# Patient Record
Sex: FEMALE | Race: BLACK OR AFRICAN AMERICAN | NOT HISPANIC OR LATINO | Employment: OTHER | URBAN - METROPOLITAN AREA
[De-identification: names, ages, dates, MRNs, and addresses within clinical notes are randomized per-mention and may not be internally consistent; named-entity substitution may affect disease eponyms.]

---

## 2018-10-23 ENCOUNTER — OFFICE VISIT (OUTPATIENT)
Dept: GASTROENTEROLOGY | Facility: CLINIC | Age: 68
End: 2018-10-23
Payer: COMMERCIAL

## 2018-10-23 ENCOUNTER — APPOINTMENT (OUTPATIENT)
Dept: LAB | Facility: CLINIC | Age: 68
End: 2018-10-23
Payer: COMMERCIAL

## 2018-10-23 VITALS
BODY MASS INDEX: 20.38 KG/M2 | HEIGHT: 63 IN | DIASTOLIC BLOOD PRESSURE: 70 MMHG | WEIGHT: 115 LBS | SYSTOLIC BLOOD PRESSURE: 130 MMHG | HEART RATE: 76 BPM

## 2018-10-23 DIAGNOSIS — Z93.2 ILEOSTOMY IN PLACE (HCC): ICD-10-CM

## 2018-10-23 DIAGNOSIS — Z93.2 ILEOSTOMY IN PLACE (HCC): Primary | ICD-10-CM

## 2018-10-23 DIAGNOSIS — K50.012 CROHN'S DISEASE OF SMALL INTESTINE WITH INTESTINAL OBSTRUCTION (HCC): ICD-10-CM

## 2018-10-23 LAB
ALBUMIN SERPL BCP-MCNC: 3.4 G/DL (ref 3.5–5)
ALP SERPL-CCNC: 78 U/L (ref 46–116)
ALT SERPL W P-5'-P-CCNC: 18 U/L (ref 12–78)
ANION GAP SERPL CALCULATED.3IONS-SCNC: 5 MMOL/L (ref 4–13)
AST SERPL W P-5'-P-CCNC: 12 U/L (ref 5–45)
BASOPHILS # BLD AUTO: 0.02 THOUSANDS/ΜL (ref 0–0.1)
BASOPHILS NFR BLD AUTO: 0 % (ref 0–1)
BILIRUB SERPL-MCNC: 0.4 MG/DL (ref 0.2–1)
BUN SERPL-MCNC: 16 MG/DL (ref 5–25)
CALCIUM SERPL-MCNC: 9.4 MG/DL (ref 8.3–10.1)
CHLORIDE SERPL-SCNC: 106 MMOL/L (ref 100–108)
CO2 SERPL-SCNC: 26 MMOL/L (ref 21–32)
CREAT SERPL-MCNC: 0.76 MG/DL (ref 0.6–1.3)
CRP SERPL QL: <3 MG/L
EOSINOPHIL # BLD AUTO: 0.04 THOUSAND/ΜL (ref 0–0.61)
EOSINOPHIL NFR BLD AUTO: 1 % (ref 0–6)
ERYTHROCYTE [DISTWIDTH] IN BLOOD BY AUTOMATED COUNT: 15.9 % (ref 11.6–15.1)
GFR SERPL CREATININE-BSD FRML MDRD: 95 ML/MIN/1.73SQ M
GLUCOSE SERPL-MCNC: 86 MG/DL (ref 65–140)
HCT VFR BLD AUTO: 37.5 % (ref 34.8–46.1)
HGB BLD-MCNC: 11.5 G/DL (ref 11.5–15.4)
IMM GRANULOCYTES # BLD AUTO: 0 THOUSAND/UL (ref 0–0.2)
IMM GRANULOCYTES NFR BLD AUTO: 0 % (ref 0–2)
LYMPHOCYTES # BLD AUTO: 2.26 THOUSANDS/ΜL (ref 0.6–4.47)
LYMPHOCYTES NFR BLD AUTO: 49 % (ref 14–44)
MCH RBC QN AUTO: 26.1 PG (ref 26.8–34.3)
MCHC RBC AUTO-ENTMCNC: 30.7 G/DL (ref 31.4–37.4)
MCV RBC AUTO: 85 FL (ref 82–98)
MONOCYTES # BLD AUTO: 0.56 THOUSAND/ΜL (ref 0.17–1.22)
MONOCYTES NFR BLD AUTO: 12 % (ref 4–12)
NEUTROPHILS # BLD AUTO: 1.76 THOUSANDS/ΜL (ref 1.85–7.62)
NEUTS SEG NFR BLD AUTO: 38 % (ref 43–75)
NRBC BLD AUTO-RTO: 0 /100 WBCS
PLATELET # BLD AUTO: 376 THOUSANDS/UL (ref 149–390)
PMV BLD AUTO: 11.6 FL (ref 8.9–12.7)
POTASSIUM SERPL-SCNC: 3.6 MMOL/L (ref 3.5–5.3)
PROT SERPL-MCNC: 8.1 G/DL (ref 6.4–8.2)
RBC # BLD AUTO: 4.4 MILLION/UL (ref 3.81–5.12)
SODIUM SERPL-SCNC: 137 MMOL/L (ref 136–145)
WBC # BLD AUTO: 4.64 THOUSAND/UL (ref 4.31–10.16)

## 2018-10-23 PROCEDURE — 36415 COLL VENOUS BLD VENIPUNCTURE: CPT

## 2018-10-23 PROCEDURE — 85025 COMPLETE CBC W/AUTO DIFF WBC: CPT

## 2018-10-23 PROCEDURE — 86140 C-REACTIVE PROTEIN: CPT

## 2018-10-23 PROCEDURE — 80053 COMPREHEN METABOLIC PANEL: CPT

## 2018-10-23 PROCEDURE — 99204 OFFICE O/P NEW MOD 45 MIN: CPT | Performed by: INTERNAL MEDICINE

## 2018-10-23 RX ORDER — ALBUTEROL SULFATE 90 UG/1
AEROSOL, METERED RESPIRATORY (INHALATION)
COMMUNITY
Start: 2018-06-21

## 2018-10-23 RX ORDER — DORZOLAMIDE HYDROCHLORIDE AND TIMOLOL MALEATE 20; 5 MG/ML; MG/ML
SOLUTION/ DROPS OPHTHALMIC
COMMUNITY
Start: 2018-01-24

## 2018-10-23 RX ORDER — PREDNISONE 10 MG/1
TABLET ORAL
COMMUNITY
Start: 2018-08-28 | End: 2020-08-12 | Stop reason: ALTCHOICE

## 2018-10-23 RX ORDER — FAMOTIDINE 20 MG
TABLET ORAL
COMMUNITY

## 2018-10-23 RX ORDER — FOLIC ACID 20 MG
CAPSULE ORAL
COMMUNITY

## 2018-10-23 RX ORDER — MONTELUKAST SODIUM 10 MG/1
TABLET ORAL
COMMUNITY
Start: 2018-08-28 | End: 2021-10-13 | Stop reason: SDDI

## 2018-10-23 RX ORDER — MOMETASONE FUROATE 50 UG/1
2 SPRAY, METERED NASAL
COMMUNITY
Start: 2018-08-28

## 2018-10-23 RX ORDER — BRIMONIDINE TARTRATE 2 MG/ML
SOLUTION/ DROPS OPHTHALMIC
COMMUNITY
Start: 2018-10-10 | End: 2020-08-12 | Stop reason: DRUGHIGH

## 2018-10-23 RX ORDER — LEVOTHYROXINE SODIUM 0.03 MG/1
TABLET ORAL
COMMUNITY
Start: 2018-08-28

## 2018-10-23 RX ORDER — POTASSIUM CHLORIDE 20 MEQ/1
20 TABLET, EXTENDED RELEASE ORAL
COMMUNITY
Start: 2015-07-15

## 2018-10-23 RX ORDER — ASCORBIC ACID 125 MG
TABLET,CHEWABLE ORAL
COMMUNITY

## 2018-10-23 RX ORDER — LOPERAMIDE HYDROCHLORIDE 2 MG/1
TABLET ORAL
COMMUNITY
Start: 2018-10-08 | End: 2018-11-07

## 2018-10-23 RX ORDER — PANTOPRAZOLE SODIUM 40 MG/1
40 TABLET, DELAYED RELEASE ORAL
COMMUNITY
Start: 2015-07-15 | End: 2020-08-12 | Stop reason: SDUPTHER

## 2018-10-23 NOTE — LETTER
October 23, 2018     Chuck Christianson MD  [facility] [address] [facility]  [address]    Patient: Ayanna Hui   YOB: 1950   Date of Visit: 10/23/2018       Dear Dr Macrina Siddiqui: Thank you for referring Ayanna Hui to me for evaluation  Below are my notes for this consultation  If you have questions, please do not hesitate to call me  I look forward to following your patient along with you  Sincerely,        Frederick Castro MD        CC: No Recipients  Trey December, Ruby  10/23/2018  3:21 PM  Sign at close encounter  [facility]    Dear Dr Macrina Siddiqui,    I had the pleasure of seeing your patient Ayanna Hui in the office today and I thank you for this kind referral        Chief Complaint: post-ileostomy      HPI:  Ayanna Hui is a 74 y o  female with history of COPD and diabetes who presents today post-ileostomy  She had j-pouch from prior surgery for ulcerative colitis  She recently had CT scan at [facility] which reportedly showed an abnormality  She reports that she had ileostomy performed in September 2018  She reports some mucus discharge from the rectum, likely a rectal pouch  She had colonoscopy and what sounds like possible perforation during the procedure  She was reportedly told that she has Crohn's disease because there was inflammation, it is unclear if biopsies were taken to provide objective evidence  She states that she has only seen the surgeon and has never seen a gastroenterologist  She has never taken budesonide or Lialda or any Rowasa preparations, or Remicaide or any intravenous preparations  She only has taken sulfasalazine which caused skin burns when she would go into the sun so this was discontinued  Currently, she has symptoms of mild intermittent abdominal pain, diarrhea for which she successfully takes loperamide, weight gain  She denies any weight loss   She has recently taken Prednisone but this was for an upper respiratory infection, not for bowel inflammation  The patient is a somewhat confusing historian and is not 100% clear why she is here, thinks it may have something to do with being treated for Crohn's disease  On further questioning, at the present time, it is apparent that her symptomatology is very minimal                  Review of Systems:   Constitutional: No fever or chills, feels well, no tiredness, positive mild recent weight gain   HENT: No complaints of earache, no hearing loss, no nosebleeds, no nasal discharge, no sore throat, no hoarseness  Eyes: No complaints of eye pain, no red eyes, no discharge from eyes, no itchy eyes  Cardiovascular: No complaints of slow heart rate, no fast heart rate, no chest pain, no palpitations, no leg claudication, no lower extremity edema  Respiratory: No complaints of shortness of breath, no wheezing, no cough, no SOB on exertion, no orthopnea  Gastrointestinal: As noted in HPI  Genitourinary: No complaints of dysuria, no incontinence, no hesitancy, no nocturia  Musculoskeletal: No complaints of arthralgia, no myalgias, no joint swelling or stiffness, no limb pain or swelling  Neurological: No complaints of headache, no confusion, no convulsions, no numbness or tingling, no dizziness or fainting, no limb weakness, no difficulty walking  Skin: No complaints of skin rash or skin lesions, no itching, no skin wound, no dry skin  Hematological/Lymphatic: No complaints of swollen glands, does not bleed easy  Allergic/Immunologic: No immunocompromised state  Endocrine:  No complaints of polyuria, no polydipsia  Psychiatric/Behavioral: is not suicidal, no sleep disturbances, no anxiety or depression, no change in personality, no emotional problems  Historical Information   No past medical history on file  No past surgical history on file    Social History   History   Alcohol Use No     History   Drug Use No     History   Smoking Status    Never Smoker   Smokeless Tobacco    Never Used     No family history on file  Current Medications: has a current medication list which includes the following prescription(s): albuterol, brimonidine tartrate, b-12, dorzolamide-timolol, folic acid, levothyroxine, loperamide, magnesium, mometasone, mometasone-formoterol, montelukast, ocutabs-lutein, pantoprazole, potassium chloride, prednisone, and zinc        Vital Signs: /70   Pulse 76   Ht 5' 2 5" (1 588 m)   Wt 52 2 kg (115 lb)   BMI 20 70 kg/m²      Physical Exam:   Constitutional  General Appearance: No acute distress, well appearing and well nourished  Head  Normocephalic  Eyes  Conjunctivae and lids: No swelling, erythema, or discharge  Pupils and irises: Equal, round and reactive to light  Ears, Nose, Mouth, and Throat  External inspection of ears and nose: Normal  Nasal mucosa, septum and turbinates: Normal without edema or erythema/   Oropharynx: Normal with no erythema, edema, exudate or lesions  Neck  Normal range of motion  Neck supple  Cardiovascular  Auscultation of the heart: Normal rate and rhythm, normal S1 and S2 without murmurs  Examination of the extremities for edema and/or varicosities: Normal  Pulmonary/Chest  Respiratory effort: No increased work of breathing or signs of respiratory distress  Auscultation of lungs: Clear to auscultation, equal breath sounds bilaterally, no wheezes, rales, no rhonchi  Abdomen  Abdomen: Non-tender, no masses  Ileostomy in place with no issues  Abdominal scarring from prior surgeries  Liver and spleen: No hepatomegaly or splenomegaly  Musculoskeletal  Gait and station: normal   Digits and Nails: normal without clubbing or cyanosis  Inspection/palpation of joints, bones, and muscles: Normal  Neurological  No nystagmus or asterixis  Skin  Skin and subcutaneous tissue: Normal without rashes or lesions  Lymphatic  Palpation of the lymph nodes in neck: No lymphadenopathy     Psychiatric  Orientation to person, place and time: Normal   Mood and affect: Normal          Labs:   No results found for: ALBUMIN, ALT, AST, BUN, CALCIUM, CL, CHOL, CO2, CREATININE, GFRAA, GFRNONAA, GLU, HDL, HCT, HGB, HGBA1C, LDL, MG, PHOS, PLT, K, PSA, NA, TRIG, WBC      X-Rays & Procedures:   No orders to display         ______________________________________________________________________      Assessment & Plan:        1  Ileostomy in place  Patient has an extensive GI history which was not available to me until today  She currently has ileostomy in place and was reportedly told she has Crohn's disease after recent colonoscopy, unclear if biopsies were taken to provide objective evidence of this  Will order CRP, CBC, and CMP at this time to evaluate  2  Crohn's Disease  Patient was reportedly told she has Crohn's disease after recent colonoscopy, uncertain how diagnosis was made  Will order CRP, CBC, and CMP as above  I will be happy to inform you of her results and any further recommendations  I would like to thank you for allowing me to participate in her care  I will be seeing her again in a couple of weeks to try to put all of this together for her  With warmest regards,    Brinda Sheikh MD, [facility]         Attestation:   By signing my name below, Renea Evelin, attest that this documentation has been prepared under the direction and in the presence of Brinda Sheikh MD  Electronically Signed: Ruby Sanchez  10/23/18     I, Brinda Sheikh, personally performed the services described in this documentation  All medical record entries made by the liliaiblily were at my direction and in my presence  I have reviewed the chart and discharge instructions and agree that the record reflects my personal performance and is accurate and complete    Brinda Sheikh MD  10/23/18

## 2018-10-23 NOTE — PROGRESS NOTES
[facility]    Dear Dr Eloise Santamaria,    I had the pleasure of seeing your patient Golden Vicente in the office today and I thank you for this kind referral        Chief Complaint: post-ileostomy      HPI:  Golden Vicente is a 74 y o  female with history of COPD and diabetes who presents today post-ileostomy  She had j-pouch from prior surgery for ulcerative colitis  She recently had CT scan at [facility] which reportedly showed an abnormality  She reports that she had ileostomy performed in September 2018  She reports some mucus discharge from the rectum, likely a rectal pouch  She had colonoscopy and what sounds like possible perforation during the procedure  She was reportedly told that she has Crohn's disease because there was inflammation, it is unclear if biopsies were taken to provide objective evidence  She states that she has only seen the surgeon and has never seen a gastroenterologist  She has never taken budesonide or Lialda or any Rowasa preparations, or Remicaide or any intravenous preparations  She only has taken sulfasalazine which caused skin burns when she would go into the sun so this was discontinued  Currently, she has symptoms of mild intermittent abdominal pain, diarrhea for which she successfully takes loperamide, weight gain  She denies any weight loss  She has recently taken Prednisone but this was for an upper respiratory infection, not for bowel inflammation  The patient is a somewhat confusing historian and is not 100% clear why she is here, thinks it may have something to do with being treated for Crohn's disease  On further questioning, at the present time, it is apparent that her symptomatology is very minimal                  Review of Systems:   Constitutional: No fever or chills, feels well, no tiredness, positive mild recent weight gain   HENT: No complaints of earache, no hearing loss, no nosebleeds, no nasal discharge, no sore throat, no hoarseness      Eyes: No complaints of eye pain, no red eyes, no discharge from eyes, no itchy eyes  Cardiovascular: No complaints of slow heart rate, no fast heart rate, no chest pain, no palpitations, no leg claudication, no lower extremity edema  Respiratory: No complaints of shortness of breath, no wheezing, no cough, no SOB on exertion, no orthopnea  Gastrointestinal: As noted in HPI  Genitourinary: No complaints of dysuria, no incontinence, no hesitancy, no nocturia  Musculoskeletal: No complaints of arthralgia, no myalgias, no joint swelling or stiffness, no limb pain or swelling  Neurological: No complaints of headache, no confusion, no convulsions, no numbness or tingling, no dizziness or fainting, no limb weakness, no difficulty walking  Skin: No complaints of skin rash or skin lesions, no itching, no skin wound, no dry skin  Hematological/Lymphatic: No complaints of swollen glands, does not bleed easy  Allergic/Immunologic: No immunocompromised state  Endocrine:  No complaints of polyuria, no polydipsia  Psychiatric/Behavioral: is not suicidal, no sleep disturbances, no anxiety or depression, no change in personality, no emotional problems  Historical Information   No past medical history on file  No past surgical history on file  Social History   History   Alcohol Use No     History   Drug Use No     History   Smoking Status    Never Smoker   Smokeless Tobacco    Never Used     No family history on file        Current Medications: has a current medication list which includes the following prescription(s): albuterol, brimonidine tartrate, b-12, dorzolamide-timolol, folic acid, levothyroxine, loperamide, magnesium, mometasone, mometasone-formoterol, montelukast, ocutabs-lutein, pantoprazole, potassium chloride, prednisone, and zinc        Vital Signs: /70   Pulse 76   Ht 5' 2 5" (1 588 m)   Wt 52 2 kg (115 lb)   BMI 20 70 kg/m²     Physical Exam:   Constitutional  General Appearance: No acute distress, well appearing and well nourished  Head  Normocephalic  Eyes  Conjunctivae and lids: No swelling, erythema, or discharge  Pupils and irises: Equal, round and reactive to light  Ears, Nose, Mouth, and Throat  External inspection of ears and nose: Normal  Nasal mucosa, septum and turbinates: Normal without edema or erythema/   Oropharynx: Normal with no erythema, edema, exudate or lesions  Neck  Normal range of motion  Neck supple  Cardiovascular  Auscultation of the heart: Normal rate and rhythm, normal S1 and S2 without murmurs  Examination of the extremities for edema and/or varicosities: Normal  Pulmonary/Chest  Respiratory effort: No increased work of breathing or signs of respiratory distress  Auscultation of lungs: Clear to auscultation, equal breath sounds bilaterally, no wheezes, rales, no rhonchi  Abdomen  Abdomen: Non-tender, no masses  Ileostomy in place with no issues  Abdominal scarring from prior surgeries  Liver and spleen: No hepatomegaly or splenomegaly  Musculoskeletal  Gait and station: normal   Digits and Nails: normal without clubbing or cyanosis  Inspection/palpation of joints, bones, and muscles: Normal  Neurological  No nystagmus or asterixis  Skin  Skin and subcutaneous tissue: Normal without rashes or lesions  Lymphatic  Palpation of the lymph nodes in neck: No lymphadenopathy  Psychiatric  Orientation to person, place and time: Normal   Mood and affect: Normal          Labs:   No results found for: ALBUMIN, ALT, AST, BUN, CALCIUM, CL, CHOL, CO2, CREATININE, GFRAA, GFRNONAA, GLU, HDL, HCT, HGB, HGBA1C, LDL, MG, PHOS, PLT, K, PSA, NA, TRIG, WBC      X-Rays & Procedures:   No orders to display         ______________________________________________________________________      Assessment & Plan:        1  Ileostomy in place  Patient has an extensive GI history which was not available to me until today   She currently has ileostomy in place and was reportedly told she has Crohn's disease after recent colonoscopy, unclear if biopsies were taken to provide objective evidence of this  Will order CRP, CBC, and CMP at this time to evaluate  2  Crohn's Disease  Patient was reportedly told she has Crohn's disease after recent colonoscopy, uncertain how diagnosis was made  Will order CRP, CBC, and CMP as above  I will be happy to inform you of her results and any further recommendations  I would like to thank you for allowing me to participate in her care  I will be seeing her again in a couple of weeks to try to put all of this together for her  With warmest regards,    Cece Muhammad MD, [facility]         Attestation:   By signing my name below, Stacie Chansadia, attest that this documentation has been prepared under the direction and in the presence of Cece Muhammad MD  Electronically Signed: Ruby Tyler  10/23/18     I, Cece Muhammad, personally performed the services described in this documentation  All medical record entries made by the scribe were at my direction and in my presence  I have reviewed the chart and discharge instructions and agree that the record reflects my personal performance and is accurate and complete    Cece Muhammad MD  10/23/18

## 2018-12-04 ENCOUNTER — OFFICE VISIT (OUTPATIENT)
Dept: GASTROENTEROLOGY | Facility: CLINIC | Age: 68
End: 2018-12-04
Payer: COMMERCIAL

## 2018-12-04 VITALS
HEART RATE: 67 BPM | SYSTOLIC BLOOD PRESSURE: 130 MMHG | BODY MASS INDEX: 21.44 KG/M2 | DIASTOLIC BLOOD PRESSURE: 70 MMHG | WEIGHT: 121 LBS | RESPIRATION RATE: 18 BRPM | HEIGHT: 63 IN

## 2018-12-04 DIAGNOSIS — K50.018 CROHN'S DISEASE OF SMALL INTESTINE WITH OTHER COMPLICATION (HCC): Primary | ICD-10-CM

## 2018-12-04 DIAGNOSIS — R10.30 LOWER ABDOMINAL PAIN: ICD-10-CM

## 2018-12-04 DIAGNOSIS — K62.5 RECTAL BLEEDING: ICD-10-CM

## 2018-12-04 PROCEDURE — 99214 OFFICE O/P EST MOD 30 MIN: CPT | Performed by: INTERNAL MEDICINE

## 2018-12-04 RX ORDER — MESALAMINE 1000 MG/1
1000 SUPPOSITORY RECTAL
Qty: 30 SUPPOSITORY | Refills: 0 | Status: SHIPPED | OUTPATIENT
Start: 2018-12-04 | End: 2020-08-17

## 2018-12-04 NOTE — PROGRESS NOTES
[facility]      Chief Complaint:   Crohn's disease    HPI:  Mallory Hernandez is a 79 y o   female  with history of COPD and diabetes who presents  for consideration of treatment for Crohn's disease post-ileostomy  She had j-pouch from prior surgery for ulcerative colitis  She recently had CT scan at [facility] which reportedly showed an abnormality  She reports that she had ileostomy performed in September 2018  She reports some mucus discharge from the rectum which is now also accompanied by a small amount of red blood, likely a rectal pouch  She had colonoscopy and what sounds like possible perforation during the procedure  She was reportedly told that she has Crohn's disease because there was inflammation, it is unclear if biopsies were taken to provide objective evidence  She states that she has only seen the surgeon and has never seen a gastroenterologist  She has never taken budesonide or Lialda or any Rowasa preparations, or Remicaide or any intravenous preparations  She only has taken sulfasalazine which caused skin burns when she would go into the sun so this was discontinued  Currently, she has symptoms of mild intermittent abdominal pain, diarrhea for which she successfully takes loperamide, and has weight gain  She denies any weight loss  She has recently taken Prednisone but this was for an upper respiratory infection, not for bowel inflammation  The patient is a somewhat confusing historian and is not 100% clear why she is here, thinks it may have something to do with being treated for Crohn's disease  On further questioning, at the present time, it is apparent that her symptomatology is very minimal     We went over her blood results today  Hemoglobin and hematocrit are normal, liver functions are normal with the exception of a slightly low albumin of 3 4  Platelet count is 692    C reactive protein is normal   Patient has no other symptomatology at this time except for intermittent brief cramping lower abdominal pain, and the aforementioned slightly bloody discharge from the rectal stump  Review of Systems:   Constitutional: No fever or chills, feels well, no tiredness, no recent weight gain or weight loss  HENT: No complaints of earache, no hearing loss, no nosebleeds, no nasal discharge, no sore throat, no hoarseness  Eyes: No complaints of eye pain, no red eyes, no discharge from eyes, no itchy eyes  Cardiovascular: No complaints of slow heart rate, no fast heart rate, no chest pain, no palpitations, no leg claudication, no lower extremity edema  Respiratory: No complaints of shortness of breath, no wheezing, no cough, no SOB on exertion, no orthopnea  Gastrointestinal: As noted in HPI  Genitourinary: No complaints of dysuria, no incontinence, no hesitancy, no nocturia  Musculoskeletal: No complaints of arthralgia, no myalgias, no joint swelling or stiffness, no limb pain or swelling  Neurological: No complaints of headache, no confusion, no convulsions, no numbness or tingling, no dizziness or fainting, no limb weakness, no difficulty walking  Skin: No complaints of skin rash or skin lesions, no itching, no skin wound, no dry skin  Hematological/Lymphatic: No complaints of swollen glands, does not bleed easy  Allergic/Immunologic: No immunocompromised state  Endocrine:  No complaints of polyuria, no polydipsia  Psychiatric/Behavioral: is not suicidal, no sleep disturbances, no anxiety or depression, no change in personality, no emotional problems         Historical Information   Past Medical History:   Diagnosis Date    Asthma     COPD (chronic obstructive pulmonary disease) [place] [address] )     Crohn's disease ([place] [address] )     Diabetes mellitus ([place] [address] )     Ocular hypertension      Past Surgical History:   Procedure Laterality Date    CATARACT EXTRACTION      COLON SURGERY      Partial colectomy, partial removal of the rectum    EXPLORATORY LAPAROTOMY      ILEOSTOMY       Social History   History   Alcohol Use No     History   Drug Use No     History   Smoking Status    Never Smoker   Smokeless Tobacco    Never Used     Family History   Problem Relation Age of Onset    Diabetes Mother     Hypertension Mother          Current Medications: has a current medication list which includes the following prescription(s): albuterol, brimonidine tartrate, calcium carb-cholecalciferol, b-12, dorzolamide-timolol, folic acid, levothyroxine, magnesium, mometasone, mometasone-formoterol, montelukast, ocutabs-lutein, pantoprazole, potassium chloride, prednisone, zinc, and mesalamine  Vital Signs: /70   Pulse 67   Resp 18   Ht 5' 2 5" (1 588 m)   Wt 54 9 kg (121 lb)   BMI 21 78 kg/m²       Physical Exam:   Constitutional  General Appearance: No acute distress, well appearing and well nourished  Head  Normocephalic  Eyes  Conjunctivae and lids: No swelling, erythema, or discharge  Pupils and irises: Equal, round and reactive to light  Ears, Nose, Mouth, and Throat  External inspection of ears and nose: Normal  Nasal mucosa, septum and turbinates: Normal without edema or erythema/   Oropharynx: Normal with no erythema, edema, exudate or lesions  Neck  Normal range of motion  Neck supple  Cardiovascular  Auscultation of the heart: Normal rate and rhythm, normal S1 and S2 without murmurs  Examination of the extremities for edema and/or varicosities: Normal  Pulmonary/Chest  Respiratory effort: No increased work of breathing or signs of respiratory distress  Auscultation of lungs: Clear to auscultation, equal breath sounds bilaterally, no wheezes, rales, no rhonchi  Abdomen  Bowel sounds normoactive  Ostomy in place  Nontender  Liver and spleen: No hepatomegaly or splenomegaly  Musculoskeletal  Gait and station: normal   Digits and Nails: normal without clubbing or cyanosis    Inspection/palpation of joints, bones, and muscles: Normal  Neurological  No nystagmus or asterixis  Skin  Skin and subcutaneous tissue: Normal without rashes or lesions  Lymphatic  Palpation of the lymph nodes in neck: No lymphadenopathy  Psychiatric  Orientation to person, place and time: Normal   Mood and affect: Normal          Labs:  Lab Results   Component Value Date    ALT 18 10/23/2018    AST 12 10/23/2018    BUN 16 10/23/2018    CALCIUM 9 4 10/23/2018     10/23/2018    CO2 26 10/23/2018    CREATININE 0 76 10/23/2018    HCT 37 5 10/23/2018    HGB 11 5 10/23/2018     10/23/2018    K 3 6 10/23/2018    WBC 4 64 10/23/2018         X-Rays & Procedures:   FL UGI/sm bowel    (Results Pending)           ______________________________________________________________________      Assessment & Plan:     Diagnoses and all orders for this visit:    Crohn's disease of small intestine with other complication (HCC)  -     mesalamine ([place]) 1,000 mg suppository; Insert 1 suppository (1,000 mg total) into the rectum daily at bedtime  -     FL UGI/sm bowel; Future    Lower abdominal pain    Rectal bleeding  -     mesalamine ([place]) 1,000 mg suppository; Insert 1 suppository (1,000 mg total) into the rectum daily at bedtime    At this point the activity index for her disease is very low  I am not sure with the endpoint for beginning treatment would be  She is virtually asymptomatic except for rare lower abdominal cramping which does not appear to have any relation to food  It is very self-limited  She appears to be having appropriate loose green stool in her ostomy bag  We did discuss exclusion colitis as the possible cause of her rectal symptoms  Overall, she appears to be doing quite well at this point off systemic treatment  We certainly did discuss the suppositories as a treatment for her rectal symptomatology  She is in agreement with the treatment plan so far  Patient will follow up with me in the office in about 3 months    She will call me for the results of her studies and with her progress

## 2019-01-04 ENCOUNTER — HOSPITAL ENCOUNTER (OUTPATIENT)
Dept: RADIOLOGY | Facility: HOSPITAL | Age: 69
Discharge: HOME | End: 2019-01-04
Attending: INTERNAL MEDICINE
Payer: COMMERCIAL

## 2019-01-04 DIAGNOSIS — K50.018 CROHN'S DISEASE OF SMALL INTESTINE WITH OTHER COMPLICATION (HCC): ICD-10-CM

## 2019-01-04 PROCEDURE — 74245 HB X-RAY UPPER GI&SMALL INTEST: CPT

## 2019-01-28 ENCOUNTER — TELEPHONE (OUTPATIENT)
Dept: GASTROENTEROLOGY | Facility: CLINIC | Age: 69
End: 2019-01-28

## 2019-03-05 ENCOUNTER — OFFICE VISIT (OUTPATIENT)
Dept: GASTROENTEROLOGY | Facility: CLINIC | Age: 69
End: 2019-03-05
Payer: COMMERCIAL

## 2019-03-05 VITALS
BODY MASS INDEX: 22.32 KG/M2 | HEART RATE: 86 BPM | HEIGHT: 63 IN | WEIGHT: 126 LBS | DIASTOLIC BLOOD PRESSURE: 74 MMHG | SYSTOLIC BLOOD PRESSURE: 132 MMHG | RESPIRATION RATE: 18 BRPM

## 2019-03-05 DIAGNOSIS — Z98.890 H/O ILEOSTOMY: ICD-10-CM

## 2019-03-05 DIAGNOSIS — K50.018 CROHN'S DISEASE OF SMALL INTESTINE WITH OTHER COMPLICATION (HCC): Primary | ICD-10-CM

## 2019-03-05 PROCEDURE — 99213 OFFICE O/P EST LOW 20 MIN: CPT | Performed by: INTERNAL MEDICINE

## 2019-03-05 NOTE — PROGRESS NOTES
[facility]            Chief Complaint:  Crohn's disease      HPI:  Edwige Lara is a 79 y o  female who presents with  A history of Crohn's disease with multiple surgeries as was well outlined in prior visit  Currently she is desirous to have her ileostomy reversed  I had a conversation with her surgeon who is going to do a pouch endoscopy with dilatation  Currently the patient is gaining weight  No abdominal pain  No bleeding  Otherwise she feels quite well except for some dry skin and cough  She has no symptomatology referable to the GI tract at this time  Inflammatory markers last time we measured them were essentially negative  Her appetite is good  She has no other complaints at the present time  No extraintestinal manifestations  Some irritation at the ileostomy site      Review of Systems:   Constitutional: No fever or chills, feels well, no tiredness, no recent weight gain or weight loss  HENT: No complaints of earache, no hearing loss, no nosebleeds, no nasal discharge, no sore throat, no hoarseness  Eyes: No complaints of eye pain, no red eyes, no discharge from eyes, no itchy eyes  Cardiovascular: No complaints of slow heart rate, no fast heart rate, no chest pain, no palpitations, no leg claudication, no lower extremity edema  Respiratory: No complaints of shortness of breath, no wheezing, no cough, no SOB on exertion, no orthopnea  Gastrointestinal: As noted in HPI  Genitourinary: No complaints of dysuria, no incontinence, no hesitancy, no nocturia  Musculoskeletal: No complaints of arthralgia, no myalgias, no joint swelling or stiffness, no limb pain or swelling  Neurological: No complaints of headache, no confusion, no convulsions, no numbness or tingling, no dizziness or fainting, no limb weakness, no difficulty walking      Skin: No complaints of skin rash or skin lesions, no itching, no skin wound,  Positive dry skin   Hematological/Lymphatic: No complaints of swollen glands, does not bleed easy  Allergic/Immunologic: No immunocompromised state  Endocrine:  No complaints of polyuria, no polydipsia  Psychiatric/Behavioral: is not suicidal, no sleep disturbances, no anxiety or depression, no change in personality, no emotional problems  Historical Information   Past Medical History:   Diagnosis Date    Asthma     COPD (chronic obstructive pulmonary disease) ([place] [address] )     Crohn's disease ([place] [address] )     Diabetes mellitus ([place] [address] )     Ocular hypertension      Past Surgical History:   Procedure Laterality Date    CATARACT EXTRACTION      COLON SURGERY      Partial colectomy, partial removal of the rectum    EXPLORATORY LAPAROTOMY      ILEOSTOMY       Social History   Social History     Substance and Sexual Activity   Alcohol Use No     Social History     Substance and Sexual Activity   Drug Use No     Social History     Tobacco Use   Smoking Status Never Smoker   Smokeless Tobacco Never Used     Family History   Problem Relation Age of Onset    Diabetes Mother     Hypertension Mother          Current Medications: has a current medication list which includes the following prescription(s): albuterol, brimonidine tartrate, calcium carb-cholecalciferol, b-12, dorzolamide-timolol, folic acid, levothyroxine, magnesium, mesalamine, mometasone, mometasone-formoterol, montelukast, ocutabs-lutein, pantoprazole, potassium chloride, prednisone, and zinc        Vital Signs: /74   Pulse 86   Resp 18   Ht 5' 2 5" (1 588 m)   Wt 57 2 kg (126 lb)   BMI 22 68 kg/m²     Physical Exam:   Constitutional  General Appearance: No acute distress, well appearing and well nourished  Head  Normocephalic  Eyes  Conjunctivae and lids: No swelling, erythema, or discharge  Pupils and irises: Equal, round and reactive to light     Ears, Nose, Mouth, and Throat  External inspection of ears and nose: Normal  Nasal mucosa, septum and turbinates: Normal without edema or erythema/   Oropharynx: Normal with no erythema, edema, exudate or lesions  Neck  Normal range of motion  Neck supple  Cardiovascular  Auscultation of the heart: Normal rate and rhythm, normal S1 and S2 without murmurs  Examination of the extremities for edema and/or varicosities: Normal  Pulmonary/Chest  Respiratory effort: No increased work of breathing or signs of respiratory distress  Auscultation of lungs: Clear to auscultation, equal breath sounds bilaterally, no wheezes, rales, no rhonchi  Abdomen  Abdomen: Non-tender, no masses  Ileostomy in place  Bowel sounds normal active  Liver and spleen: No hepatomegaly or splenomegaly  Musculoskeletal  Gait and station: normal   Digits and Nails: normal without clubbing or cyanosis  Inspection/palpation of joints, bones, and muscles: Normal  Neurological  No nystagmus or asterixis  Skin  Skin and subcutaneous tissue: Normal without rashes or lesions  Positive dry skin  Lymphatic  Palpation of the lymph nodes in neck: No lymphadenopathy  Psychiatric  Orientation to person, place and time: Normal   Mood and affect: Normal          Labs:   Lab Results   Component Value Date    ALT 18 10/23/2018    AST 12 10/23/2018    BUN 16 10/23/2018    CALCIUM 9 4 10/23/2018     10/23/2018    CO2 26 10/23/2018    CREATININE 0 76 10/23/2018    HCT 37 5 10/23/2018    HGB 11 5 10/23/2018     10/23/2018    K 3 6 10/23/2018    WBC 4 64 10/23/2018         X-Rays & Procedures:   No orders to display         ______________________________________________________________________      Assessment & Plan:      Diagnoses and all orders for this visit:    Crohn's disease of small intestine with other complication [facility])    H/O ileostomy       patient is going to be seeing her surgeon next week for procedure  I did discuss this with the surgeon  She is going to try to have him reverse the ileostomy if at all possible    I told her this is clearly a surgeons decision and not mine  She fully understands this  She will get me a copy of the results  She will continue her current medical management  She will see me on a p r n  Basis

## 2020-08-12 ENCOUNTER — APPOINTMENT (OUTPATIENT)
Dept: LAB | Facility: CLINIC | Age: 70
End: 2020-08-12
Payer: COMMERCIAL

## 2020-08-12 ENCOUNTER — OFFICE VISIT (OUTPATIENT)
Dept: GASTROENTEROLOGY | Facility: CLINIC | Age: 70
End: 2020-08-12
Payer: COMMERCIAL

## 2020-08-12 VITALS
SYSTOLIC BLOOD PRESSURE: 128 MMHG | HEIGHT: 62 IN | WEIGHT: 115 LBS | DIASTOLIC BLOOD PRESSURE: 70 MMHG | HEART RATE: 68 BPM | BODY MASS INDEX: 21.16 KG/M2 | TEMPERATURE: 97.2 F

## 2020-08-12 DIAGNOSIS — K21.9 GASTROESOPHAGEAL REFLUX DISEASE, ESOPHAGITIS PRESENCE NOT SPECIFIED: ICD-10-CM

## 2020-08-12 DIAGNOSIS — K50.819 CROHN'S DISEASE OF SMALL AND LARGE INTESTINES WITH COMPLICATION (HCC): Primary | ICD-10-CM

## 2020-08-12 DIAGNOSIS — K92.1 BLOOD IN STOOL: ICD-10-CM

## 2020-08-12 DIAGNOSIS — D50.9 IRON DEFICIENCY ANEMIA, UNSPECIFIED IRON DEFICIENCY ANEMIA TYPE: ICD-10-CM

## 2020-08-12 DIAGNOSIS — K50.819 CROHN'S DISEASE OF SMALL AND LARGE INTESTINES WITH COMPLICATION (HCC): ICD-10-CM

## 2020-08-12 LAB
ALBUMIN SERPL BCP-MCNC: 3.1 G/DL (ref 3.5–5)
ALP SERPL-CCNC: 80 U/L (ref 46–116)
ALT SERPL W P-5'-P-CCNC: 17 U/L (ref 12–78)
ANION GAP SERPL CALCULATED.3IONS-SCNC: 6 MMOL/L (ref 4–13)
AST SERPL W P-5'-P-CCNC: 15 U/L (ref 5–45)
BASOPHILS # BLD AUTO: 0.06 THOUSANDS/ΜL (ref 0–0.1)
BASOPHILS NFR BLD AUTO: 1 % (ref 0–1)
BILIRUB SERPL-MCNC: 0.43 MG/DL (ref 0.2–1)
BUN SERPL-MCNC: 13 MG/DL (ref 5–25)
CALCIUM SERPL-MCNC: 9.2 MG/DL (ref 8.3–10.1)
CHLORIDE SERPL-SCNC: 104 MMOL/L (ref 100–108)
CO2 SERPL-SCNC: 30 MMOL/L (ref 21–32)
CREAT SERPL-MCNC: 0.82 MG/DL (ref 0.6–1.3)
CRP SERPL QL: 9.3 MG/L
EOSINOPHIL # BLD AUTO: 0.08 THOUSAND/ΜL (ref 0–0.61)
EOSINOPHIL NFR BLD AUTO: 1 % (ref 0–6)
ERYTHROCYTE [DISTWIDTH] IN BLOOD BY AUTOMATED COUNT: 21.8 % (ref 11.6–15.1)
ERYTHROCYTE [SEDIMENTATION RATE] IN BLOOD: >130 MM/HOUR (ref 0–29)
GFR SERPL CREATININE-BSD FRML MDRD: 86 ML/MIN/1.73SQ M
GLUCOSE P FAST SERPL-MCNC: 100 MG/DL (ref 65–99)
HBV CORE AB SER QL: NORMAL
HBV SURFACE AB SER-ACNC: 22.62 MIU/ML
HBV SURFACE AG SER QL: NORMAL
HCT VFR BLD AUTO: 35.4 % (ref 34.8–46.1)
HGB BLD-MCNC: 9.3 G/DL (ref 11.5–15.4)
IMM GRANULOCYTES # BLD AUTO: 0.03 THOUSAND/UL (ref 0–0.2)
IMM GRANULOCYTES NFR BLD AUTO: 0 % (ref 0–2)
LYMPHOCYTES # BLD AUTO: 1.99 THOUSANDS/ΜL (ref 0.6–4.47)
LYMPHOCYTES NFR BLD AUTO: 28 % (ref 14–44)
MCH RBC QN AUTO: 19.1 PG (ref 26.8–34.3)
MCHC RBC AUTO-ENTMCNC: 26.3 G/DL (ref 31.4–37.4)
MCV RBC AUTO: 73 FL (ref 82–98)
MONOCYTES # BLD AUTO: 0.69 THOUSAND/ΜL (ref 0.17–1.22)
MONOCYTES NFR BLD AUTO: 10 % (ref 4–12)
NEUTROPHILS # BLD AUTO: 4.25 THOUSANDS/ΜL (ref 1.85–7.62)
NEUTS SEG NFR BLD AUTO: 60 % (ref 43–75)
NRBC BLD AUTO-RTO: 0 /100 WBCS
PLATELET # BLD AUTO: 524 THOUSANDS/UL (ref 149–390)
PMV BLD AUTO: 10.7 FL (ref 8.9–12.7)
POTASSIUM SERPL-SCNC: 3.8 MMOL/L (ref 3.5–5.3)
PROT SERPL-MCNC: 8.7 G/DL (ref 6.4–8.2)
RBC # BLD AUTO: 4.88 MILLION/UL (ref 3.81–5.12)
SODIUM SERPL-SCNC: 140 MMOL/L (ref 136–145)
WBC # BLD AUTO: 7.1 THOUSAND/UL (ref 4.31–10.16)

## 2020-08-12 PROCEDURE — 87340 HEPATITIS B SURFACE AG IA: CPT

## 2020-08-12 PROCEDURE — 36415 COLL VENOUS BLD VENIPUNCTURE: CPT

## 2020-08-12 PROCEDURE — 86706 HEP B SURFACE ANTIBODY: CPT

## 2020-08-12 PROCEDURE — 85025 COMPLETE CBC W/AUTO DIFF WBC: CPT

## 2020-08-12 PROCEDURE — 86480 TB TEST CELL IMMUN MEASURE: CPT

## 2020-08-12 PROCEDURE — 99214 OFFICE O/P EST MOD 30 MIN: CPT | Performed by: PHYSICIAN ASSISTANT

## 2020-08-12 PROCEDURE — 86140 C-REACTIVE PROTEIN: CPT

## 2020-08-12 PROCEDURE — 82306 VITAMIN D 25 HYDROXY: CPT

## 2020-08-12 PROCEDURE — 85652 RBC SED RATE AUTOMATED: CPT

## 2020-08-12 PROCEDURE — 80053 COMPREHEN METABOLIC PANEL: CPT

## 2020-08-12 PROCEDURE — 86704 HEP B CORE ANTIBODY TOTAL: CPT

## 2020-08-12 RX ORDER — SIMETHICONE 80 MG
80 TABLET,CHEWABLE ORAL
COMMUNITY

## 2020-08-12 RX ORDER — LORATADINE 10 MG/1
TABLET ORAL
COMMUNITY
End: 2020-08-17

## 2020-08-12 RX ORDER — GLIMEPIRIDE 2 MG/1
TABLET ORAL
COMMUNITY

## 2020-08-12 RX ORDER — PREDNISONE 1 MG/1
TABLET ORAL
Qty: 252 TABLET | Refills: 0 | Status: SHIPPED | OUTPATIENT
Start: 2020-08-12 | End: 2020-08-24 | Stop reason: SDUPTHER

## 2020-08-12 RX ORDER — PANTOPRAZOLE SODIUM 40 MG/1
40 TABLET, DELAYED RELEASE ORAL DAILY
Qty: 30 TABLET | Refills: 0 | Status: SHIPPED | OUTPATIENT
Start: 2020-08-12 | End: 2020-09-28 | Stop reason: ALTCHOICE

## 2020-08-12 RX ORDER — LISINOPRIL 5 MG/1
5 TABLET ORAL DAILY
COMMUNITY
End: 2021-10-13 | Stop reason: SDDI

## 2020-08-12 RX ORDER — B-COMPLEX WITH VITAMIN C
TABLET ORAL
COMMUNITY
End: 2020-08-17

## 2020-08-12 NOTE — PROGRESS NOTES
[facility] - Outpatient Follow-up Note  Arcenio Hdz 81 y o  female MRN: [id number]  Encounter: [id number]          ASSESSMENT AND PLAN:      1  Crohn's disease  2  Iron deficiency anemia  3  Blood in stool    Patient has a long, complicated history  She originally was diagnosed with UC and underwent a total proctocolectomy with IPAA in 2007  In 2019, she was admitted with a SBO and found to have a stricture at the pouch anal anastomosis and adhesions and underwent dilation of the stricture complicated by microperforation, ex lap, RONA, and creation of loop ileostomy (intraoperatively she was noted to have large linear ulcerations without the pouch and afferent limb of the small bowel)  She then had an ileostomy reversal in 10/2019  [place] her [place] [facility] [place] stricture with aphthous ulcerations in the afferent limp, a hemorrhagic appearance and inflammation of the ileum consistent with SB crohn's disease and she was referred here to start biological treatment  She was also given a Prednisone taper at that time in the spring which temporarily helped her symptoms  She is just following up now    She reports struggling with abdominal pain, diarrhea, rectal bleeding, and reports a weight loss of 15 lbs  She also reports intermittent black stools previously as well which subsided  She also has a significant iron deficiency anemia with her latest HGB of 9 and iron level of 15  She needs biological therapy for her Crohn's disease  Will check CBC, CMP, ESR, CRP, and prebiological testing (Hep B serology, TB quantiferon, and CXR)  Will check CT enterography  Will also plan for EGD given her recent black stools  Will check stool for c diff  We discussed biological agents such as Remicade, Humira, and Entyvio in detail and discussed potential SEs    Will begin a Prednisone taper (40mg po daily with decrease q 5 weeks - she was instructed to monitor her glucose levels closely on the medication) and see her back if 2 weeks after the above testing to start biological treatment/obtain approval through her insurance company  ______________________________________________________________________    SUBJECTIVE:  Patient is a 81year old female who has a long, complicated history  She originally was diagnosed with UC and underwent a total proctocolectomy with IPAA in 2007  In 2019, she was admitted with a SBO and found to have a stricture at the pouch anal anastomosis and adhesions and underwent dilation of the stricture complicated by microperforation, ex lap, RONA, and creation of loop ileostomy (intraoperatively she was noted to have large linear ulcerations without the pouch and afferent limb of the small bowel)  She then had an ileostomy reversal in 10/2019  Recent colonoscopy in March by her colorectal surgeon Dr Alberto Connelly in [facility] showed an anal stricture with aphthous ulcerations in the afferent limp, a hemorrhagic appearance and inflammation of the ileum consistent with SB crohn's disease and she was referred here to start biological treatment  She was also given a Prednisone taper at that time in the spring which temporarily helped her symptoms significantly  She is just following up now to our office  She reports struggling with abdominal pain, diarrhea, rectal bleeding, and reports a weight loss of 15 lbs  She also reports prior intermittent black stools as well  She reports she is having about 5 episodes of diarrhea a day  She also has a significant iron deficiency anemia with her latest HGB of 9 and iron level of 15  She reports she has been started on iron supplementation by her primary care physician but reports that the black stools occurred before the iron supplementation  She reports she has never been on a biological agent like Remicade, Humira, or Entyvio  She denies any history of c diff  She is a diabetic on oral medication  She denies any history of CHF  REVIEW OF SYSTEMS IS OTHERWISE NEGATIVE        Historical Information   Past Medical History:   Diagnosis Date    Asthma     [facility]ch[facility] obstructive [facility] disease) ([facility] )     Crohn's disease ([facility] )     Diabetes mellitus ([facility] )     Ocular hypertension      Past Surgical History:   Procedure Laterality Date    CATARACT EXTRACTION      COLON SURGERY      Partial colectomy, partial removal of the rectum    EXPLORATORY LAPAROTOMY      ILEOSTOMY       Social History   Social History     Substance and Sexual Activity   Alcohol Use No     Social History     Substance and Sexual Activity   Drug Use No     Social History     Tobacco Use   Smoking Status Never Smoker   Smokeless Tobacco Never Used     Family History   Problem Relation Age of Onset    Diabetes Mother     Hypertension Mother        Meds/Allergies       Current Outpatient Medications:     Acetaminophen 325 MG CAPS    albuterol (PROVENTIL HFA,VENTOLIN HFA) 90 mcg/act inhaler    Calcium Carb-Cholecalciferol (CALCIUM 600 + D PO)    Calcium Carbonate-Vitamin D 600-200 MG-UNIT TABS    Calcium-Magnesium-Zinc 333-133-5 MG TABS    Cyanocobalamin (B-12) 5000 MCG CAPS    dorzolamide-timolol (COSOPT) 22 3-6 8 MG/ML ophthalmic solution    Folic Acid 20 MG CAPS    levothyroxine 25 mcg tablet    lisinopril (ZESTRIL) 5 mg tablet    loratadine (CLARITIN) 10 mg tablet    MAGNESIUM PO    mesalamine (CANASA) 1,000 mg suppository    metFORMIN (GLUCOPHAGE) 500 mg tablet    mometasone (NASONEX) 50 mcg/act nasal spray    mometasone-formoterol (DULERA) 200-5 MCG/ACT inhaler    montelukast (SINGULAIR) 10 mg tablet    Multiple Vitamins-Minerals (OCUTABS-LUTEIN) TABS    pantoprazole (PROTONIX) 40 mg tablet    potassium chloride (KLOR-CON M20) 20 mEq tablet    simethicone (MYLICON) 80 mg chewable tablet    timolol (TIMOPTIC) 0 25 % ophthalmic solution    Zinc 50 MG CAPS   predniSONE 5 mg tablet    Allergies   Allergen Reactions    Brimonidine      Red eyes    Tramadol Nausea Only           Objective     Blood pressure 128/70, pulse 68, temperature (!) 97 2 °F (36 2 °C), height 5' 2" (1 575 m), weight 52 2 kg (115 lb)  Body mass index is 21 03 kg/m²  PHYSICAL EXAM:      General Appearance:   Alert, cooperative, no distress   HEENT:   Normocephalic, atraumatic, anicteric    Neck:  Supple, symmetrical, trachea midline   Lungs:   Clear to auscultation bilaterally; no rales, rhonchi or wheezing; respirations unlabored    Heart[de-identified]   Regular rate and rhythm; no murmur, rub, or gallop  Abdomen:   Soft, non-tender, non-distended; normal bowel sounds; no masses, no organomegaly    Genitalia:   Deferred    Rectal:   Deferred    Extremities:  No cyanosis, clubbing or edema    Pulses:  2+ and symmetric    Skin:  No jaundice, rashes, or lesions    Lymph nodes:  No palpable cervical lymphadenopathy        Lab Results:   No visits with results within 1 Day(s) from this visit     Latest known visit with results is:   Appointment on 10/23/2018   Component Date Value    WBC 10/23/2018 4 64     RBC 10/23/2018 4 40     Hemoglobin 10/23/2018 11 5     Hematocrit 10/23/2018 37 5     MCV 10/23/2018 85     MCH 10/23/2018 26 1*    MCHC 10/23/2018 30 7*    RDW 10/23/2018 15 9*    MPV 10/23/2018 11 6     Platelets 67/48/9955 376     nRBC 10/23/2018 0     Neutrophils Relative 10/23/2018 38*    Immat GRANS % 10/23/2018 0     Lymphocytes Relative 10/23/2018 49*    Monocytes Relative 10/23/2018 12     Eosinophils Relative 10/23/2018 1     Basophils Relative 10/23/2018 0     Neutrophils Absolute 10/23/2018 1 76*    Immature Grans Absolute 10/23/2018 0 00     Lymphocytes Absolute 10/23/2018 2 26     Monocytes Absolute 10/23/2018 0 56     Eosinophils Absolute 10/23/2018 0 04     Basophils Absolute 10/23/2018 0 02     CRP 10/23/2018 <3 0     Sodium 10/23/2018 137     Potassium 10/23/2018 3 6     Chloride 10/23/2018 106     CO2 10/23/2018 26     ANION GAP 10/23/2018 5     BUN 10/23/2018 16     Creatinine 10/23/2018 0 76     Glucose 10/23/2018 86     Calcium 10/23/2018 9 4     AST 10/23/2018 12     ALT 10/23/2018 18     Alkaline Phosphatase 10/23/2018 78     Total Protein 10/23/2018 8 1     Albumin 10/23/2018 3 4*    Total Bilirubin 10/23/2018 0 40     eGFR 10/23/2018 95          Radiology Results:   No results found

## 2020-08-12 NOTE — H&P (VIEW-ONLY)
[facility] - Outpatient Follow-up Note  Dannie Gonzalez 81 y o  female MRN: [id number]  Encounter: [id number]          ASSESSMENT AND PLAN:      1  Crohn's disease  2  Iron deficiency anemia  3  Blood in stool    Patient has a long, complicated history  She originally was diagnosed with UC and underwent a total proctocolectomy with IPAA in 2007  In 2019, she was admitted with a SBO and found to have a stricture at the pouch anal anastomosis and adhesions and underwent dilation of the stricture complicated by microperforation, ex lap, RONA, and creation of loop ileostomy (intraoperatively she was noted to have large linear ulcerations without the pouch and afferent limb of the small bowel)  She then had an ileostomy reversal in 10/2019  Recent colonoscopy in March by her colorectal surgeon Dr Devante Wallis in [facility] [place] showed an anal stricture with aphthous ulcerations in the afferent limp, a hemorrhagic appearance and inflammation of the ileum consistent with SB crohn's disease and she was referred here to start biological treatment  She was also given a Prednisone taper at that time in the spring which temporarily helped her symptoms  She is just following up now    She reports struggling with abdominal pain, diarrhea, rectal bleeding, and reports a weight loss of 15 lbs  She also reports intermittent black stools previously as well which subsided  She also has a significant iron deficiency anemia with her latest HGB of 9 and iron level of 15  She needs biological therapy for her Crohn's disease  Will check CBC, CMP, ESR, CRP, and prebiological testing (Hep B serology, TB quantiferon, and CXR)  Will check CT enterography  Will also plan for EGD given her recent black stools  Will check stool for c diff  We discussed biological agents such as Remicade, Humira, and Entyvio in detail and discussed potential SEs    Will begin a Prednisone taper (40mg po daily with decrease q 5 weeks - she was instructed to monitor her glucose levels closely on the medication) and see her back if 2 weeks after the above testing to start biological treatment/obtain approval through her insurance company  ______________________________________________________________________    SUBJECTIVE:  Patient is a 81year old female who has a long, complicated history  She originally was diagnosed with UC and underwent a total proctocolectomy with IPAA in 2007  In 2019, she was admitted with a SBO and found to have a stricture at the pouch anal anastomosis and adhesions and underwent dilation of the stricture complicated by microperforation, ex lap, RONA, and creation of loop ileostomy (intraoperatively she was noted to have large linear ulcerations without the pouch and afferent limb of the small bowel)  She then had an ileostomy reversal in 10/2019  Recent colonoscopy in March by her colorectal surgeon Dr Valentino Ester in [facility] showed an anal stricture with aphthous ulcerations in the afferent limp, a hemorrhagic appearance and inflammation of the ileum consistent with SB crohn's disease and she was referred here to start biological treatment  She was also given a Prednisone taper at that time in the spring which temporarily helped her symptoms significantly  She is just following up now to our office  She reports struggling with abdominal pain, diarrhea, rectal bleeding, and reports a weight loss of 15 lbs  She also reports prior intermittent black stools as well  She reports she is having about 5 episodes of diarrhea a day  She also has a significant iron deficiency anemia with her latest HGB of 9 and iron level of 15  She reports she has been started on iron supplementation by her primary care physician but reports that the black stools occurred before the iron supplementation  She reports she has never been on a biological agent like Remicade, Humira, or Entyvio  She denies any history of c diff  She is a diabetic on oral medication  She denies any history of CHF  REVIEW OF SYSTEMS IS OTHERWISE NEGATIVE        Historical Information   Past Medical History:   Diagnosis Date    Asthma     [facility] )     Cro[facility]'s disease ([facility] )     Diabetes mellitus ([facility] )     Ocular hypertension      Past Surgical History:   Procedure Laterality Date    CATARACT EXTRACTION      COLON SURGERY      Partial colectomy, partial removal of the rectum    EXPLORATORY LAPAROTOMY      ILEOSTOMY       Social History   Social History     Substance and Sexual Activity   Alcohol Use No     Social History     Substance and Sexual Activity   Drug Use No     Social History     Tobacco Use   Smoking Status Never Smoker   Smokeless Tobacco Never Used     Family History   Problem Relation Age of Onset    Diabetes Mother     Hypertension Mother        Meds/Allergies       Current Outpatient Medications:     Acetaminophen 325 MG CAPS    albuterol (PROVENTIL HFA,VENTOLIN HFA) 90 mcg/act inhaler    Calcium Carb-Cholecalciferol (CALCIUM 600 + D PO)    Calcium Carbonate-Vitamin D 600-200 MG-UNIT TABS    Calcium-Magnesium-Zinc 333-133-5 MG TABS    Cyanocobalamin (B-12) 5000 MCG CAPS    dorzolamide-timolol (COSOPT) 22 3-6 8 MG/ML ophthalmic solution    Folic Acid 20 MG CAPS    levothyroxine 25 mcg tablet    lisinopril (ZESTRIL) 5 mg tablet    loratadine (CLARITIN) 10 mg tablet    MAGNESIUM PO    mesalamine (CANASA) 1,000 mg suppository    metFORMIN (GLUCOPHAGE) 500 mg tablet    mometasone (NASONEX) 50 mcg/act nasal spray    mometasone-formoterol (DULERA) 200-5 MCG/ACT inhaler    montelukast (SINGULAIR) 10 mg tablet    Multiple Vitamins-Minerals (OCUTABS-LUTEIN) TABS    pantoprazole (PROTONIX) 40 mg tablet    potassium chloride (KLOR-CON M20) 20 mEq tablet    simethicone (MYLICON) 80 mg chewable tablet    timolol (TIMOPTIC) 0 25 % ophthalmic solution    Zinc 50 MG CAPS   predniSONE 5 mg tablet    Allergies   Allergen Reactions    Brimonidine      Red eyes    Tramadol Nausea Only           Objective     Blood pressure 128/70, pulse 68, temperature (!) 97 2 °F (36 2 °C), height 5' 2" (1 575 m), weight 52 2 kg (115 lb)  Body mass index is 21 03 kg/m²  PHYSICAL EXAM:      General Appearance:   Alert, cooperative, no distress   HEENT:   Normocephalic, atraumatic, anicteric    Neck:  Supple, symmetrical, trachea midline   Lungs:   Clear to auscultation bilaterally; no rales, rhonchi or wheezing; respirations unlabored    Heart[de-identified]   Regular rate and rhythm; no murmur, rub, or gallop  Abdomen:   Soft, non-tender, non-distended; normal bowel sounds; no masses, no organomegaly    Genitalia:   Deferred    Rectal:   Deferred    Extremities:  No cyanosis, clubbing or edema    Pulses:  2+ and symmetric    Skin:  No jaundice, rashes, or lesions    Lymph nodes:  No palpable cervical lymphadenopathy        Lab Results:   No visits with results within 1 Day(s) from this visit     Latest known visit with results is:   Appointment on 10/23/2018   Component Date Value    WBC 10/23/2018 4 64     RBC 10/23/2018 4 40     Hemoglobin 10/23/2018 11 5     Hematocrit 10/23/2018 37 5     MCV 10/23/2018 85     MCH 10/23/2018 26 1*    MCHC 10/23/2018 30 7*    RDW 10/23/2018 15 9*    MPV 10/23/2018 11 6     Platelets 19/80/1865 376     nRBC 10/23/2018 0     Neutrophils Relative 10/23/2018 38*    Immat GRANS % 10/23/2018 0     Lymphocytes Relative 10/23/2018 49*    Monocytes Relative 10/23/2018 12     Eosinophils Relative 10/23/2018 1     Basophils Relative 10/23/2018 0     Neutrophils Absolute 10/23/2018 1 76*    Immature Grans Absolute 10/23/2018 0 00     Lymphocytes Absolute 10/23/2018 2 26     Monocytes Absolute 10/23/2018 0 56     Eosinophils Absolute 10/23/2018 0 04     Basophils Absolute 10/23/2018 0 02     CRP 10/23/2018 <3 0     Sodium 10/23/2018 137     Potassium 10/23/2018 3 6     Chloride 10/23/2018 106     CO2 10/23/2018 26     ANION GAP 10/23/2018 5     BUN 10/23/2018 16     Creatinine 10/23/2018 0 76     Glucose 10/23/2018 86     Calcium 10/23/2018 9 4     AST 10/23/2018 12     ALT 10/23/2018 18     Alkaline Phosphatase 10/23/2018 78     Total Protein 10/23/2018 8 1     Albumin 10/23/2018 3 4*    Total Bilirubin 10/23/2018 0 40     eGFR 10/23/2018 95          Radiology Results:   No results found

## 2020-08-13 LAB — 25(OH)D3 SERPL-MCNC: 25.6 NG/ML (ref 30–100)

## 2020-08-14 LAB
GAMMA INTERFERON BACKGROUND BLD IA-ACNC: 0.04 IU/ML
M TB IFN-G BLD-IMP: NEGATIVE
M TB IFN-G CD4+ BCKGRND COR BLD-ACNC: 0.01 IU/ML
M TB IFN-G CD4+ BCKGRND COR BLD-ACNC: 0.04 IU/ML
MITOGEN IGNF BCKGRD COR BLD-ACNC: 4.61 IU/ML

## 2020-08-17 ENCOUNTER — HOSPITAL ENCOUNTER (OUTPATIENT)
Dept: GASTROENTEROLOGY | Facility: HOSPITAL | Age: 70
Setting detail: OUTPATIENT SURGERY
Discharge: HOME | End: 2020-08-17
Attending: INTERNAL MEDICINE | Admitting: INTERNAL MEDICINE
Payer: COMMERCIAL

## 2020-08-17 ENCOUNTER — ANESTHESIA EVENT (OUTPATIENT)
Dept: GASTROENTEROLOGY | Facility: HOSPITAL | Age: 70
End: 2020-08-17

## 2020-08-17 ENCOUNTER — ANESTHESIA (OUTPATIENT)
Dept: GASTROENTEROLOGY | Facility: HOSPITAL | Age: 70
End: 2020-08-17

## 2020-08-17 ENCOUNTER — TELEPHONE (OUTPATIENT)
Dept: GASTROENTEROLOGY | Facility: CLINIC | Age: 70
End: 2020-08-17

## 2020-08-17 VITALS
RESPIRATION RATE: 13 BRPM | WEIGHT: 114.8 LBS | DIASTOLIC BLOOD PRESSURE: 70 MMHG | BODY MASS INDEX: 20.34 KG/M2 | SYSTOLIC BLOOD PRESSURE: 125 MMHG | HEIGHT: 63 IN | OXYGEN SATURATION: 100 % | HEART RATE: 66 BPM | TEMPERATURE: 98.2 F

## 2020-08-17 DIAGNOSIS — K50.018 CROHN'S DISEASE OF SMALL INTESTINE WITH OTHER COMPLICATION (HCC): ICD-10-CM

## 2020-08-17 DIAGNOSIS — K50.819 CROHN'S DISEASE OF SMALL AND LARGE INTESTINES WITH COMPLICATION (HCC): ICD-10-CM

## 2020-08-17 LAB — GLUCOSE SERPL-MCNC: 92 MG/DL (ref 65–140)

## 2020-08-17 PROCEDURE — 82948 REAGENT STRIP/BLOOD GLUCOSE: CPT

## 2020-08-17 PROCEDURE — 43235 EGD DIAGNOSTIC BRUSH WASH: CPT | Performed by: INTERNAL MEDICINE

## 2020-08-17 RX ORDER — PROPOFOL 10 MG/ML
INJECTION, EMULSION INTRAVENOUS AS NEEDED
Status: DISCONTINUED | OUTPATIENT
Start: 2020-08-17 | End: 2020-08-17

## 2020-08-17 RX ORDER — LIDOCAINE HYDROCHLORIDE 10 MG/ML
INJECTION, SOLUTION EPIDURAL; INFILTRATION; INTRACAUDAL; PERINEURAL AS NEEDED
Status: DISCONTINUED | OUTPATIENT
Start: 2020-08-17 | End: 2020-08-17

## 2020-08-17 RX ORDER — LIDOCAINE HYDROCHLORIDE 10 MG/ML
0.5 INJECTION, SOLUTION EPIDURAL; INFILTRATION; INTRACAUDAL; PERINEURAL ONCE AS NEEDED
Status: DISCONTINUED | OUTPATIENT
Start: 2020-08-17 | End: 2020-08-21 | Stop reason: HOSPADM

## 2020-08-17 RX ORDER — SODIUM CHLORIDE, SODIUM LACTATE, POTASSIUM CHLORIDE, CALCIUM CHLORIDE 600; 310; 30; 20 MG/100ML; MG/100ML; MG/100ML; MG/100ML
125 INJECTION, SOLUTION INTRAVENOUS CONTINUOUS
Status: DISCONTINUED | OUTPATIENT
Start: 2020-08-17 | End: 2020-08-17

## 2020-08-17 RX ADMIN — SODIUM CHLORIDE, SODIUM LACTATE, POTASSIUM CHLORIDE, AND CALCIUM CHLORIDE 125 ML/HR: .6; .31; .03; .02 INJECTION, SOLUTION INTRAVENOUS at 09:59

## 2020-08-17 RX ADMIN — PROPOFOL 100 MG: 10 INJECTION, EMULSION INTRAVENOUS at 10:19

## 2020-08-17 RX ADMIN — LIDOCAINE HYDROCHLORIDE 50 MG: 10 INJECTION, SOLUTION EPIDURAL; INFILTRATION; INTRACAUDAL; PERINEURAL at 10:19

## 2020-08-17 RX ADMIN — PROPOFOL 30 MG: 10 INJECTION, EMULSION INTRAVENOUS at 10:20

## 2020-08-17 NOTE — INTERVAL H&P NOTE
H&P reviewed  After examining the patient I find no changes in the patients condition since the H&P had been written      Vitals:    08/17/20 0943   BP: 138/77   Pulse: 76   Resp: 22   Temp: 97 9 °F (36 6 °C)   SpO2: 100%

## 2020-08-17 NOTE — ANESTHESIA POSTPROCEDURE EVALUATION
Post-Op Assessment Note    CV Status:  Stable  Pain Score: 0    Pain management: adequate     Mental Status:  Sleepy   Hydration Status:  Stable   PONV Controlled:  None   Airway Patency:  Patent and adequate      Post Op Vitals Reviewed: Yes      Staff: CRNA         No complications documented      BP   104/56   Temp   98 1   HR 79   Resp 18   SpO2 99

## 2020-08-17 NOTE — TELEPHONE ENCOUNTER
----- Message from Rose Lund PA-C sent at 8/17/2020  3:42 PM EDT -----  Can you call about this patient's CT enterography  It looks like she is scheduled 9/16 for it- she has severe Crohn's and I do not want her waiting that long for the CT (I would like it done within the next week)

## 2020-08-17 NOTE — DISCHARGE INSTRUCTIONS
Upper Endoscopy   WHAT YOU NEED TO KNOW:   An upper endoscopy is also called an upper gastrointestinal (GI) endoscopy, or an esophagogastroduodenoscopy (EGD)  You may feel bloated, gassy, or have some abdominal discomfort after your procedure  Your throat may be sore for 24 to 36 hours  You may burp or pass gas from air that is still inside your body  DISCHARGE INSTRUCTIONS:   Call 911 for any of the following:   · You have sudden chest pain or trouble breathing  Seek care immediately if:   · You feel dizzy or faint  · You have trouble swallowing  · Your bowel movements are very dark or black  · Your abdomen is hard and firm and you have severe pain  · You vomit blood  Contact your healthcare provider if:   · You feel full or bloated and cannot burp or pass gas  · You have not had a bowel movement for 3 days after your procedure  · You have neck pain  · You have a fever or chills  · You have nausea or are vomiting  · You have a rash or hives  · You have questions or concerns about your endoscopy  Relieve a sore throat:  Suck on throat lozenges or crushed ice  Gargle with a small amount of warm salt water  Mix 1 teaspoon of salt and 1 cup of warm water to make salt water  Relieve gas and discomfort from bloating:  Lie on your right side with a heating pad on your abdomen  Take short walks to help pass gas  Eat small meals until bloating is relieved  Rest after your procedure: You have been given medicine to relax you  Do not  drive or make important decisions until the day after your procedure  Return to your normal activity as directed  You can usually return to work the day after your procedure  Follow up with your healthcare provider as directed:  Write down your questions so you remember to ask them during your visits     © 2017 [address] is for End User's use only and may not be sold, redistributed or otherwise used for commercial purposes  All illustrations and images included in CareNotes® are the copyrighted property of A D A M , Inc  or Deshawn Carey  The above information is an  only  It is not intended as medical advice for individual conditions or treatments  Talk to your doctor, nurse or pharmacist before following any medical regimen to see if it is safe and effective for you

## 2020-08-17 NOTE — ANESTHESIA PREPROCEDURE EVALUATION
Procedure:  EGD    Relevant Problems   PULMONARY   (+) Asthma   (+) COPD (chronic obstructive pulmonary disease) (HCC)      Other   (+) Diabetes mellitus (HCC)   (+) Ocular hypertension        Physical Exam    Airway      TM Distance: >3 FB  Neck ROM: full     Dental   No notable dental hx     Cardiovascular  Cardiovascular exam normal    Pulmonary  Pulmonary exam normal     Other Findings        Anesthesia Plan  ASA Score- 2     Anesthesia Type- IV sedation with anesthesia with ASA Monitors  Additional Monitors:   Airway Plan:           Plan Factors-    Chart reviewed  Patient summary reviewed  Induction- intravenous  Postoperative Plan-     Informed Consent- Anesthetic plan and risks discussed with patient  I personally reviewed this patient with the CRNA  Discussed and agreed on the Anesthesia Plan with the ALLEN Olmos

## 2020-08-24 ENCOUNTER — OFFICE VISIT (OUTPATIENT)
Dept: GASTROENTEROLOGY | Facility: CLINIC | Age: 70
End: 2020-08-24
Payer: COMMERCIAL

## 2020-08-24 ENCOUNTER — TELEPHONE (OUTPATIENT)
Dept: GASTROENTEROLOGY | Facility: CLINIC | Age: 70
End: 2020-08-24

## 2020-08-24 VITALS
SYSTOLIC BLOOD PRESSURE: 120 MMHG | DIASTOLIC BLOOD PRESSURE: 60 MMHG | HEART RATE: 62 BPM | HEIGHT: 60 IN | TEMPERATURE: 97.2 F | WEIGHT: 114 LBS | BODY MASS INDEX: 22.38 KG/M2

## 2020-08-24 DIAGNOSIS — D50.0 IRON DEFICIENCY ANEMIA DUE TO CHRONIC BLOOD LOSS: Primary | ICD-10-CM

## 2020-08-24 DIAGNOSIS — K50.819 CROHN'S DISEASE OF SMALL AND LARGE INTESTINES WITH COMPLICATION (HCC): ICD-10-CM

## 2020-08-24 PROCEDURE — 99213 OFFICE O/P EST LOW 20 MIN: CPT | Performed by: PHYSICIAN ASSISTANT

## 2020-08-24 RX ORDER — PREDNISONE 1 MG/1
TABLET ORAL
Qty: 252 TABLET | Refills: 0 | Status: SHIPPED | OUTPATIENT
Start: 2020-08-24 | End: 2020-11-04

## 2020-08-24 RX ORDER — PREDNISONE 1 MG/1
TABLET ORAL
Qty: 252 TABLET | Refills: 0 | Status: SHIPPED | OUTPATIENT
Start: 2020-08-24 | End: 2020-08-24 | Stop reason: SDUPTHER

## 2020-08-24 NOTE — TELEPHONE ENCOUNTER
Called ptmarissa to give our office a call back  Need to know which pharmacy she wants to use for her prednisone rx

## 2020-08-24 NOTE — PROGRESS NOTES
[facility] - Outpatient Follow-up Note  Bessy Skinner 81 y o  female MRN: [id number]  Encounter: [id number]          ASSESSMENT AND PLAN:      1  Crohn's disease  2  Iron deficiency anemia    Patient has a long, complicated history  She originally was diagnosed with UC and underwent a total proctocolectomy with IPAA in 2007  In 2019, she was admitted with a SBO and found to have a stricture at the pouch anal anastomosis and adhesions and underwent dilation of the stricture complicated by microperforation, ex lap, RONA, and creation of loop ileostomy (intraoperatively she was noted to have large linear ulcerations without the pouch and afferent limb of the small bowel)  She then had an ileostomy reversal in 10/2019       Recent colonoscopy in March by her colorectal surgeon Dr Desiree Cowan in [facility] showed an anal stricture with aphthous ulcerations in the afferent limp, a hemorrhagic appearance and inflammation of the ileum consistent with SB crohn's disease and she was referred here to start biological treatment  She was also given a Prednisone taper at that time in the spring which temporarily helped her symptoms      She just followed up here first 2 weeks ago  She reports struggling with abdominal pain, diarrhea, rectal bleeding, and reports a weight loss of 15 lbs  EGD was normal (she had reported prior black stools so this test was performed)  HGB 9 3  ESR dramatically elevated at >130 and CRP 9 3  Prebiological testing for Hep B and TB were negative  She needs biological therapy for her Crohn's disease  We discussed Entyvio infusions 300mg IV on week 0, 2, 6, and then every 8 weeks for treatment of her crohn's disease  (Given her advanced age and comorbidities we will use Entyvio (as this medication has a better safety profile than other biological agents such as Remicade and Humira))  Will submit to insurance for approval   Follow up CT Enterography    She is scheduled for this test the 31st   Follow up stool for c diff and fecal calprotectin (she did not have these done yet)  She also did not begin the Prednisone taper yet (40mg po daily with decrease q 5 weeks - she was instructed to monitor her glucose levels closely on the medication)  Resent to pharmacy  Follow up in 4 weeks  ______________________________________________________________________    SUBJECTIVE:  Patient is a 81year old female who has a long, complicated history  She originally was diagnosed with UC and underwent a total proctocolectomy with IPAA in 2007  In 2019, she was admitted with a SBO and found to have a stricture at the pouch anal anastomosis and adhesions and underwent dilation of the stricture complicated by microperforation, ex lap, RONA, and creation of loop ileostomy (intraoperatively she was noted to have large linear ulcerations without the pouch and afferent limb of the small bowel)  She then had an ileostomy reversal in 10/2019  Recent colonoscopy in March by her colorectal surgeon Dr Kingston Zapata in [facility] showed an anal stricture with aphthous ulcerations in the afferent limp, a hemorrhagic appearance and inflammation of the ileum consistent with SB crohn's disease and she was referred here to start biological treatment  She was also given a Prednisone taper at that time in the spring which temporarily helped her symptoms significantly  She just followed up now to our office 2 weeks ago (she was recommended to follow up here in the spring)  She reports struggling with abdominal pain, diarrhea, rectal bleeding, and reports a weight loss of 15 lbs  She also reported prior intermittent black stools as well and EGD was performed which was normal   She reports she is having about 5 episodes of diarrhea a day  She also has a significant iron deficiency anemia with her latest HGB of 9 and iron level of 15    She reports she has been started on iron supplementation by her primary care physician  ESR is dramatically elevated at >130 and CRP is 9 3  She reports she has never been on a biological agent like Remicade, Humira, or Entyvio  She denies any history of c diff  She is a diabetic on oral medication  She denies any history of CHF but no prior Echo report with an EF is available for review  She did not have the stool tests yet and did not start the Prednisone yet  She is scheduled for the CT enterography the 31st (she was told to hold Metformin for 48 hours after)  She had negative prebiological testing for Hep B and TB  REVIEW OF SYSTEMS IS OTHERWISE NEGATIVE        Historical Information   Past Medical History:   Diagnosis Date    Asthma     [facility]     [facility]'s [facility] ([facility] )     Diabetes mellitus ([facility] )     Hypertension     Ocular hypertension      Past Surgical History:   Procedure Laterality Date    CATARACT EXTRACTION      COLON SURGERY      Partial colectomy, partial removal of the rectum    EXPLORATORY LAPAROTOMY      ILEOSTOMY       Social History   Social History     Substance and Sexual Activity   Alcohol Use No     Social History     Substance and Sexual Activity   Drug Use No     Social History     Tobacco Use   Smoking Status Never Smoker   Smokeless Tobacco Never Used     Family History   Problem Relation Age of Onset    Diabetes Mother     Hypertension Mother        Meds/Allergies       Current Outpatient Medications:     albuterol (PROVENTIL HFA,VENTOLIN HFA) 90 mcg/act inhaler    Calcium Carb-Cholecalciferol (CALCIUM 600 + D PO)    Cyanocobalamin (B-12) 5000 MCG CAPS    dorzolamide-timolol (COSOPT) 22 3-6 8 MG/ML ophthalmic solution    Folic Acid 20 MG CAPS    levothyroxine 25 mcg tablet    lisinopril (ZESTRIL) 5 mg tablet    MAGNESIUM PO    metFORMIN (GLUCOPHAGE) 500 mg tablet    mometasone (NASONEX) 50 mcg/act nasal spray    mometasone-formoterol (DULERA) 200-5 MCG/ACT inhaler   montelukast (SINGULAIR) 10 mg tablet    Multiple Vitamins-Minerals (OCUTABS-LUTEIN) TABS    pantoprazole (PROTONIX) 40 mg tablet    potassium chloride (KLOR-CON M20) 20 mEq tablet    predniSONE 5 mg tablet    simethicone (MYLICON) 80 mg chewable tablet    timolol (TIMOPTIC) 0 25 % ophthalmic solution    Zinc 50 MG CAPS    Allergies   Allergen Reactions    Brimonidine      Red eyes    Tramadol Nausea Only           Objective     Blood pressure 120/60, pulse 62, temperature (!) 97 2 °F (36 2 °C), height 5' (1 524 m), weight 51 7 kg (114 lb)  Body mass index is 22 26 kg/m²  PHYSICAL EXAM:      General Appearance:   Alert, cooperative, no distress   HEENT:   Normocephalic, atraumatic, anicteric    Neck:  Supple, symmetrical, trachea midline   Lungs:   Clear to auscultation bilaterally; no rales, rhonchi or wheezing; respirations unlabored    Heart[de-identified]   Regular rate and rhythm; no murmur, rub, or gallop  Abdomen:   Soft, non-tender, non-distended; normal bowel sounds; no masses, no organomegaly    Genitalia:   Deferred    Rectal:   Deferred    Extremities:  No cyanosis, clubbing or edema    Pulses:  2+ and symmetric    Skin:  No jaundice, rashes, or lesions    Lymph nodes:  No palpable cervical lymphadenopathy        Lab Results:   No visits with results within 1 Day(s) from this visit  Latest known visit with results is:   Hospital Outpatient Visit on 08/17/2020   Component Date Value    POC Glucose 08/17/2020 92          Radiology Results:   No results found

## 2020-08-24 NOTE — TELEPHONE ENCOUNTER
----- Message from Alejandra Romero PA-C sent at 8/24/2020 11:48 AM EDT -----  Please submit to insurance of approval of Entyvio 300mg IV infusions at week 0, 2, 6, and then every 8 weeks for Crohn's disease  Submit with office note for detailed information

## 2020-08-25 DIAGNOSIS — K50.819 CROHN'S DISEASE OF SMALL AND LARGE INTESTINES WITH COMPLICATION (HCC): ICD-10-CM

## 2020-08-25 RX ORDER — PREDNISONE 1 MG/1
TABLET ORAL
Qty: 252 TABLET | Refills: 0 | Status: CANCELLED | OUTPATIENT
Start: 2020-08-25

## 2020-08-25 NOTE — TELEPHONE ENCOUNTER
Please resend prednisone to [facility]  [facility] does not fill maintenance drugs

## 2020-08-31 ENCOUNTER — APPOINTMENT (OUTPATIENT)
Dept: RADIOLOGY | Facility: CLINIC | Age: 70
End: 2020-08-31
Payer: COMMERCIAL

## 2020-08-31 ENCOUNTER — HOSPITAL ENCOUNTER (OUTPATIENT)
Dept: CT IMAGING | Facility: CLINIC | Age: 70
Discharge: HOME | End: 2020-08-31
Payer: COMMERCIAL

## 2020-08-31 DIAGNOSIS — K50.819 CROHN'S DISEASE OF SMALL AND LARGE INTESTINES WITH COMPLICATION (HCC): ICD-10-CM

## 2020-08-31 PROCEDURE — G1004 CDSM NDSC: HCPCS

## 2020-08-31 PROCEDURE — 71046 X-RAY EXAM CHEST 2 VIEWS: CPT

## 2020-08-31 PROCEDURE — 74177 CT ABD & PELVIS W/CONTRAST: CPT

## 2020-08-31 RX ADMIN — IOHEXOL 100 ML: 350 INJECTION, SOLUTION INTRAVENOUS at 10:58

## 2020-09-02 ENCOUNTER — TELEPHONE (OUTPATIENT)
Dept: GASTROENTEROLOGY | Facility: CLINIC | Age: 70
End: 2020-09-02

## 2020-09-04 ENCOUNTER — APPOINTMENT (OUTPATIENT)
Dept: LAB | Facility: CLINIC | Age: 70
End: 2020-09-04
Payer: COMMERCIAL

## 2020-09-04 DIAGNOSIS — K50.819 CROHN'S DISEASE OF SMALL AND LARGE INTESTINES WITH COMPLICATION (HCC): ICD-10-CM

## 2020-09-04 PROCEDURE — 87493 C DIFF AMPLIFIED PROBE: CPT

## 2020-09-04 PROCEDURE — 83993 ASSAY FOR CALPROTECTIN FECAL: CPT

## 2020-09-05 LAB — C DIFF TOX GENS STL QL NAA+PROBE: NEGATIVE

## 2020-09-08 NOTE — TELEPHONE ENCOUNTER
Spoke to patient and relayed results  She still did not start the Prednisone as previously recommended  I explained the utmost importance again of starting the Prednisone! She reports she will get it from the pharmacy and start it    Awaiting Entyvio approval

## 2020-09-09 ENCOUNTER — TELEPHONE (OUTPATIENT)
Dept: GASTROENTEROLOGY | Facility: CLINIC | Age: 70
End: 2020-09-09

## 2020-09-10 ENCOUNTER — TELEPHONE (OUTPATIENT)
Dept: GASTROENTEROLOGY | Facility: CLINIC | Age: 70
End: 2020-09-10

## 2020-09-10 NOTE — TELEPHONE ENCOUNTER
[facility] called, they faxed over paperwork because they are requiring more information for pt's prior auth for the [place], they say this is urgent  Please call [phone] or fax paperwork to [phone]   Ty

## 2020-09-11 ENCOUNTER — TELEPHONE (OUTPATIENT)
Dept: GASTROENTEROLOGY | Facility: CLINIC | Age: 70
End: 2020-09-11

## 2020-09-11 LAB — CALPROTECTIN STL-MCNT: 505 UG/G (ref 0–120)

## 2020-09-11 NOTE — TELEPHONE ENCOUNTER
Greta  I need you to put the order for infusion in to [facility] for Entyivo  So I can make appt   For patient

## 2020-09-14 ENCOUNTER — TELEPHONE (OUTPATIENT)
Dept: GASTROENTEROLOGY | Facility: CLINIC | Age: 70
End: 2020-09-14

## 2020-09-14 DIAGNOSIS — K50.018 CROHN'S DISEASE OF SMALL INTESTINE WITH OTHER COMPLICATION (HCC): Primary | ICD-10-CM

## 2020-09-14 RX ORDER — ACETAMINOPHEN 325 MG/1
650 TABLET ORAL ONCE
Status: CANCELLED | OUTPATIENT
Start: 2020-09-18

## 2020-09-14 RX ORDER — ONDANSETRON 4 MG/1
4 TABLET, FILM COATED ORAL EVERY 8 HOURS PRN
Qty: 20 TABLET | Refills: 0 | Status: SHIPPED | OUTPATIENT
Start: 2020-09-14 | End: 2020-09-19 | Stop reason: SDUPTHER

## 2020-09-14 RX ORDER — DIPHENHYDRAMINE HCL 25 MG
25 TABLET ORAL ONCE
Status: CANCELLED | OUTPATIENT
Start: 2020-09-18

## 2020-09-14 RX ORDER — SODIUM CHLORIDE 9 MG/ML
20 INJECTION, SOLUTION INTRAVENOUS ONCE
Status: CANCELLED | OUTPATIENT
Start: 2020-09-18

## 2020-09-14 NOTE — TELEPHONE ENCOUNTER
Spoke to patient she is schedule for her infusions but she is having some nausea from the prednisone and want something to help

## 2020-09-17 ENCOUNTER — TELEPHONE (OUTPATIENT)
Dept: GASTROENTEROLOGY | Facility: CLINIC | Age: 70
End: 2020-09-17

## 2020-09-19 DIAGNOSIS — K50.018 CROHN'S DISEASE OF SMALL INTESTINE WITH OTHER COMPLICATION (HCC): ICD-10-CM

## 2020-09-19 RX ORDER — ONDANSETRON 4 MG/1
4 TABLET, FILM COATED ORAL EVERY 8 HOURS PRN
Qty: 60 TABLET | Refills: 0 | Status: SHIPPED | OUTPATIENT
Start: 2020-09-19

## 2020-09-23 ENCOUNTER — HOSPITAL ENCOUNTER (OUTPATIENT)
Dept: INFUSION CENTER | Facility: CLINIC | Age: 70
Discharge: HOME | End: 2020-09-23
Payer: COMMERCIAL

## 2020-09-23 VITALS
HEART RATE: 93 BPM | RESPIRATION RATE: 18 BRPM | OXYGEN SATURATION: 100 % | DIASTOLIC BLOOD PRESSURE: 77 MMHG | SYSTOLIC BLOOD PRESSURE: 133 MMHG | WEIGHT: 115.96 LBS | TEMPERATURE: 98 F | BODY MASS INDEX: 22.65 KG/M2

## 2020-09-23 DIAGNOSIS — K50.018 CROHN'S DISEASE OF SMALL INTESTINE WITH OTHER COMPLICATION (HCC): Primary | ICD-10-CM

## 2020-09-23 PROCEDURE — 96413 CHEMO IV INFUSION 1 HR: CPT

## 2020-09-23 RX ORDER — DIPHENHYDRAMINE HCL 25 MG
25 TABLET ORAL ONCE
Status: COMPLETED | OUTPATIENT
Start: 2020-09-23 | End: 2020-09-23

## 2020-09-23 RX ORDER — ACETAMINOPHEN 325 MG/1
650 TABLET ORAL ONCE
Status: COMPLETED | OUTPATIENT
Start: 2020-09-23 | End: 2020-09-23

## 2020-09-23 RX ORDER — DIPHENHYDRAMINE HCL 25 MG
25 TABLET ORAL ONCE
Status: CANCELLED | OUTPATIENT
Start: 2020-10-07

## 2020-09-23 RX ORDER — SODIUM CHLORIDE 9 MG/ML
20 INJECTION, SOLUTION INTRAVENOUS ONCE
Status: CANCELLED | OUTPATIENT
Start: 2020-10-07

## 2020-09-23 RX ORDER — SODIUM CHLORIDE 9 MG/ML
20 INJECTION, SOLUTION INTRAVENOUS ONCE
Status: COMPLETED | OUTPATIENT
Start: 2020-09-23 | End: 2020-09-23

## 2020-09-23 RX ORDER — ACETAMINOPHEN 325 MG/1
650 TABLET ORAL ONCE
Status: CANCELLED | OUTPATIENT
Start: 2020-10-07

## 2020-09-23 RX ADMIN — DIPHENHYDRAMINE HCL 25 MG: 25 TABLET, COATED ORAL at 13:59

## 2020-09-23 RX ADMIN — VEDOLIZUMAB 300 MG: 300 INJECTION, POWDER, LYOPHILIZED, FOR SOLUTION INTRAVENOUS at 14:26

## 2020-09-23 RX ADMIN — ACETAMINOPHEN 650 MG: 325 TABLET, FILM COATED ORAL at 13:59

## 2020-09-23 RX ADMIN — SODIUM CHLORIDE 20 ML/HR: 0.9 INJECTION, SOLUTION INTRAVENOUS at 13:59

## 2020-09-28 ENCOUNTER — OFFICE VISIT (OUTPATIENT)
Dept: GASTROENTEROLOGY | Facility: CLINIC | Age: 70
End: 2020-09-28
Payer: COMMERCIAL

## 2020-09-28 VITALS
HEART RATE: 68 BPM | DIASTOLIC BLOOD PRESSURE: 70 MMHG | WEIGHT: 113 LBS | SYSTOLIC BLOOD PRESSURE: 130 MMHG | TEMPERATURE: 97.3 F | BODY MASS INDEX: 21.34 KG/M2 | HEIGHT: 61 IN

## 2020-09-28 DIAGNOSIS — D50.9 IRON DEFICIENCY ANEMIA, UNSPECIFIED IRON DEFICIENCY ANEMIA TYPE: ICD-10-CM

## 2020-09-28 DIAGNOSIS — K50.819 CROHN'S DISEASE OF SMALL AND LARGE INTESTINES WITH COMPLICATION (HCC): Primary | ICD-10-CM

## 2020-09-28 PROCEDURE — 99213 OFFICE O/P EST LOW 20 MIN: CPT | Performed by: PHYSICIAN ASSISTANT

## 2020-09-28 RX ORDER — MULTIVIT-MIN/IRON/FOLIC ACID/K 18-600-40
CAPSULE ORAL
COMMUNITY

## 2020-09-28 RX ORDER — VEDOLIZUMAB 300 MG/5ML
INJECTION, POWDER, LYOPHILIZED, FOR SOLUTION INTRAVENOUS
COMMUNITY
Start: 2020-09-10 | End: 2021-04-26 | Stop reason: SDUPTHER

## 2020-09-28 NOTE — PROGRESS NOTES
[facility] - Outpatient Follow-up Note  Felisha Lewis 81 y o  female MRN: [id number]  Encounter: [id number]          ASSESSMENT AND PLAN:      1  Crohn's disease  2  Iron deficiency anemia    Patient has a long, complicated history   She originally was diagnosed with UC and underwent a total proctocolectomy with IPAA in 2007  In 2019, she was admitted with a SBO and found to have a stricture at the pouch anal anastomosis and adhesions and underwent dilation of the stricture complicated by microperforation, ex lap, RONA, and creation of loop ileostomy (intraoperatively she was noted to have large linear ulcerations without the pouch and afferent limb of the small bowel)  She then had an ileostomy reversal in 10/2019  Recent colonoscopy in March by her colorectal surgeon Dr Rae Huynh [facility] showed an anal stricture with aphthous ulcerations in the afferent limp, a hemorrhagic appearance and inflammation of the ileum consistent with SB crohn's disease and she was referred here to start biological treatment   She was also given a Prednisone taper at that time in the spring which temporarily helped her symptoms  She did not follow up here however until last month  EGD was normal (she had reported prior black stools so this test was performed)  HGB 9 3  ESR dramatically elevated at >130 and CRP 9 3  CT enterography showed ileitis and pouchitis with high grade narrowing at the IPA anastomosis  Prebiological testing for Hep B and TB were negative  She had her influenza vaccine  She was started on a slow Prednisone taper and Entyvio 300mg infusions  She received her first Entyvio infusion on 9/23 and is scheduled for her week 2 and week 6 induction phase infusions  She is currently down to 30mg daily on the Prednisone taper  Continue current regimen  Follow up in 6 weeks with labs prior    _____________________________________________________________________    SUBJECTIVE: Patient is a 73-year-old female who presents to the office for follow-up of her severe Crohn's disease  She is currently on a prednisone taper and just began Entyvio infusions with her 1st infusion on September 23rd  She denies any difficulties with the Entyvio infusion  She does report some nausea with the prednisone but states she is  it into 2 doses a day and that has alleviated the nausea  She reports overall she is seeing improvement in her symptoms  She reports she is down to about 4 bowel movements a day which are more formed  She reports less rectal bleeding  She also states that her abdominal discomfort is less  She reports improvement in her appetite with the prednisone and states she is gaining weight back  She had her influenza vaccine  REVIEW OF SYSTEMS IS OTHERWISE NEGATIVE        Historical Information   Past Medical History:   Diagnosis Date    Asthma     [facility]) ([facility] )     Crohn's disease ([facility] )     Diabetes mellitus ([place] [address] )     Hypertension     Ocular hypertension      Past Surgical History:   Procedure Laterality Date    CATARACT EXTRACTION      COLON SURGERY      Partial colectomy, partial removal of the rectum    EXPLORATORY LAPAROTOMY      ILEOSTOMY       Social History   Social History     Substance and Sexual Activity   Alcohol Use No     Social History     Substance and Sexual Activity   Drug Use No     Social History     Tobacco Use   Smoking Status Never Smoker   Smokeless Tobacco Never Used     Family History   Problem Relation Age of Onset    Diabetes Mother     Hypertension Mother        Meds/Allergies       Current Outpatient Medications:     albuterol (PROVENTIL HFA,VENTOLIN HFA) 90 mcg/act inhaler    Ascorbic Acid (Vitamin C) 500 MG CAPS    Calcium Carb-Cholecalciferol (CALCIUM 600 + D PO)    Cyanocobalamin (B-12) 5000 MCG CAPS    dorzolamide-timolol (COSOPT) 22 3-6 8 MG/ML ophthalmic solution    Entyvio 300 MG SOLR    Folic Acid 20 MG CAPS    levothyroxine 25 mcg tablet    lisinopril (ZESTRIL) 5 mg tablet    MAGNESIUM PO    metFORMIN (GLUCOPHAGE) 500 mg tablet    mometasone (NASONEX) 50 mcg/act nasal spray    mometasone-formoterol (DULERA) 200-5 MCG/ACT inhaler    montelukast (SINGULAIR) 10 mg tablet    Multiple Vitamins-Minerals (OCUTABS-LUTEIN) TABS    Nebulizers (Nebulizer Compressor) MISC    ondansetron (ZOFRAN) 4 mg tablet    potassium chloride (KLOR-CON M20) 20 mEq tablet    predniSONE 5 mg tablet    simethicone (MYLICON) 80 mg chewable tablet    timolol (TIMOPTIC) 0 25 % ophthalmic solution    Zinc 50 MG CAPS    Allergies   Allergen Reactions    Brimonidine      Red eyes    Tramadol Nausea Only           Objective     Blood pressure 130/70, pulse 68, temperature (!) 97 3 °F (36 3 °C), height 5' 1" (1 549 m), weight 51 3 kg (113 lb)  Body mass index is 21 35 kg/m²  PHYSICAL EXAM:      General Appearance:   Alert, cooperative, no distress   HEENT:   Normocephalic, atraumatic, anicteric    Neck:  Supple, symmetrical, trachea midline   Lungs:   Clear to auscultation bilaterally; no rales, rhonchi or wheezing; respirations unlabored    Heart[de-identified]   Regular rate and rhythm; no murmur, rub, or gallop  Abdomen:   Soft, non-tender, non-distended; normal bowel sounds; no masses, no organomegaly    Genitalia:   Deferred    Rectal:   Deferred    Extremities:  No cyanosis, clubbing or edema    Pulses:  2+ and symmetric    Skin:  No jaundice, rashes, or lesions    Lymph nodes:  No palpable cervical lymphadenopathy        Lab Results:   No visits with results within 1 Day(s) from this visit  Latest known visit with results is:   Appointment on 09/04/2020   Component Date Value    C difficile toxin by PCR 09/04/2020 Negative     Calprotectin 09/04/2020 505*         Radiology Results:   Xr Chest Pa & Lateral    Result Date: 9/3/2020  Narrative: CHEST INDICATION:   Abdominal pain    History of Crohn's disease  COMPARISON:  None EXAM PERFORMED/VIEWS:  XR CHEST PA & LATERAL FINDINGS: Cardiomediastinal silhouette appears unremarkable  The lungs are clear  No pneumothorax or pleural effusion  Minimal, age-appropriate thoracic spondylosis  No radiographic sequelae of inflammatory spondyloarthropathy  Impression: No acute cardiopulmonary findings  Workstation performed: [id number]     Ct Small Bowel Enterography    Result Date: 9/3/2020  Narrative: CT ABDOMEN AND PELVIS WITH IV CONTRAST- ENTEROGRAPHY - WITH CONTRAST INDICATION: Crohn's disease  Total proctocolectomy with ileal pouch-anal anastomosis in 2007  In 2019, she was admitted with a SBO and found to have a stricture at the pouch anal anastomosis and adhesions and underwent dilation of the stricture complicated by microperforation, ex lap, RONA, and creation of loop ileostomy  Ileostomy reversal in 10/2019  Recent colonoscopy in March showed an anal stricture with aphthous ulcerations in the afferent limp, a hemorrhagic appearance and inflammation of  the ileum consistent with SB Crohn's disease and she was referred here to start biological treatment  COMPARISON:  CT abdomen and pelvis 8/8/2011  MR abdomen 10/24/2012  TECHNIQUE:  Contrast-enhanced CT examination of the abdomen and pelvis was performed utilizing thin section technique and after the administration of low density enteric contrast according to protocol designed specifically to obtain sensitive evaluation of the small bowel  Axial, sagittal, and coronal 2D reformatted images were created from the source data and submitted for interpretation  Radiation dose length product (DLP) for this visit:  792 mGy-cm   This examination, like all CT scans performed in the [facility], was performed utilizing techniques to minimize radiation dose exposure, including the use of iterative reconstruction and automated exposure control   IV Contrast:  100 mL of iohexol (OMNIPAQUE) Enteric Contrast:  1500 cc of VoLumen enteric contrast was administered  FINDINGS: BOWEL: Postsurgical changes from total proctocolectomy with ileal pouch-anal anastomosis  There is increased dominantly ileal small bowel dilatation particularly of the ileal pouch with air-fluid level  There is a transition with high-grade narrowing at  the ileal pouch-anal anastomosis (series 204 image 94) suggestive of at least partial obstruction  There is also luminal narrowing, wall thickening and enhancement at the ileal-pouch anastomosis measuring approximately 2 cm (series 201 image 173) in keeping with stricture  There is wall thickening and submucosal hyperenhancement of the neoterminal ileum and pouch in keeping with active inflammation  No rim-enhancing fluid collection to suggest abscess  No fistula or sinus tract is seen  Small hiatal hernia  REMAINDER OF THE ABDOMEN AND PELVIS: LOWER CHEST:  Mild right basilar atelectatic changes  LIVER/BILIARY TREE:  No suspicious liver mass  Subcentimeter hypodensity in the right hepatic lobe, too small to characterize  No intrahepatic or extrahepatic biliary ductal dilatation  GALLBLADDER:  Cholelithiasis without pericholecystic inflammatory changes  SPLEEN:  Unremarkable  PANCREAS:  Unremarkable  ADRENAL GLANDS:  Unremarkable  KIDNEYS/URETERS:  Unremarkable  No hydronephrosis  ABDOMINOPELVIC CAVITY:  No pneumoperitoneum  There are prominent to mildly enlarged reactive mesenteric lymph nodes for example measuring 1 5 x 1 1 cm (series 201 image 148)  VESSELS:  Unremarkable for patient's age  PELVIS REPRODUCTIVE ORGANS: The uterus is present  URINARY BLADDER:  Unremarkable  ABDOMINAL WALL/INGUINAL REGIONS:  Postsurgical changes in the anterior abdominal wall  OSSEOUS STRUCTURES:  No acute fracture or destructive osseous lesion  Degenerative changes of the spine  Impression: Postsurgical changes from prior proctocolectomy with ileal pouch-anal anastomosis    Ileitis and pouchitis with high-grade narrowing at the ileal pouch-anal anastomosis suggestive of at least partial obstruction  Additional area of stricturing more proximally at the ileal-pouch anastomosis  No findings of penetrating disease  The study was marked in [facility] for immediate notification   Workstation performed: [id number]

## 2020-10-07 ENCOUNTER — HOSPITAL ENCOUNTER (OUTPATIENT)
Dept: INFUSION CENTER | Facility: CLINIC | Age: 70
End: 2020-10-07

## 2020-10-09 DIAGNOSIS — K50.018 CROHN'S DISEASE OF SMALL INTESTINE WITH OTHER COMPLICATION (HCC): Primary | ICD-10-CM

## 2020-10-09 RX ORDER — SODIUM CHLORIDE 9 MG/ML
20 INJECTION, SOLUTION INTRAVENOUS ONCE
Status: CANCELLED | OUTPATIENT
Start: 2020-10-13

## 2020-10-09 RX ORDER — DIPHENHYDRAMINE HCL 25 MG
25 TABLET ORAL ONCE
Status: CANCELLED | OUTPATIENT
Start: 2020-10-13

## 2020-10-09 RX ORDER — ACETAMINOPHEN 325 MG/1
650 TABLET ORAL ONCE
Status: CANCELLED | OUTPATIENT
Start: 2020-10-13

## 2020-10-30 ENCOUNTER — TELEPHONE (OUTPATIENT)
Dept: GASTROENTEROLOGY | Facility: CLINIC | Age: 70
End: 2020-10-30

## 2020-11-03 ENCOUNTER — TELEPHONE (OUTPATIENT)
Dept: GASTROENTEROLOGY | Facility: CLINIC | Age: 70
End: 2020-11-03

## 2020-11-03 RX ORDER — SODIUM CHLORIDE 9 MG/ML
20 INJECTION, SOLUTION INTRAVENOUS ONCE
Status: CANCELLED | OUTPATIENT
Start: 2020-11-04

## 2020-11-04 ENCOUNTER — HOSPITAL ENCOUNTER (OUTPATIENT)
Dept: INFUSION CENTER | Facility: CLINIC | Age: 70
Discharge: HOME | End: 2020-11-04
Payer: COMMERCIAL

## 2020-11-04 ENCOUNTER — HOSPITAL ENCOUNTER (OUTPATIENT)
Dept: INFUSION CENTER | Facility: CLINIC | Age: 70
Discharge: HOME | End: 2020-11-04

## 2020-11-04 VITALS
HEART RATE: 74 BPM | SYSTOLIC BLOOD PRESSURE: 126 MMHG | DIASTOLIC BLOOD PRESSURE: 59 MMHG | RESPIRATION RATE: 18 BRPM | TEMPERATURE: 97.9 F

## 2020-11-04 DIAGNOSIS — K50.018 CROHN'S DISEASE OF SMALL INTESTINE WITH OTHER COMPLICATION (HCC): Primary | ICD-10-CM

## 2020-11-04 PROCEDURE — 96413 CHEMO IV INFUSION 1 HR: CPT

## 2020-11-04 RX ORDER — DIPHENHYDRAMINE HCL 25 MG
25 TABLET ORAL ONCE
Status: COMPLETED | OUTPATIENT
Start: 2020-11-04 | End: 2020-11-04

## 2020-11-04 RX ORDER — ACETAMINOPHEN 325 MG/1
650 TABLET ORAL ONCE
Status: CANCELLED
Start: 2020-11-17

## 2020-11-04 RX ORDER — ACETAMINOPHEN 325 MG/1
650 TABLET ORAL ONCE
Status: COMPLETED | OUTPATIENT
Start: 2020-11-04 | End: 2020-11-04

## 2020-11-04 RX ORDER — ACETAMINOPHEN 500 MG
500 TABLET ORAL ONCE
Status: DISCONTINUED | OUTPATIENT
Start: 2020-11-04 | End: 2020-11-04

## 2020-11-04 RX ORDER — DIPHENHYDRAMINE HYDROCHLORIDE 50 MG/ML
50 INJECTION INTRAMUSCULAR; INTRAVENOUS ONCE
Status: DISCONTINUED | OUTPATIENT
Start: 2020-11-04 | End: 2020-11-04

## 2020-11-04 RX ORDER — DIPHENHYDRAMINE HCL 25 MG
25 TABLET ORAL ONCE
Status: CANCELLED
Start: 2020-11-17

## 2020-11-04 RX ORDER — SODIUM CHLORIDE 9 MG/ML
20 INJECTION, SOLUTION INTRAVENOUS ONCE
Status: COMPLETED | OUTPATIENT
Start: 2020-11-04 | End: 2020-11-04

## 2020-11-04 RX ORDER — SODIUM CHLORIDE 9 MG/ML
20 INJECTION, SOLUTION INTRAVENOUS ONCE
Status: CANCELLED | OUTPATIENT
Start: 2020-11-17

## 2020-11-04 RX ORDER — SODIUM CHLORIDE 9 MG/ML
20 INJECTION, SOLUTION INTRAVENOUS ONCE
Status: CANCELLED | OUTPATIENT
Start: 2020-11-18

## 2020-11-04 RX ADMIN — SODIUM CHLORIDE 20 ML/HR: 0.9 INJECTION, SOLUTION INTRAVENOUS at 14:59

## 2020-11-04 RX ADMIN — VEDOLIZUMAB 300 MG: 300 INJECTION, POWDER, LYOPHILIZED, FOR SOLUTION INTRAVENOUS at 15:34

## 2020-11-04 RX ADMIN — DIPHENHYDRAMINE HCL 25 MG: 25 TABLET, COATED ORAL at 15:00

## 2020-11-04 RX ADMIN — ACETAMINOPHEN 650 MG: 325 TABLET, FILM COATED ORAL at 15:00

## 2020-11-06 ENCOUNTER — LAB (OUTPATIENT)
Dept: LAB | Facility: CLINIC | Age: 70
End: 2020-11-06
Payer: COMMERCIAL

## 2020-11-06 DIAGNOSIS — K50.819 CROHN'S DISEASE OF SMALL AND LARGE INTESTINES WITH COMPLICATION (HCC): ICD-10-CM

## 2020-11-06 LAB
ALBUMIN SERPL BCP-MCNC: 2.8 G/DL (ref 3.5–5)
ALP SERPL-CCNC: 77 U/L (ref 46–116)
ALT SERPL W P-5'-P-CCNC: 21 U/L (ref 12–78)
ANION GAP SERPL CALCULATED.3IONS-SCNC: 3 MMOL/L (ref 4–13)
AST SERPL W P-5'-P-CCNC: 9 U/L (ref 5–45)
BASOPHILS # BLD AUTO: 0.05 THOUSANDS/ΜL (ref 0–0.1)
BASOPHILS NFR BLD AUTO: 1 % (ref 0–1)
BILIRUB SERPL-MCNC: 0.21 MG/DL (ref 0.2–1)
BUN SERPL-MCNC: 11 MG/DL (ref 5–25)
CALCIUM ALBUM COR SERPL-MCNC: 10.1 MG/DL (ref 8.3–10.1)
CALCIUM SERPL-MCNC: 9.1 MG/DL (ref 8.3–10.1)
CHLORIDE SERPL-SCNC: 108 MMOL/L (ref 100–108)
CO2 SERPL-SCNC: 28 MMOL/L (ref 21–32)
CREAT SERPL-MCNC: 0.96 MG/DL (ref 0.6–1.3)
CRP SERPL QL: 13.1 MG/L
EOSINOPHIL # BLD AUTO: 0.1 THOUSAND/ΜL (ref 0–0.61)
EOSINOPHIL NFR BLD AUTO: 2 % (ref 0–6)
ERYTHROCYTE [DISTWIDTH] IN BLOOD BY AUTOMATED COUNT: 20.7 % (ref 11.6–15.1)
FERRITIN SERPL-MCNC: 19 NG/ML (ref 8–388)
GFR SERPL CREATININE-BSD FRML MDRD: 71 ML/MIN/1.73SQ M
GLUCOSE SERPL-MCNC: 90 MG/DL (ref 65–140)
HCT VFR BLD AUTO: 35.4 % (ref 34.8–46.1)
HGB BLD-MCNC: 9.7 G/DL (ref 11.5–15.4)
IMM GRANULOCYTES # BLD AUTO: 0.02 THOUSAND/UL (ref 0–0.2)
IMM GRANULOCYTES NFR BLD AUTO: 0 % (ref 0–2)
IRON SATN MFR SERPL: 4 %
IRON SERPL-MCNC: 20 UG/DL (ref 50–170)
LYMPHOCYTES # BLD AUTO: 2.28 THOUSANDS/ΜL (ref 0.6–4.47)
LYMPHOCYTES NFR BLD AUTO: 41 % (ref 14–44)
MCH RBC QN AUTO: 22.4 PG (ref 26.8–34.3)
MCHC RBC AUTO-ENTMCNC: 27.4 G/DL (ref 31.4–37.4)
MCV RBC AUTO: 82 FL (ref 82–98)
MONOCYTES # BLD AUTO: 0.79 THOUSAND/ΜL (ref 0.17–1.22)
MONOCYTES NFR BLD AUTO: 14 % (ref 4–12)
NEUTROPHILS # BLD AUTO: 2.31 THOUSANDS/ΜL (ref 1.85–7.62)
NEUTS SEG NFR BLD AUTO: 42 % (ref 43–75)
NRBC BLD AUTO-RTO: 0 /100 WBCS
PLATELET # BLD AUTO: 460 THOUSANDS/UL (ref 149–390)
PMV BLD AUTO: 10.4 FL (ref 8.9–12.7)
POTASSIUM SERPL-SCNC: 4.1 MMOL/L (ref 3.5–5.3)
PROT SERPL-MCNC: 7.7 G/DL (ref 6.4–8.2)
RBC # BLD AUTO: 4.33 MILLION/UL (ref 3.81–5.12)
SODIUM SERPL-SCNC: 139 MMOL/L (ref 136–145)
TIBC SERPL-MCNC: 479 UG/DL (ref 250–450)
WBC # BLD AUTO: 5.55 THOUSAND/UL (ref 4.31–10.16)

## 2020-11-06 PROCEDURE — 80053 COMPREHEN METABOLIC PANEL: CPT

## 2020-11-06 PROCEDURE — 85025 COMPLETE CBC W/AUTO DIFF WBC: CPT

## 2020-11-06 PROCEDURE — 86140 C-REACTIVE PROTEIN: CPT

## 2020-11-06 PROCEDURE — 83550 IRON BINDING TEST: CPT

## 2020-11-06 PROCEDURE — 82728 ASSAY OF FERRITIN: CPT

## 2020-11-06 PROCEDURE — 83540 ASSAY OF IRON: CPT

## 2020-11-06 PROCEDURE — 36415 COLL VENOUS BLD VENIPUNCTURE: CPT

## 2020-11-16 ENCOUNTER — OFFICE VISIT (OUTPATIENT)
Dept: GASTROENTEROLOGY | Facility: CLINIC | Age: 70
End: 2020-11-16
Payer: COMMERCIAL

## 2020-11-16 VITALS
WEIGHT: 113 LBS | DIASTOLIC BLOOD PRESSURE: 70 MMHG | HEART RATE: 80 BPM | SYSTOLIC BLOOD PRESSURE: 122 MMHG | BODY MASS INDEX: 21.34 KG/M2 | HEIGHT: 61 IN | TEMPERATURE: 96.7 F

## 2020-11-16 DIAGNOSIS — K50.018 CROHN'S DISEASE OF SMALL INTESTINE WITH OTHER COMPLICATION (HCC): Primary | ICD-10-CM

## 2020-11-16 DIAGNOSIS — D50.9 IRON DEFICIENCY ANEMIA, UNSPECIFIED IRON DEFICIENCY ANEMIA TYPE: ICD-10-CM

## 2020-11-16 PROCEDURE — 99214 OFFICE O/P EST MOD 30 MIN: CPT | Performed by: PHYSICIAN ASSISTANT

## 2020-11-16 RX ORDER — PREDNISONE 1 MG/1
TABLET ORAL
Qty: 147 TABLET | Refills: 0 | Status: SHIPPED | OUTPATIENT
Start: 2020-11-16 | End: 2021-01-25 | Stop reason: SDUPTHER

## 2020-11-17 ENCOUNTER — HOSPITAL ENCOUNTER (OUTPATIENT)
Dept: INFUSION CENTER | Facility: CLINIC | Age: 70
Discharge: HOME | End: 2020-11-17
Payer: COMMERCIAL

## 2020-11-17 VITALS
HEART RATE: 80 BPM | DIASTOLIC BLOOD PRESSURE: 68 MMHG | SYSTOLIC BLOOD PRESSURE: 133 MMHG | RESPIRATION RATE: 18 BRPM | TEMPERATURE: 96.9 F

## 2020-11-17 DIAGNOSIS — K50.018 CROHN'S DISEASE OF SMALL INTESTINE WITH OTHER COMPLICATION (HCC): Primary | ICD-10-CM

## 2020-11-17 PROCEDURE — 96413 CHEMO IV INFUSION 1 HR: CPT

## 2020-11-17 RX ORDER — ACETAMINOPHEN 325 MG/1
650 TABLET ORAL ONCE
Status: COMPLETED | OUTPATIENT
Start: 2020-11-17 | End: 2020-11-17

## 2020-11-17 RX ORDER — SODIUM CHLORIDE 9 MG/ML
20 INJECTION, SOLUTION INTRAVENOUS ONCE
Status: CANCELLED | OUTPATIENT
Start: 2020-12-16

## 2020-11-17 RX ORDER — DIPHENHYDRAMINE HCL 25 MG
25 TABLET ORAL ONCE
Status: CANCELLED
Start: 2020-12-16

## 2020-11-17 RX ORDER — SODIUM CHLORIDE 9 MG/ML
20 INJECTION, SOLUTION INTRAVENOUS ONCE
Status: CANCELLED | OUTPATIENT
Start: 2020-11-18

## 2020-11-17 RX ORDER — ACETAMINOPHEN 325 MG/1
650 TABLET ORAL ONCE
Status: CANCELLED
Start: 2020-12-16

## 2020-11-17 RX ORDER — SODIUM CHLORIDE 9 MG/ML
20 INJECTION, SOLUTION INTRAVENOUS ONCE
Status: COMPLETED | OUTPATIENT
Start: 2020-11-17 | End: 2020-11-17

## 2020-11-17 RX ORDER — DIPHENHYDRAMINE HCL 25 MG
25 TABLET ORAL ONCE
Status: COMPLETED | OUTPATIENT
Start: 2020-11-17 | End: 2020-11-17

## 2020-11-17 RX ADMIN — VEDOLIZUMAB 300 MG: 300 INJECTION, POWDER, LYOPHILIZED, FOR SOLUTION INTRAVENOUS at 12:39

## 2020-11-17 RX ADMIN — ACETAMINOPHEN 650 MG: 325 TABLET, FILM COATED ORAL at 11:54

## 2020-11-17 RX ADMIN — SODIUM CHLORIDE 20 ML/HR: 0.9 INJECTION, SOLUTION INTRAVENOUS at 11:54

## 2020-11-17 RX ADMIN — DIPHENHYDRAMINE HCL 25 MG: 25 TABLET, COATED ORAL at 11:54

## 2020-12-16 ENCOUNTER — HOSPITAL ENCOUNTER (OUTPATIENT)
Dept: INFUSION CENTER | Facility: CLINIC | Age: 70
Discharge: HOME | End: 2020-12-16
Payer: COMMERCIAL

## 2020-12-16 VITALS
SYSTOLIC BLOOD PRESSURE: 122 MMHG | HEART RATE: 73 BPM | DIASTOLIC BLOOD PRESSURE: 61 MMHG | TEMPERATURE: 97.3 F | RESPIRATION RATE: 18 BRPM

## 2020-12-16 DIAGNOSIS — K50.018 CROHN'S DISEASE OF SMALL INTESTINE WITH OTHER COMPLICATION (HCC): Primary | ICD-10-CM

## 2020-12-16 PROCEDURE — 96413 CHEMO IV INFUSION 1 HR: CPT

## 2020-12-16 RX ORDER — DIPHENHYDRAMINE HCL 25 MG
25 CAPSULE ORAL ONCE
Status: CANCELLED
Start: 2021-02-10

## 2020-12-16 RX ORDER — DIPHENHYDRAMINE HCL 25 MG
25 TABLET ORAL ONCE
Status: COMPLETED | OUTPATIENT
Start: 2020-12-16 | End: 2020-12-16

## 2020-12-16 RX ORDER — SODIUM CHLORIDE 9 MG/ML
20 INJECTION, SOLUTION INTRAVENOUS ONCE
Status: CANCELLED | OUTPATIENT
Start: 2021-02-10

## 2020-12-16 RX ORDER — SODIUM CHLORIDE 9 MG/ML
20 INJECTION, SOLUTION INTRAVENOUS ONCE
Status: COMPLETED | OUTPATIENT
Start: 2020-12-16 | End: 2020-12-16

## 2020-12-16 RX ORDER — ACETAMINOPHEN 325 MG/1
650 TABLET ORAL ONCE
Status: CANCELLED
Start: 2021-02-10

## 2020-12-16 RX ORDER — ACETAMINOPHEN 325 MG/1
650 TABLET ORAL ONCE
Status: COMPLETED | OUTPATIENT
Start: 2020-12-16 | End: 2020-12-16

## 2020-12-16 RX ADMIN — ACETAMINOPHEN 650 MG: 325 TABLET, FILM COATED ORAL at 11:43

## 2020-12-16 RX ADMIN — DIPHENHYDRAMINE HYDROCHLORIDE 25 MG: 25 TABLET ORAL at 11:43

## 2020-12-16 RX ADMIN — VEDOLIZUMAB 300 MG: 300 INJECTION, POWDER, LYOPHILIZED, FOR SOLUTION INTRAVENOUS at 12:18

## 2020-12-16 RX ADMIN — SODIUM CHLORIDE 20 ML/HR: 0.9 INJECTION, SOLUTION INTRAVENOUS at 11:43

## 2021-01-20 ENCOUNTER — LAB (OUTPATIENT)
Dept: LAB | Facility: CLINIC | Age: 71
End: 2021-01-20
Payer: COMMERCIAL

## 2021-01-20 DIAGNOSIS — K50.018 CROHN'S DISEASE OF SMALL INTESTINE WITH OTHER COMPLICATION (HCC): ICD-10-CM

## 2021-01-20 LAB
BASOPHILS # BLD AUTO: 0.05 THOUSANDS/ΜL (ref 0–0.1)
BASOPHILS NFR BLD AUTO: 1 % (ref 0–1)
EOSINOPHIL # BLD AUTO: 0.16 THOUSAND/ΜL (ref 0–0.61)
EOSINOPHIL NFR BLD AUTO: 3 % (ref 0–6)
ERYTHROCYTE [DISTWIDTH] IN BLOOD BY AUTOMATED COUNT: 20.3 % (ref 11.6–15.1)
HCT VFR BLD AUTO: 42.3 % (ref 34.8–46.1)
HGB BLD-MCNC: 12.1 G/DL (ref 11.5–15.4)
IMM GRANULOCYTES # BLD AUTO: 0.02 THOUSAND/UL (ref 0–0.2)
IMM GRANULOCYTES NFR BLD AUTO: 0 % (ref 0–2)
LYMPHOCYTES # BLD AUTO: 2.88 THOUSANDS/ΜL (ref 0.6–4.47)
LYMPHOCYTES NFR BLD AUTO: 44 % (ref 14–44)
MCH RBC QN AUTO: 23.6 PG (ref 26.8–34.3)
MCHC RBC AUTO-ENTMCNC: 28.6 G/DL (ref 31.4–37.4)
MCV RBC AUTO: 83 FL (ref 82–98)
MONOCYTES # BLD AUTO: 0.66 THOUSAND/ΜL (ref 0.17–1.22)
MONOCYTES NFR BLD AUTO: 10 % (ref 4–12)
NEUTROPHILS # BLD AUTO: 2.74 THOUSANDS/ΜL (ref 1.85–7.62)
NEUTS SEG NFR BLD AUTO: 42 % (ref 43–75)
NRBC BLD AUTO-RTO: 0 /100 WBCS
PLATELET # BLD AUTO: 460 THOUSANDS/UL (ref 149–390)
PMV BLD AUTO: 12.5 FL (ref 8.9–12.7)
RBC # BLD AUTO: 5.13 MILLION/UL (ref 3.81–5.12)
WBC # BLD AUTO: 6.51 THOUSAND/UL (ref 4.31–10.16)

## 2021-01-20 PROCEDURE — 85025 COMPLETE CBC W/AUTO DIFF WBC: CPT

## 2021-01-20 PROCEDURE — 36415 COLL VENOUS BLD VENIPUNCTURE: CPT

## 2021-01-22 ENCOUNTER — TRANSCRIBE ORDERS (OUTPATIENT)
Dept: LAB | Facility: HOSPITAL | Age: 71
End: 2021-01-22

## 2021-01-22 DIAGNOSIS — K50.018 CROHN'S DISEASE OF SMALL INTESTINE WITH OTHER COMPLICATION (HCC): Primary | ICD-10-CM

## 2021-01-25 ENCOUNTER — APPOINTMENT (OUTPATIENT)
Dept: LAB | Facility: CLINIC | Age: 71
End: 2021-01-25
Payer: COMMERCIAL

## 2021-01-25 ENCOUNTER — OFFICE VISIT (OUTPATIENT)
Dept: GASTROENTEROLOGY | Facility: CLINIC | Age: 71
End: 2021-01-25
Payer: COMMERCIAL

## 2021-01-25 VITALS
HEART RATE: 82 BPM | HEIGHT: 63 IN | DIASTOLIC BLOOD PRESSURE: 64 MMHG | BODY MASS INDEX: 20.91 KG/M2 | WEIGHT: 118 LBS | SYSTOLIC BLOOD PRESSURE: 122 MMHG

## 2021-01-25 DIAGNOSIS — D50.0 IRON DEFICIENCY ANEMIA DUE TO CHRONIC BLOOD LOSS: Primary | ICD-10-CM

## 2021-01-25 DIAGNOSIS — K50.018 CROHN'S DISEASE OF SMALL INTESTINE WITH OTHER COMPLICATION (HCC): ICD-10-CM

## 2021-01-25 LAB
ALBUMIN SERPL BCP-MCNC: 3.1 G/DL (ref 3.5–5)
ALP SERPL-CCNC: 99 U/L (ref 46–116)
ALT SERPL W P-5'-P-CCNC: 21 U/L (ref 12–78)
ANION GAP SERPL CALCULATED.3IONS-SCNC: 4 MMOL/L (ref 4–13)
AST SERPL W P-5'-P-CCNC: 15 U/L (ref 5–45)
BILIRUB SERPL-MCNC: 0.48 MG/DL (ref 0.2–1)
BUN SERPL-MCNC: 9 MG/DL (ref 5–25)
CALCIUM ALBUM COR SERPL-MCNC: 11 MG/DL (ref 8.3–10.1)
CALCIUM SERPL-MCNC: 10.3 MG/DL (ref 8.3–10.1)
CHLORIDE SERPL-SCNC: 106 MMOL/L (ref 100–108)
CO2 SERPL-SCNC: 31 MMOL/L (ref 21–32)
CREAT SERPL-MCNC: 0.84 MG/DL (ref 0.6–1.3)
CRP SERPL QL: 25.2 MG/L
GFR SERPL CREATININE-BSD FRML MDRD: 83 ML/MIN/1.73SQ M
GLUCOSE P FAST SERPL-MCNC: 96 MG/DL (ref 65–99)
POTASSIUM SERPL-SCNC: 3.9 MMOL/L (ref 3.5–5.3)
PROT SERPL-MCNC: 8.4 G/DL (ref 6.4–8.2)
SODIUM SERPL-SCNC: 141 MMOL/L (ref 136–145)

## 2021-01-25 PROCEDURE — 36415 COLL VENOUS BLD VENIPUNCTURE: CPT

## 2021-01-25 PROCEDURE — 86140 C-REACTIVE PROTEIN: CPT

## 2021-01-25 PROCEDURE — 99213 OFFICE O/P EST LOW 20 MIN: CPT | Performed by: PHYSICIAN ASSISTANT

## 2021-01-25 PROCEDURE — 80053 COMPREHEN METABOLIC PANEL: CPT

## 2021-01-25 RX ORDER — PREDNISONE 1 MG/1
TABLET ORAL
Qty: 147 TABLET | Refills: 0 | Status: SHIPPED | OUTPATIENT
Start: 2021-01-25 | End: 2021-01-27 | Stop reason: SDUPTHER

## 2021-01-25 RX ORDER — HYDROCHLOROTHIAZIDE 25 MG/1
TABLET ORAL
COMMUNITY
Start: 2020-12-02 | End: 2021-10-13 | Stop reason: SDDI

## 2021-01-25 NOTE — PROGRESS NOTES
[facility] - Outpatient Follow-up Note  Loy Kay 78 y o  female MRN: [id number]  Encounter: [id number]          ASSESSMENT AND PLAN:      1  Crohn's disease of small intestine with other complication ([address] )  2  Iron deficiency anemia     Patient has a long, complicated history   She originally was diagnosed with UC and underwent a total proctocolectomy with IPAA in 2007  In 2019, she was admitted with a SBO and found to have a stricture at the pouch anal anastomosis and adhesions and underwent dilation of the stricture complicated by microperforation, ex lap, RONA, and creation of loop ileostomy (intraoperatively she was noted to have large linear ulcerations without the pouch and afferent limb of the small bowel)  She then had an ileostomy reversal in 10/2019  Colonoscopy in March of 2020 by her colorectal surgeon Dr Robson Wild in [facility] showed an anal stricture with aphthous ulcerations in the afferent limp, a hemorrhagic appearance and inflammation of the ileum consistent with SB crohn's disease and she was referred here to start biological treatment  Sae Pride was also given a Prednisone taper at that time in the spring which temporarily helped her symptoms   She did not follow up here until August of 2020  CT enterography 8/31 showed ileitis and pouchitis with high grade narrowing at the IPA anastomosis  Prebiological testing for Hep B and TB were negative   She had her influenza vaccine      She was started on Entyvio 300mg infusions in September (due to her comorbidities and age)  Sae Pride was also started on a slow Prednisone taper  She received her first Entyvio infusion on 9/23 but then cancelled and no showed for the additional induction infusions and had no treatment for over a month  She was restarted on Entyvio 11/4 and has now completed the induction phase  Continue Entyvio 300mg q 8 weeks  Will bridge her with a Prednisone taper    She is scheduled for her next infusion 2/10  Recommend she receive the Covid-19 vaccine    Recent CBC showed improvement in her HGB up to 12 1  Follow up CMP and CRP (there was an error with the lab draw and she is going for a repeat draw today)  Follow up in March  Will likely plan for repeat CT enterography as well as a colonoscopy at that time  ______________________________________________________________________    SUBJECTIVE:  Patient is a pleasant 81-year-old female who presents to the office for follow-up of her Crohn's disease  She was previously started on Entyvio infusions in September but then canceled and no showed for her additional infusions  She went over a month without infusions  She was restarted on the induction infusion 11/4 which she completed and her next infusion is 2/10  She has been off of Prednisone  She states she back to having 4-5 loose stools a day  She denies any rectal bleeding  She does report some mild abdominal discomfort and bloating at times  Her weight is stable from last visit  She reports she had the influenza vaccine  She had negative c diff testing in the fall  She is taking an iron supplement  Recent hemoglobin was improved to 12 1 from 9 7  She is inquiring about the Covid vaccine  REVIEW OF SYSTEMS IS OTHERWISE NEGATIVE        Historical Information   Past Medical History:   Diagnosis Date    Asthma     [facility]     [facility]     Diabetes mellitus ([facility] )     Hypertension     Ocular hypertension      Past Surgical History:   Procedure Laterality Date    CATARACT EXTRACTION      COLON SURGERY      Partial colectomy, partial removal of the rectum    EXPLORATORY LAPAROTOMY      ILEOSTOMY       Social History   Social History     Substance and Sexual Activity   Alcohol Use No     Social History     Substance and Sexual Activity   Drug Use No     Social History     Tobacco Use   Smoking Status Never Smoker   Smokeless Tobacco Never Used     Family History   Problem Relation Age of Onset    Diabetes Mother     Hypertension Mother        Meds/Allergies       Current Outpatient Medications:     albuterol (PROVENTIL HFA,VENTOLIN HFA) 90 mcg/act inhaler    Ascorbic Acid (Vitamin C) 500 MG CAPS    Calcium Carb-Cholecalciferol (CALCIUM 600 + D PO)    Cyanocobalamin (B-12) 5000 MCG CAPS    dorzolamide-timolol (COSOPT) 22 3-6 8 MG/ML ophthalmic solution    Entyvio 265 MG SOLR    Folic Acid 20 MG CAPS    hydrochlorothiazide (HYDRODIURIL) 25 mg tablet    levothyroxine 25 mcg tablet    lisinopril (ZESTRIL) 5 mg tablet    MAGNESIUM PO    metFORMIN (GLUCOPHAGE) 500 mg tablet    mometasone (NASONEX) 50 mcg/act nasal spray    mometasone-formoterol (DULERA) 200-5 MCG/ACT inhaler    montelukast (SINGULAIR) 10 mg tablet    Multiple Vitamins-Minerals (OCUTABS-LUTEIN) TABS    Nebulizers (Nebulizer Compressor) MISC    ondansetron (ZOFRAN) 4 mg tablet    potassium chloride (KLOR-CON M20) 20 mEq tablet    predniSONE 5 mg tablet    simethicone (MYLICON) 80 mg chewable tablet    timolol (TIMOPTIC) 0 25 % ophthalmic solution    Zinc 50 MG CAPS    Allergies   Allergen Reactions    Brimonidine      Red eyes    Tramadol Nausea Only           Objective     Blood pressure 122/64, pulse 82, height 5' 3" (1 6 m), weight 53 5 kg (118 lb)  Body mass index is 20 9 kg/m²  PHYSICAL EXAM:      General Appearance:   Alert, cooperative, no distress   HEENT:   Normocephalic, atraumatic, anicteric      Neck:  Supple, symmetrical, trachea midline   Lungs:   Clear to auscultation bilaterally; no rales, rhonchi or wheezing; respirations unlabored    Heart[de-identified]   Regular rate and rhythm; no murmur, rub, or gallop     Abdomen:   Soft, non-tender, non-distended; normal bowel sounds; no masses, no organomegaly    Genitalia:   Deferred    Rectal:   Deferred    Extremities:  No cyanosis, clubbing or edema    Pulses:  2+ and symmetric    Skin:  No jaundice, rashes, or lesions    Lymph nodes:  No palpable cervical lymphadenopathy        Lab Results:   No visits with results within 1 Day(s) from this visit  Latest known visit with results is:   Lab on 01/20/2021   Component Date Value    WBC 01/20/2021 6 51     RBC 01/20/2021 5 13*    Hemoglobin 01/20/2021 12 1     Hematocrit 01/20/2021 42 3     MCV 01/20/2021 83     MCH 01/20/2021 23 6*    MCHC 01/20/2021 28 6*    RDW 01/20/2021 20 3*    MPV 01/20/2021 12 5     Platelets 97/18/5047 460*    nRBC 01/20/2021 0     Neutrophils Relative 01/20/2021 42*    Immat GRANS % 01/20/2021 0     Lymphocytes Relative 01/20/2021 44     Monocytes Relative 01/20/2021 10     Eosinophils Relative 01/20/2021 3     Basophils Relative 01/20/2021 1     Neutrophils Absolute 01/20/2021 2 74     Immature Grans Absolute 01/20/2021 0 02     Lymphocytes Absolute 01/20/2021 2 88     Monocytes Absolute 01/20/2021 0 66     Eosinophils Absolute 01/20/2021 0 16     Basophils Absolute 01/20/2021 0 05          Radiology Results:   No results found

## 2021-01-26 ENCOUNTER — TELEPHONE (OUTPATIENT)
Dept: GASTROENTEROLOGY | Facility: CLINIC | Age: 71
End: 2021-01-26

## 2021-01-26 NOTE — TELEPHONE ENCOUNTER
----- Message from Marquise Kinney PA-C sent at 1/26/2021  7:54 AM EST -----  Please inform patient that her calcium level was elevated on this blood work - I want her to follow up with her PCP regarding this  Also, the CRP is elevated (which elevates with active crohn's disease) - I want her to begin the Prednisone taper I called into the pharmacy

## 2021-01-27 DIAGNOSIS — K50.018 CROHN'S DISEASE OF SMALL INTESTINE WITH OTHER COMPLICATION (HCC): ICD-10-CM

## 2021-01-27 RX ORDER — PREDNISONE 1 MG/1
TABLET ORAL
Qty: 147 TABLET | Refills: 0 | Status: SHIPPED | OUTPATIENT
Start: 2021-01-27 | End: 2021-03-01 | Stop reason: SDUPTHER

## 2021-02-09 ENCOUNTER — TELEPHONE (OUTPATIENT)
Dept: GASTROENTEROLOGY | Facility: CLINIC | Age: 71
End: 2021-02-09

## 2021-02-09 NOTE — TELEPHONE ENCOUNTER
----- Message from Adry Guaman sent at 7/3/8130 11:58 AM EST -----  Regarding: meds  Pharmacy stated the patients entyvio for tomorrow's treatment still isn't here do you have any tracking info on the drugs she is due in tomorrow @ [id number]

## 2021-02-09 NOTE — TELEPHONE ENCOUNTER
Called [employer] to follow up due to new insurance there is a high co pay and there is no funds available through [employer], forms are being sent to  for assistance  I will contact patient and infusion center

## 2021-02-10 ENCOUNTER — HOSPITAL ENCOUNTER (OUTPATIENT)
Dept: INFUSION CENTER | Facility: CLINIC | Age: 71
Discharge: HOME | End: 2021-02-10

## 2021-03-01 ENCOUNTER — OFFICE VISIT (OUTPATIENT)
Dept: GASTROENTEROLOGY | Facility: CLINIC | Age: 71
End: 2021-03-01
Payer: COMMERCIAL

## 2021-03-01 ENCOUNTER — APPOINTMENT (OUTPATIENT)
Dept: LAB | Facility: CLINIC | Age: 71
End: 2021-03-01
Payer: COMMERCIAL

## 2021-03-01 VITALS
SYSTOLIC BLOOD PRESSURE: 134 MMHG | HEIGHT: 63 IN | BODY MASS INDEX: 21.44 KG/M2 | HEART RATE: 82 BPM | WEIGHT: 121 LBS | DIASTOLIC BLOOD PRESSURE: 68 MMHG

## 2021-03-01 DIAGNOSIS — K50.018 CROHN'S DISEASE OF SMALL INTESTINE WITH OTHER COMPLICATION (HCC): Primary | ICD-10-CM

## 2021-03-01 DIAGNOSIS — K50.018 CROHN'S DISEASE OF SMALL INTESTINE WITH OTHER COMPLICATION (HCC): ICD-10-CM

## 2021-03-01 DIAGNOSIS — D50.9 IRON DEFICIENCY ANEMIA, UNSPECIFIED IRON DEFICIENCY ANEMIA TYPE: ICD-10-CM

## 2021-03-01 LAB
ALBUMIN SERPL BCP-MCNC: 3 G/DL (ref 3.5–5)
ALP SERPL-CCNC: 82 U/L (ref 46–116)
ALT SERPL W P-5'-P-CCNC: 26 U/L (ref 12–78)
ANION GAP SERPL CALCULATED.3IONS-SCNC: 2 MMOL/L (ref 4–13)
AST SERPL W P-5'-P-CCNC: 14 U/L (ref 5–45)
BASOPHILS # BLD AUTO: 0.04 THOUSANDS/ΜL (ref 0–0.1)
BASOPHILS NFR BLD AUTO: 1 % (ref 0–1)
BILIRUB SERPL-MCNC: 0.58 MG/DL (ref 0.2–1)
BUN SERPL-MCNC: 14 MG/DL (ref 5–25)
CALCIUM ALBUM COR SERPL-MCNC: 10.1 MG/DL (ref 8.3–10.1)
CALCIUM SERPL-MCNC: 9.3 MG/DL (ref 8.3–10.1)
CHLORIDE SERPL-SCNC: 103 MMOL/L (ref 100–108)
CO2 SERPL-SCNC: 32 MMOL/L (ref 21–32)
CREAT SERPL-MCNC: 0.8 MG/DL (ref 0.6–1.3)
CRP SERPL QL: 9.9 MG/L
EOSINOPHIL # BLD AUTO: 0.15 THOUSAND/ΜL (ref 0–0.61)
EOSINOPHIL NFR BLD AUTO: 2 % (ref 0–6)
ERYTHROCYTE [DISTWIDTH] IN BLOOD BY AUTOMATED COUNT: 22.2 % (ref 11.6–15.1)
GFR SERPL CREATININE-BSD FRML MDRD: 88 ML/MIN/1.73SQ M
GLUCOSE P FAST SERPL-MCNC: 85 MG/DL (ref 65–99)
HCT VFR BLD AUTO: 42.5 % (ref 34.8–46.1)
HGB BLD-MCNC: 12.9 G/DL (ref 11.5–15.4)
IMM GRANULOCYTES # BLD AUTO: 0.03 THOUSAND/UL (ref 0–0.2)
IMM GRANULOCYTES NFR BLD AUTO: 1 % (ref 0–2)
LYMPHOCYTES # BLD AUTO: 2.77 THOUSANDS/ΜL (ref 0.6–4.47)
LYMPHOCYTES NFR BLD AUTO: 42 % (ref 14–44)
MCH RBC QN AUTO: 26.3 PG (ref 26.8–34.3)
MCHC RBC AUTO-ENTMCNC: 30.4 G/DL (ref 31.4–37.4)
MCV RBC AUTO: 87 FL (ref 82–98)
MONOCYTES # BLD AUTO: 0.8 THOUSAND/ΜL (ref 0.17–1.22)
MONOCYTES NFR BLD AUTO: 13 % (ref 4–12)
NEUTROPHILS # BLD AUTO: 2.61 THOUSANDS/ΜL (ref 1.85–7.62)
NEUTS SEG NFR BLD AUTO: 41 % (ref 43–75)
NRBC BLD AUTO-RTO: 0 /100 WBCS
PLATELET # BLD AUTO: 365 THOUSANDS/UL (ref 149–390)
PMV BLD AUTO: 10.5 FL (ref 8.9–12.7)
POTASSIUM SERPL-SCNC: 3.7 MMOL/L (ref 3.5–5.3)
PROT SERPL-MCNC: 7.9 G/DL (ref 6.4–8.2)
RBC # BLD AUTO: 4.9 MILLION/UL (ref 3.81–5.12)
SODIUM SERPL-SCNC: 137 MMOL/L (ref 136–145)
WBC # BLD AUTO: 6.4 THOUSAND/UL (ref 4.31–10.16)

## 2021-03-01 PROCEDURE — 86140 C-REACTIVE PROTEIN: CPT

## 2021-03-01 PROCEDURE — 85025 COMPLETE CBC W/AUTO DIFF WBC: CPT

## 2021-03-01 PROCEDURE — 99213 OFFICE O/P EST LOW 20 MIN: CPT | Performed by: PHYSICIAN ASSISTANT

## 2021-03-01 PROCEDURE — 80053 COMPREHEN METABOLIC PANEL: CPT

## 2021-03-01 PROCEDURE — 36415 COLL VENOUS BLD VENIPUNCTURE: CPT

## 2021-03-01 RX ORDER — PREDNISONE 1 MG/1
TABLET ORAL
Qty: 70 TABLET | Refills: 0 | Status: SHIPPED | OUTPATIENT
Start: 2021-03-01 | End: 2021-12-10 | Stop reason: SDUPTHER

## 2021-03-01 NOTE — TELEPHONE ENCOUNTER
entyvio assistance has been trying to call the ptn to get her proof of income for verification  Also they need her medicare and medicaid information   Please call them at [phone] case# [id number]

## 2021-03-01 NOTE — PROGRESS NOTES
[facility] - Outpatient Follow-up Note  Merle Lira 78 y o  female MRN: [id number]  Encounter: [id number]          ASSESSMENT AND PLAN:      1  Crohn's disease of small intestine with other complication ([facility] )  2  Iron deficiency anemia    Patient has a long, complicated history   She originally was diagnosed with UC and underwent a total proctocolectomy with IPAA in 2007  In 2019, she was admitted with a SBO and found to have a stricture at the pouch anal anastomosis and adhesions and underwent dilation of the stricture complicated by microperforation, ex lap, RONA, and creation of loop ileostomy (intraoperatively she was noted to have large linear ulcerations without the pouch and afferent limb of the small bowel)  She then had an ileostomy reversal in 10/2019  Colonoscopy in March of 2020 by her colorectal surgeon Dr Keo Taylor in [facility] showed an anal stricture which was dilated and aphthous ulcerations in the afferent limp, a hemorrhagic appearance and inflammation of the ileum consistent with SB crohn's disease and she was referred here to start biological treatment  Ayanna Bond was also given a Prednisone taper at that time in the spring which temporarily helped her symptoms   She did not follow up here until August of 2020  CT enterography 8/31 showed ileitis and pouchitis with high grade narrowing at the IPA anastomosis  Prebiological testing for Hep B and TB were negative   She had her influenza vaccine  She was started on Entyvio 300mg infusions in September (due to her comorbidities and age)    She received her first Entyvio infusion on 9/23 but then cancelled and no showed for the additional induction infusions and had no treatment for over a month   She was restarted on Entyvio 11/4 and has completed the induction phase  She was due for her latest Entyvio infusion in February but had issues with her insurance and has not received the infusion yet    She reports she was feeling well but has now had a return of her symptoms over the past week or so  Plan to reschedule the Entyvio infusion ASAP  Continue Entyvio 300mg q 8 weeks  Increase Prednisone to 20mg po daily with decrease 5mg q week to bridge  Check CBC, CMP, CRP, c diff, and fecal calprotectin  I am unable to check Entyvio levels at present (plan to check in the future when she has not had interruptions in her treatment)  She will be following up with her colorectal surgeon for repeat colonoscopy  Consider repeat CT enterography at next office visit  Follow up in 2 months   ______________________________________________________________________    SUBJECTIVE:  Patient is a 78year old female who presents to the office for follow up of her crohn's disease  Patient was due for her latest Entyvio infusion in February which she missed as her insurance changed  With her new insurance, her copay is high and we sent forms last month for the  assistance  Proof of income was requested and patient was contacted to provide that information which she did not provide until the office visit today  She reports she was feeling well until about a week ago  She reports up to 6 loose stools a day and some abdominal pain  Minimal blood in the stool  Weight is stable  She is down to 10mg on the Prednisone taper  REVIEW OF SYSTEMS IS OTHERWISE NEGATIVE        Historical Information   Past Medical History:   Diagnosis Date    Asthma     [facility]     [facility]     Diabetes mellitus ([facility] )     Hypertension     Ocular hypertension      Past Surgical History:   Procedure Laterality Date    CATARACT EXTRACTION      COLON SURGERY      Partial colectomy, partial removal of the rectum    EXPLORATORY LAPAROTOMY      ILEOSTOMY       Social History   Social History     Substance and Sexual Activity   Alcohol Use No     Social History     Substance and Sexual Activity   Drug Use No     Social History     Tobacco Use   Smoking Status Never Smoker   Smokeless Tobacco Never Used     Family History   Problem Relation Age of Onset    Diabetes Mother     Hypertension Mother        Meds/Allergies       Current Outpatient Medications:     albuterol (PROVENTIL HFA,VENTOLIN HFA) 90 mcg/act inhaler    Ascorbic Acid (Vitamin C) 500 MG CAPS    Calcium Carb-Cholecalciferol (CALCIUM 600 + D PO)    Cyanocobalamin (B-12) 5000 MCG CAPS    dorzolamide-timolol (COSOPT) 22 3-6 8 MG/ML ophthalmic solution    Entyvio 570 MG SOLR    Folic Acid 20 MG CAPS    hydrochlorothiazide (HYDRODIURIL) 25 mg tablet    levothyroxine 25 mcg tablet    lisinopril (ZESTRIL) 5 mg tablet    MAGNESIUM PO    metFORMIN (GLUCOPHAGE) 500 mg tablet    mometasone (NASONEX) 50 mcg/act nasal spray    mometasone-formoterol (DULERA) 200-5 MCG/ACT inhaler    montelukast (SINGULAIR) 10 mg tablet    Multiple Vitamins-Minerals (OCUTABS-LUTEIN) TABS    Nebulizers (Nebulizer Compressor) MISC    ondansetron (ZOFRAN) 4 mg tablet    potassium chloride (KLOR-CON M20) 20 mEq tablet    predniSONE 5 mg tablet    simethicone (MYLICON) 80 mg chewable tablet    timolol (TIMOPTIC) 0 25 % ophthalmic solution    Zinc 50 MG CAPS    Allergies   Allergen Reactions    Brimonidine      Red eyes    Tramadol Nausea Only           Objective     Blood pressure 134/68, pulse 82, height 5' 3" (1 6 m), weight 54 9 kg (121 lb)  Body mass index is 21 43 kg/m²  PHYSICAL EXAM:      General Appearance:   Alert, cooperative, no distress   HEENT:   Normocephalic, atraumatic, anicteric      Neck:  Supple, symmetrical, trachea midline   Lungs:   Clear to auscultation bilaterally; no rales, rhonchi or wheezing; respirations unlabored    Heart[de-identified]   Regular rate and rhythm; no murmur, rub, or gallop     Abdomen:   Soft, non-tender, non-distended; normal bowel sounds; no masses, no organomegaly    Genitalia:   Deferred    Rectal:   Deferred    Extremities:  No cyanosis, clubbing or edema    Pulses:  2+ and symmetric    Skin:  No jaundice, rashes, or lesions    Lymph nodes:  No palpable cervical lymphadenopathy        Lab Results:   No visits with results within 1 Day(s) from this visit  Latest known visit with results is:   Appointment on 01/25/2021   Component Date Value    CRP 01/25/2021 25 2*    Sodium 01/25/2021 141     Potassium 01/25/2021 3 9     Chloride 01/25/2021 106     CO2 01/25/2021 31     ANION GAP 01/25/2021 4     BUN 01/25/2021 9     Creatinine 01/25/2021 0 84     Glucose, Fasting 01/25/2021 96     Calcium 01/25/2021 10 3*    Corrected Calcium 01/25/2021 11 0*    AST 01/25/2021 15     ALT 01/25/2021 21     Alkaline Phosphatase 01/25/2021 99     Total Protein 01/25/2021 8 4*    Albumin 01/25/2021 3 1*    Total Bilirubin 01/25/2021 0 48     eGFR 01/25/2021 83          Radiology Results:   No results found

## 2021-03-01 NOTE — TELEPHONE ENCOUNTER
Miranda Mishra spoke to Baker at [phone] Case# [id number] proof of income was faxed as well as insurance cards to [phone]

## 2021-03-16 ENCOUNTER — APPOINTMENT (OUTPATIENT)
Dept: LAB | Facility: CLINIC | Age: 71
End: 2021-03-16
Payer: COMMERCIAL

## 2021-03-16 DIAGNOSIS — K50.018 CROHN'S DISEASE OF SMALL INTESTINE WITH OTHER COMPLICATION (HCC): ICD-10-CM

## 2021-03-16 LAB — C DIFF TOX B TCDB STL QL NAA+PROBE: NEGATIVE

## 2021-03-16 PROCEDURE — 87493 C DIFF AMPLIFIED PROBE: CPT

## 2021-03-16 PROCEDURE — 83993 ASSAY FOR CALPROTECTIN FECAL: CPT

## 2021-03-18 LAB — CALPROTECTIN STL-MCNT: 568 UG/G (ref 0–120)

## 2021-03-29 ENCOUNTER — TELEPHONE (OUTPATIENT)
Dept: GASTROENTEROLOGY | Facility: CLINIC | Age: 71
End: 2021-03-29

## 2021-03-29 NOTE — TELEPHONE ENCOUNTER
LMOM to call office in regards to her [facility] application

## 2021-04-20 DIAGNOSIS — K50.018 CROHN'S DISEASE OF SMALL INTESTINE WITH OTHER COMPLICATION (HCC): Primary | ICD-10-CM

## 2021-04-26 DIAGNOSIS — K50.018 CROHN'S DISEASE OF SMALL INTESTINE WITH OTHER COMPLICATION (HCC): Primary | ICD-10-CM

## 2021-04-26 RX ORDER — VEDOLIZUMAB 300 MG/5ML
INJECTION, POWDER, LYOPHILIZED, FOR SOLUTION INTRAVENOUS
Qty: 1 EACH | Refills: 6 | Status: SHIPPED | OUTPATIENT
Start: 2021-04-26 | End: 2021-04-28 | Stop reason: SDUPTHER

## 2021-04-26 RX ORDER — SODIUM CHLORIDE 9 MG/ML
20 INJECTION, SOLUTION INTRAVENOUS ONCE
Status: CANCELLED | OUTPATIENT
Start: 2021-04-30

## 2021-04-26 RX ORDER — DIPHENHYDRAMINE HCL 25 MG
25 TABLET ORAL ONCE
Status: CANCELLED | OUTPATIENT
Start: 2021-04-30

## 2021-04-26 RX ORDER — ACETAMINOPHEN 325 MG/1
650 TABLET ORAL ONCE
Status: CANCELLED | OUTPATIENT
Start: 2021-04-30

## 2021-04-28 DIAGNOSIS — K50.018 CROHN'S DISEASE OF SMALL INTESTINE WITH OTHER COMPLICATION (HCC): ICD-10-CM

## 2021-04-28 RX ORDER — VEDOLIZUMAB 300 MG/5ML
INJECTION, POWDER, LYOPHILIZED, FOR SOLUTION INTRAVENOUS
Qty: 1 EACH | Refills: 6 | Status: SHIPPED | OUTPATIENT
Start: 2021-04-28

## 2021-04-28 RX ORDER — VEDOLIZUMAB 300 MG/5ML
300 INJECTION, POWDER, LYOPHILIZED, FOR SOLUTION INTRAVENOUS
Qty: 1 EACH | Refills: 6 | Status: SHIPPED | OUTPATIENT
Start: 2021-04-28

## 2021-05-04 ENCOUNTER — TELEPHONE (OUTPATIENT)
Dept: GASTROENTEROLOGY | Facility: CLINIC | Age: 71
End: 2021-05-04

## 2021-05-05 NOTE — TELEPHONE ENCOUNTER
Infusion called wanting to know if the pt is still receiving entyvio infusions / they received a call for a delivery of the medication but put it on hold  I informed the infusion center that the medication was just newly approved with josé & that she will continue with the infusions  They wanted to schedule an appt for her but I told them I'll call the pt & found out what days & times are good for her   After she is scheduled the infusion center will then schedule delivery for the medication

## 2021-05-07 NOTE — TELEPHONE ENCOUNTER
Madeleine Soulier left a message with a pharmacy number for her entyvio   The number she left is [phone]

## 2021-05-14 DIAGNOSIS — K50.018 CROHN'S DISEASE OF SMALL INTESTINE WITH OTHER COMPLICATION (HCC): Primary | ICD-10-CM

## 2021-05-14 RX ORDER — DIPHENHYDRAMINE HCL 25 MG
25 TABLET ORAL ONCE
Status: CANCELLED | OUTPATIENT
Start: 2021-05-18

## 2021-05-14 RX ORDER — ACETAMINOPHEN 325 MG/1
650 TABLET ORAL ONCE
Status: CANCELLED | OUTPATIENT
Start: 2021-05-18

## 2021-05-14 RX ORDER — SODIUM CHLORIDE 9 MG/ML
20 INJECTION, SOLUTION INTRAVENOUS ONCE
Status: CANCELLED | OUTPATIENT
Start: 2021-05-18

## 2021-05-18 ENCOUNTER — HOSPITAL ENCOUNTER (OUTPATIENT)
Dept: INFUSION CENTER | Facility: CLINIC | Age: 71
Discharge: HOME | End: 2021-05-18
Payer: COMMERCIAL

## 2021-05-18 VITALS
RESPIRATION RATE: 18 BRPM | SYSTOLIC BLOOD PRESSURE: 113 MMHG | HEART RATE: 72 BPM | TEMPERATURE: 97.5 F | DIASTOLIC BLOOD PRESSURE: 66 MMHG

## 2021-05-18 DIAGNOSIS — K50.018 CROHN'S DISEASE OF SMALL INTESTINE WITH OTHER COMPLICATION (HCC): Primary | ICD-10-CM

## 2021-05-18 PROCEDURE — 96413 CHEMO IV INFUSION 1 HR: CPT

## 2021-05-18 RX ORDER — DIPHENHYDRAMINE HCL 25 MG
25 TABLET ORAL ONCE
Status: CANCELLED | OUTPATIENT
Start: 2021-06-01

## 2021-05-18 RX ORDER — ACETAMINOPHEN 325 MG/1
650 TABLET ORAL ONCE
Status: CANCELLED | OUTPATIENT
Start: 2021-06-01

## 2021-05-18 RX ORDER — ACETAMINOPHEN 325 MG/1
650 TABLET ORAL ONCE
Status: COMPLETED | OUTPATIENT
Start: 2021-05-18 | End: 2021-05-18

## 2021-05-18 RX ORDER — SODIUM CHLORIDE 9 MG/ML
20 INJECTION, SOLUTION INTRAVENOUS ONCE
Status: CANCELLED | OUTPATIENT
Start: 2021-06-01

## 2021-05-18 RX ORDER — SODIUM CHLORIDE 9 MG/ML
20 INJECTION, SOLUTION INTRAVENOUS ONCE
Status: COMPLETED | OUTPATIENT
Start: 2021-05-18 | End: 2021-05-18

## 2021-05-18 RX ORDER — DIPHENHYDRAMINE HCL 25 MG
25 TABLET ORAL ONCE
Status: COMPLETED | OUTPATIENT
Start: 2021-05-18 | End: 2021-05-18

## 2021-05-18 RX ADMIN — SODIUM CHLORIDE 20 ML/HR: 0.9 INJECTION, SOLUTION INTRAVENOUS at 12:56

## 2021-05-18 RX ADMIN — DIPHENHYDRAMINE HYDROCHLORIDE 25 MG: 25 TABLET ORAL at 12:56

## 2021-05-18 RX ADMIN — ACETAMINOPHEN 650 MG: 325 TABLET, FILM COATED ORAL at 12:56

## 2021-05-18 RX ADMIN — VEDOLIZUMAB 300 MG: 300 INJECTION, POWDER, LYOPHILIZED, FOR SOLUTION INTRAVENOUS at 14:03

## 2021-05-18 NOTE — PROGRESS NOTES
Pt here for entyvio, offers no complaints at present time  Left hand IV placed with  Positive blood return noted throughout treatment,  Tolerated infusion without incident  PIV removed  AVS declined  Walked out in stable condition

## 2021-06-03 ENCOUNTER — HOSPITAL ENCOUNTER (OUTPATIENT)
Dept: INFUSION CENTER | Facility: CLINIC | Age: 71
Discharge: HOME | End: 2021-06-03
Payer: COMMERCIAL

## 2021-06-03 DIAGNOSIS — K50.018 CROHN'S DISEASE OF SMALL INTESTINE WITH OTHER COMPLICATION (HCC): Primary | ICD-10-CM

## 2021-06-03 PROCEDURE — 96413 CHEMO IV INFUSION 1 HR: CPT

## 2021-06-03 RX ORDER — DIPHENHYDRAMINE HCL 25 MG
25 TABLET ORAL ONCE
Status: COMPLETED | OUTPATIENT
Start: 2021-06-03 | End: 2021-06-03

## 2021-06-03 RX ORDER — DIPHENHYDRAMINE HCL 25 MG
25 TABLET ORAL ONCE
Status: CANCELLED | OUTPATIENT
Start: 2021-06-15

## 2021-06-03 RX ORDER — ACETAMINOPHEN 325 MG/1
650 TABLET ORAL ONCE
Status: COMPLETED | OUTPATIENT
Start: 2021-06-03 | End: 2021-06-03

## 2021-06-03 RX ORDER — ACETAMINOPHEN 325 MG/1
650 TABLET ORAL ONCE
Status: CANCELLED | OUTPATIENT
Start: 2021-06-15

## 2021-06-03 RX ORDER — SODIUM CHLORIDE 9 MG/ML
20 INJECTION, SOLUTION INTRAVENOUS ONCE
Status: CANCELLED | OUTPATIENT
Start: 2021-06-15

## 2021-06-03 RX ORDER — SODIUM CHLORIDE 9 MG/ML
20 INJECTION, SOLUTION INTRAVENOUS ONCE
Status: COMPLETED | OUTPATIENT
Start: 2021-06-03 | End: 2021-06-03

## 2021-06-03 RX ADMIN — DIPHENHYDRAMINE HYDROCHLORIDE 25 MG: 25 TABLET ORAL at 13:28

## 2021-06-03 RX ADMIN — ACETAMINOPHEN 650 MG: 325 TABLET, FILM COATED ORAL at 13:28

## 2021-06-03 RX ADMIN — VEDOLIZUMAB 300 MG: 300 INJECTION, POWDER, LYOPHILIZED, FOR SOLUTION INTRAVENOUS at 14:05

## 2021-06-03 RX ADMIN — SODIUM CHLORIDE 20 ML/HR: 0.9 INJECTION, SOLUTION INTRAVENOUS at 13:28

## 2021-06-03 NOTE — PROGRESS NOTES
Pt presented for entivyo infusion, offering no complaints  Pt tolerated entire infusion without incident  PIV removed, pt was given AVS and discharged in stable condition

## 2021-06-15 DIAGNOSIS — K50.018 CROHN'S DISEASE OF SMALL INTESTINE WITH OTHER COMPLICATION (HCC): Primary | ICD-10-CM

## 2021-06-15 RX ORDER — DIPHENHYDRAMINE HCL 25 MG
25 TABLET ORAL ONCE
Status: CANCELLED | OUTPATIENT
Start: 2021-06-17

## 2021-06-15 RX ORDER — ACETAMINOPHEN 325 MG/1
650 TABLET ORAL ONCE
Status: CANCELLED | OUTPATIENT
Start: 2021-06-17

## 2021-06-15 RX ORDER — SODIUM CHLORIDE 9 MG/ML
20 INJECTION, SOLUTION INTRAVENOUS ONCE
Status: CANCELLED | OUTPATIENT
Start: 2021-06-17

## 2021-06-17 ENCOUNTER — HOSPITAL ENCOUNTER (OUTPATIENT)
Dept: INFUSION CENTER | Facility: CLINIC | Age: 71
Discharge: HOME | End: 2021-06-17

## 2021-06-29 ENCOUNTER — HOSPITAL ENCOUNTER (OUTPATIENT)
Dept: INFUSION CENTER | Facility: CLINIC | Age: 71
Discharge: HOME | End: 2021-06-29
Payer: COMMERCIAL

## 2021-06-29 VITALS
TEMPERATURE: 97.1 F | HEART RATE: 81 BPM | RESPIRATION RATE: 16 BRPM | DIASTOLIC BLOOD PRESSURE: 83 MMHG | SYSTOLIC BLOOD PRESSURE: 114 MMHG

## 2021-06-29 DIAGNOSIS — K50.018 CROHN'S DISEASE OF SMALL INTESTINE WITH OTHER COMPLICATION (HCC): Primary | ICD-10-CM

## 2021-06-29 PROCEDURE — 96413 CHEMO IV INFUSION 1 HR: CPT

## 2021-06-29 RX ORDER — SODIUM CHLORIDE 9 MG/ML
20 INJECTION, SOLUTION INTRAVENOUS ONCE
Status: COMPLETED | OUTPATIENT
Start: 2021-06-29 | End: 2021-06-29

## 2021-06-29 RX ORDER — ACETAMINOPHEN 325 MG/1
650 TABLET ORAL ONCE
Status: COMPLETED | OUTPATIENT
Start: 2021-06-29 | End: 2021-06-29

## 2021-06-29 RX ORDER — DIPHENHYDRAMINE HCL 25 MG
25 TABLET ORAL ONCE
Status: CANCELLED | OUTPATIENT
Start: 2021-08-24

## 2021-06-29 RX ORDER — DIPHENHYDRAMINE HCL 25 MG
25 TABLET ORAL ONCE
Status: COMPLETED | OUTPATIENT
Start: 2021-06-29 | End: 2021-06-29

## 2021-06-29 RX ORDER — SODIUM CHLORIDE 9 MG/ML
20 INJECTION, SOLUTION INTRAVENOUS ONCE
Status: CANCELLED | OUTPATIENT
Start: 2021-08-24

## 2021-06-29 RX ORDER — ACETAMINOPHEN 325 MG/1
650 TABLET ORAL ONCE
Status: CANCELLED | OUTPATIENT
Start: 2021-08-24

## 2021-06-29 RX ADMIN — ACETAMINOPHEN 650 MG: 325 TABLET, FILM COATED ORAL at 14:27

## 2021-06-29 RX ADMIN — SODIUM CHLORIDE 20 ML/HR: 0.9 INJECTION, SOLUTION INTRAVENOUS at 14:27

## 2021-06-29 RX ADMIN — DIPHENHYDRAMINE HYDROCHLORIDE 25 MG: 25 TABLET ORAL at 14:27

## 2021-06-29 RX ADMIN — VEDOLIZUMAB 300 MG: 300 INJECTION, POWDER, LYOPHILIZED, FOR SOLUTION INTRAVENOUS at 14:59

## 2021-06-29 NOTE — PROGRESS NOTES
Pt presents for entyvio infusion offering no complaints  Infusion tolerated without incident  PIV removed  AVS provided  Pt discharged in stable condition

## 2021-07-20 ENCOUNTER — TELEPHONE (OUTPATIENT)
Dept: HEMATOLOGY ONCOLOGY | Facility: CLINIC | Age: 71
End: 2021-07-20

## 2021-07-20 NOTE — TELEPHONE ENCOUNTER
New Patient Request   Patient Details:     Edith Bolivar      1950      [id number]      Reason for Appointment   Who is calling to schedule? Patient   If not Patient, what is their name? Alexandria Valadez   What is the diagnosis? Anemia    Who is the referring doctor? Dr Soraida Edwards are you scheduling with ? [address] Number to call back on? If calling from the [facility], use the Nurse number   [phone]   Miscellaneous Information:     Please advise the patient, a new patient  will be calling them back within 1 business day

## 2021-07-22 ENCOUNTER — TELEPHONE (OUTPATIENT)
Dept: HEMATOLOGY ONCOLOGY | Facility: CLINIC | Age: 71
End: 2021-07-22

## 2021-07-22 NOTE — TELEPHONE ENCOUNTER
New Patient Encounter    New Patient Intake Form   Patient Details:  Anusha Ponce  1950  [id number]    Background Information:  [address] starts by opening a telephone encounter and gathering the following information   Who is calling to schedule? If not self, relationship to patient? Patient   Referring Provider Dr Keyur Stapleton   What is the diagnosis? Anemia   Is this Cancer or Non-Cancer? Non-Cancer   Is this diagnosis confirmed? Yes   When was the diagnosis? 7/2021   Is there a confirmed diagnosis from a biopsy/tissue reviewed by pathology? NA   Were outside slides requested? NA   Is patient aware of diagnosis? Yes   Is there a personal history and what kind? no   Is there a family history and what kind? No   Reason for visit? New Diagnosis   Have you had any imaging or labs done? If so: when, where? yes  At [address] [facility] records in [facility]? yes   If patient has a prior history of cancer were old records obtained? NA   Was the patient told to bring a disk? No   Does the patient smoke or Vape? No   If yes, how many packs or cartridges per day? n/a   Scheduling Information:   Preferred [place]:  [facility]     Are there any dates/time the patient cannot be seen? Likes 10am on   Miscellaneous: n/a   After completing the above information, please route to Financial Counselor and the appropriate Nurse Navigator for review

## 2021-08-18 ENCOUNTER — APPOINTMENT (OUTPATIENT)
Dept: LAB | Facility: CLINIC | Age: 71
End: 2021-08-18
Payer: COMMERCIAL

## 2021-08-18 DIAGNOSIS — E11.69 TYPE 2 DIABETES MELLITUS WITH OTHER SPECIFIED COMPLICATION, UNSPECIFIED WHETHER LONG TERM INSULIN USE (HCC): ICD-10-CM

## 2021-08-18 LAB
ALBUMIN SERPL BCP-MCNC: 3.2 G/DL (ref 3.5–5)
ALP SERPL-CCNC: 132 U/L (ref 46–116)
ALT SERPL W P-5'-P-CCNC: 34 U/L (ref 12–78)
ANION GAP SERPL CALCULATED.3IONS-SCNC: 4 MMOL/L (ref 4–13)
AST SERPL W P-5'-P-CCNC: 25 U/L (ref 5–45)
BILIRUB SERPL-MCNC: 0.66 MG/DL (ref 0.2–1)
BUN SERPL-MCNC: 17 MG/DL (ref 5–25)
CALCIUM ALBUM COR SERPL-MCNC: 10.4 MG/DL (ref 8.3–10.1)
CALCIUM SERPL-MCNC: 9.8 MG/DL (ref 8.3–10.1)
CHLORIDE SERPL-SCNC: 100 MMOL/L (ref 100–108)
CO2 SERPL-SCNC: 30 MMOL/L (ref 21–32)
CREAT SERPL-MCNC: 0.99 MG/DL (ref 0.6–1.3)
ERYTHROCYTE [DISTWIDTH] IN BLOOD BY AUTOMATED COUNT: 16.2 % (ref 11.6–15.1)
EST. AVERAGE GLUCOSE BLD GHB EST-MCNC: 131 MG/DL
FERRITIN SERPL-MCNC: 30 NG/ML (ref 8–388)
GFR SERPL CREATININE-BSD FRML MDRD: 68 ML/MIN/1.73SQ M
GLUCOSE P FAST SERPL-MCNC: 95 MG/DL (ref 65–99)
HBA1C MFR BLD: 6.2 %
HCT VFR BLD AUTO: 44.5 % (ref 34.8–46.1)
HGB BLD-MCNC: 13.7 G/DL (ref 11.5–15.4)
IRON SERPL-MCNC: 38 UG/DL (ref 50–170)
MCH RBC QN AUTO: 26.9 PG (ref 26.8–34.3)
MCHC RBC AUTO-ENTMCNC: 30.8 G/DL (ref 31.4–37.4)
MCV RBC AUTO: 87 FL (ref 82–98)
PLATELET # BLD AUTO: 449 THOUSANDS/UL (ref 149–390)
PMV BLD AUTO: 12.1 FL (ref 8.9–12.7)
POTASSIUM SERPL-SCNC: 3.9 MMOL/L (ref 3.5–5.3)
PROT SERPL-MCNC: 9.2 G/DL (ref 6.4–8.2)
RBC # BLD AUTO: 5.09 MILLION/UL (ref 3.81–5.12)
SODIUM SERPL-SCNC: 134 MMOL/L (ref 136–145)
WBC # BLD AUTO: 6.15 THOUSAND/UL (ref 4.31–10.16)

## 2021-08-18 PROCEDURE — 36415 COLL VENOUS BLD VENIPUNCTURE: CPT

## 2021-08-18 PROCEDURE — 83036 HEMOGLOBIN GLYCOSYLATED A1C: CPT

## 2021-08-18 PROCEDURE — 80053 COMPREHEN METABOLIC PANEL: CPT

## 2021-08-18 PROCEDURE — 82728 ASSAY OF FERRITIN: CPT

## 2021-08-18 PROCEDURE — 85027 COMPLETE CBC AUTOMATED: CPT

## 2021-08-18 PROCEDURE — 83540 ASSAY OF IRON: CPT

## 2021-08-24 ENCOUNTER — HOSPITAL ENCOUNTER (OUTPATIENT)
Dept: INFUSION CENTER | Facility: CLINIC | Age: 71
Discharge: HOME | End: 2021-08-24
Payer: COMMERCIAL

## 2021-08-24 VITALS
DIASTOLIC BLOOD PRESSURE: 65 MMHG | SYSTOLIC BLOOD PRESSURE: 114 MMHG | RESPIRATION RATE: 18 BRPM | TEMPERATURE: 97.5 F | HEART RATE: 73 BPM

## 2021-08-24 DIAGNOSIS — K50.018 CROHN'S DISEASE OF SMALL INTESTINE WITH OTHER COMPLICATION (HCC): Primary | ICD-10-CM

## 2021-08-24 PROCEDURE — 96413 CHEMO IV INFUSION 1 HR: CPT

## 2021-08-24 RX ORDER — SODIUM CHLORIDE 9 MG/ML
20 INJECTION, SOLUTION INTRAVENOUS ONCE
Status: CANCELLED | OUTPATIENT
Start: 2021-10-19

## 2021-08-24 RX ORDER — DIPHENHYDRAMINE HCL 25 MG
25 TABLET ORAL ONCE
Status: COMPLETED | OUTPATIENT
Start: 2021-08-24 | End: 2021-08-24

## 2021-08-24 RX ORDER — SODIUM CHLORIDE 9 MG/ML
20 INJECTION, SOLUTION INTRAVENOUS ONCE
Status: COMPLETED | OUTPATIENT
Start: 2021-08-24 | End: 2021-08-24

## 2021-08-24 RX ORDER — ACETAMINOPHEN 325 MG/1
650 TABLET ORAL ONCE
Status: CANCELLED | OUTPATIENT
Start: 2021-10-19

## 2021-08-24 RX ORDER — DIPHENHYDRAMINE HCL 25 MG
25 TABLET ORAL ONCE
Status: CANCELLED | OUTPATIENT
Start: 2021-10-19

## 2021-08-24 RX ORDER — ACETAMINOPHEN 325 MG/1
650 TABLET ORAL ONCE
Status: COMPLETED | OUTPATIENT
Start: 2021-08-24 | End: 2021-08-24

## 2021-08-24 RX ADMIN — ACETAMINOPHEN 650 MG: 325 TABLET, FILM COATED ORAL at 14:12

## 2021-08-24 RX ADMIN — DIPHENHYDRAMINE HYDROCHLORIDE 25 MG: 25 TABLET ORAL at 14:12

## 2021-08-24 RX ADMIN — SODIUM CHLORIDE 20 ML/HR: 0.9 INJECTION, SOLUTION INTRAVENOUS at 14:10

## 2021-08-24 RX ADMIN — VEDOLIZUMAB 300 MG: 300 INJECTION, POWDER, LYOPHILIZED, FOR SOLUTION INTRAVENOUS at 14:56

## 2021-08-24 NOTE — PROGRESS NOTES
Pt to clinic for entyvio, offers no complaints today, denies recent illness and abx use, tolerated infusion without complications, aware of next appointment, PIV removed, avs printed and reviewed

## 2021-08-26 NOTE — PROGRESS NOTES
HEMATOLOGY / ONCOLOGY CONSULTATION NOTE    Primary Care Provider: Juhi Monte MD  Referring Provider:    MRN: [id number]  : 1950    Assessment / Plan:     1  Low serum iron  Patient with ferritin of 30, iron 38  Hemoglobin 13 0 7 grams/deciliter  Has iron deficiency due to chronic bleeding from active Crohn's disease  Last colonoscopy was  which was unremarkable per patient  No other relevant history as below in interval history to explain low iron  Prior to scheduling patient for any IV iron we will have her obtain a full iron panel  If this is low, we will consider IV iron at that time  - Iron Panel (Includes Ferritin, Iron Sat%, Iron, and TIBC); Future    2  Crohn's disease of small intestine with other complication ([address] )  - as above  Patient is on Entyvio infusions  She states her Crohn's disease is controlled  She currently takes vitamin B12 orally  I will check this to ensure it is normal     - Vitamin B12; Future    3  Reactive thrombocytosis  - reactive mild thrombocytosis due to chronic inflammation from Crohn's disease and iron deficiency  4  High serum protein level  - on patient's last CMP it was noticed she had a serum protein level of 9 2  Additionally, it was noticed she has elevated alkaline phosphatase and calcium  She also has bone pain  We will obtain an SPEP, immunoglobulin free light chain, bone survey to ensure no plasma neoplasm     - Protein electrophoresis, serum; Future  - Immunoglobulin free LT chains blood; Future  - XR bone survey complete >12 mos; Future     5  Bone pain  - as above  In right shaft of right femur  6  Hypercalcemia  - as above  Will also order PTH     - PTH, intact; Future    I will call patient once her labs are resulted with further management instructions  Otherwise, follow-up in 3 months  Patient voiced understanding and agreement to the above    The patient knows to call the office with any questions or concerns regarding the above  I have spent 60 minutes with Patient  today in which greater than 50% of this time was spent in counseling/coordination of care regarding Diagnostic results, Intructions for management, Patient and family education, Importance of tx compliance, Risk factor reductions and Impressions  Reason for visit:       Chief Complaint   Patient presents with    Consult     anemia        History of Hematology / Oncology Illness:     Mari Prather is a 78 y o  female who came in for consultation  1  History of Iron Deficiency Anemia due to Chronic Blood Loss  - 8/18/2021: Hgb 13 7g/dL, MCV 87  Ferritin 30, iron 38    2  Crohn's Disease  - being treated with Vedolizumab     3  Mild Reactive Thrombocytosis  - since at least 8/2020  - ranges in 400s  - likely reactive due to iron deficiency + inflammation from Crohn's disease    4  High Serum Protein  - total protein 9 2, albumin 3 2  Britney Ca 10 4, alk phos 132    Interval History:   08/27/21: This is a 78year old female with PMH of Crohn's disease, DM, COPD, Asthma, ocular hypertension presenting for consultation  Patient states she has fatigue, PICA for ice, frequent bleeding from the rectum typical for her Crohn's disease  No shortness of breath, no lightheadedness  No constitutional symptoms such as fever, chills, night sweats, lumps, bumps, sudden weight loss  No abdominal pain  Patient does state she has bone pain in her right femur  Not joint related  Patient's last colonoscopy was 2019, she is due for 1 soon  She has had Crohn's disease since the 90s  No menstrual bleeding or bleeding into urine  Diet is well balanced  No other malabsorption of conditions  She currently takes B12  She has never had IV iron before or blood transfusion  She currently takes oral iron  Does not smoke or drink  Retired  No personal history of cancer  No family history of cancer  She is up-to-date on her cancer screenings    Problem list: Patient Active Problem List   Diagnosis    Asthma    [facility]ch[facility]) ([facility] )    Diabetes mellitus ([place] [address] )    Ocular hypertension    Crohn's disease of small intestine with other complication ([place] [address] )       REVIEW OF SYMPTOMS:   Review of Systems   Constitutional: Positive for fatigue  Negative for activity change, appetite change, chills, diaphoresis, fever and unexpected weight change  HENT: Negative for mouth sores and nosebleeds  Eyes: Negative for visual disturbance  Respiratory: Negative for apnea, cough, choking, chest tightness, shortness of breath, wheezing and stridor  Cardiovascular: Negative for chest pain, palpitations and leg swelling  Gastrointestinal: Positive for blood in stool (history of Crohn's disease)  Negative for abdominal pain, anal bleeding, constipation, diarrhea, nausea and vomiting  Endocrine: Negative for cold intolerance  Genitourinary: Negative for hematuria, menstrual problem and vaginal bleeding  Musculoskeletal: Negative for arthralgias  Bone pain   Skin: Negative for color change, pallor and rash  Neurological: Negative for dizziness, syncope, light-headedness and headaches  Hematological: Negative for adenopathy  Does not bruise/bleed easily  Psychiatric/Behavioral: Negative for sleep disturbance  PHYSICAL EXAMINATION:     Vital Signs:   /80 (BP Location: Left arm, Patient Position: Sitting)   Pulse 74   Temp (!) 96 7 °F (35 9 °C) (Tympanic)   Resp 12   Ht 5' 3" (1 6 m)   Wt 52 6 kg (116 lb)   BMI 20 55 kg/m²   Body surface area is 1 53 meters squared  Ht Readings from Last 3 Encounters:   08/27/21 5' 3" (1 6 m)   03/01/21 5' 3" (1 6 m)   01/25/21 5' 3" (1 6 m)       Wt Readings from Last 3 Encounters:   08/27/21 52 6 kg (116 lb)   03/01/21 54 9 kg (121 lb)   01/25/21 53 5 kg (118 lb)          Physical Exam  Constitutional:       General: She is not in acute distress       Appearance: Normal appearance  She is not ill-appearing, toxic-appearing or diaphoretic  HENT:      Head: Normocephalic and atraumatic  Eyes:      General: No scleral icterus  Extraocular Movements: Extraocular movements intact  Conjunctiva/sclera: Conjunctivae normal       Pupils: Pupils are equal, round, and reactive to light  Cardiovascular:      Rate and Rhythm: Normal rate and regular rhythm  Heart sounds: Normal heart sounds  No murmur heard  Pulmonary:      Effort: Pulmonary effort is normal  No respiratory distress  Breath sounds: Normal breath sounds  No stridor  No wheezing, rhonchi or rales  Abdominal:      Palpations: Abdomen is soft  Tenderness: There is no abdominal tenderness  Musculoskeletal:         General: No tenderness  Normal range of motion  Cervical back: Normal range of motion and neck supple  Right lower leg: No edema  Left lower leg: No edema  Lymphadenopathy:      Cervical: No cervical adenopathy  Skin:     General: Skin is warm and dry  Coloration: Skin is not jaundiced or pale  Findings: No bruising, erythema, lesion or rash  Neurological:      General: No focal deficit present  Mental Status: She is alert and oriented to person, place, and time  Mental status is at baseline  Cranial Nerves: No cranial nerve deficit  Motor: No weakness  Psychiatric:         Mood and Affect: Mood normal          Behavior: Behavior normal          Thought Content: Thought content normal          Judgment: Judgment normal        Reviewed historical information          PAST MEDICAL HISTORY:    Past Medical History:   Diagnosis Date    Asthma     COPD [facility]chronic obstructive pulmonary disease) ([facility] )     Crohn's disease ([facility] )     Diabetes mellitus ([facility] )     Hypertension     Ocular hypertension        PAST SURGICAL HISTORY:    Past Surgical History:   Procedure Laterality Date    CATARACT EXTRACTION      COLON SURGERY      Partial colectomy, partial removal of the rectum    EXPLORATORY LAPAROTOMY      ILEOSTOMY           CURRENT MEDICATIONS:     Current Outpatient Medications:     albuterol (PROVENTIL HFA,VENTOLIN HFA) 90 mcg/act inhaler, Inhale, Disp: , Rfl:     Ascorbic Acid (Vitamin C) 500 MG CAPS, Take by mouth, Disp: , Rfl:     Calcium Carb-Cholecalciferol (CALCIUM 600 + D PO), Take by mouth, Disp: , Rfl:     Cyanocobalamin (B-12) 5000 MCG CAPS, Take by mouth, Disp: , Rfl:     dorzolamide-timolol (COSOPT) 22 3-6 8 MG/ML ophthalmic solution, 1 drop 2 times daily- 8 am and 8 pm- both eyes, Disp: , Rfl:     Entyvio SOLR, 300mg IV on week 0, 2, 6 and then every 8 weeks, Disp: 1 each, Rfl: 6    ferrous sulfate 325 (65 FE) MG EC tablet, Take 325 mg by mouth 3 (three) times a day with meals, Disp: , Rfl:     fluticasone (FLONASE) 50 mcg/act nasal spray, , Disp: , Rfl:     hydrochlorothiazide (HYDRODIURIL) 25 mg tablet, TAKE ONE TABLET BY MOUTH EVERY DAY, Disp: , Rfl:     levothyroxine 25 mcg tablet, Take by mouth, Disp: , Rfl:     lisinopril (ZESTRIL) 5 mg tablet, Take 5 mg by mouth daily, Disp: , Rfl:     loperamide (IMODIUM) 2 mg capsule, Take 2 mg by mouth 4 (four) times a day as needed for diarrhea, Disp: , Rfl:     MAGNESIUM PO, Take by mouth, Disp: , Rfl:     metFORMIN (GLUCOPHAGE) 500 mg tablet, Take 500 mg by mouth daily with breakfast, Disp: , Rfl:     mometasone (NASONEX) 50 mcg/act nasal spray, 2 sprays into each nostril, Disp: , Rfl:     mometasone-formoterol (DULERA) 200-5 MCG/ACT inhaler, Inhale, Disp: , Rfl:     montelukast (SINGULAIR) 10 mg tablet, Take by mouth, Disp: , Rfl:     Multiple Vitamins-Minerals (OCUTABS-LUTEIN) TABS, Take by mouth, Disp: , Rfl:     Nebulizers (Nebulizer Compressor) MISC, Inhale, Disp: , Rfl:     ondansetron (ZOFRAN) 4 mg tablet, Take 1 tablet (4 mg total) by mouth every 8 (eight) hours as needed for nausea or vomiting, Disp: 60 tablet, Rfl: 0    potassium chloride (KLOR-CON M20) 20 mEq tablet, Take 20 mEq by mouth, Disp: , Rfl:     predniSONE 5 mg tablet, 4 po daily week 1, 3 po daily week 2, 2 po daily week 3, 1 po daily week 4 , Disp: 70 tablet, Rfl: 0    timolol (TIMOPTIC) 0 25 % ophthalmic solution, , Disp: , Rfl:     vedolizumab (Entyvio) SOLR, Infuse 300 mg into a venous catheter every 56 days, Disp: 1 each, Rfl: 6    Zinc 50 MG CAPS, Take by mouth, Disp: , Rfl:     Folic Acid 20 MG CAPS, Take by mouth (Patient not taking: Reported on 8/27/2021), Disp: , Rfl:     ipratropium (ATROVENT) 0 03 % nasal spray, , Disp: , Rfl:     simethicone (MYLICON) 80 mg chewable tablet, Chew 80 mg (Patient not taking: Reported on 8/27/2021), Disp: , Rfl:     SOCIAL HISTORY:    Social History     Tobacco Use    Smoking status: Never Smoker    Smokeless tobacco: Never Used   Vaping Use    Vaping Use: Never used   Substance Use Topics    Alcohol use: No    Drug use: No       FAMILY HISTORY:    Family History   Problem Relation Age of Onset    Diabetes Mother     Hypertension Mother        ALLERGIES:    Allergies   Allergen Reactions    Brimonidine      Red eyes    Tramadol Nausea Only         LAB:    Lab Results   Component Value Date    WBC 6 15 08/18/2021    HGB 13 7 08/18/2021    HCT 44 5 08/18/2021    MCV 87 08/18/2021     (H) 08/18/2021       Lab Results   Component Value Date    SODIUM 134 (L) 08/18/2021    K 3 9 08/18/2021     08/18/2021    CO2 30 08/18/2021    AGAP 4 08/18/2021    BUN 17 08/18/2021    CREATININE 0 99 08/18/2021    GLUC 90 11/06/2020    GLUF 95 08/18/2021    CALCIUM 9 8 08/18/2021    AST 25 08/18/2021    ALT 34 08/18/2021    ALKPHOS 132 (H) 08/18/2021    TP 9 2 (H) 08/18/2021    TBILI 0 66 08/18/2021    EGFR 68 08/18/2021       IMAGING:  XR chest pa & lateral  Narrative: CHEST     INDICATION:   Abdominal pain  History of Crohn's disease      COMPARISON:  None    EXAM PERFORMED/VIEWS:  XR CHEST PA & LATERAL    FINDINGS:    Cardiomediastinal silhouette appears unremarkable  The lungs are clear  No pneumothorax or pleural effusion  Minimal, age-appropriate thoracic spondylosis  No radiographic sequelae of inflammatory spondyloarthropathy  Impression: No acute cardiopulmonary findings  Workstation performed: [id number]  CT small bowel enterography  Narrative: CT ABDOMEN AND PELVIS WITH IV CONTRAST- ENTEROGRAPHY - WITH CONTRAST    INDICATION: Crohn's disease  Total proctocolectomy with ileal pouch-anal anastomosis in 2007  In 2019, she was admitted with a SBO and found to have a stricture at the pouch anal anastomosis and adhesions and underwent dilation of the stricture   complicated by microperforation, ex lap, RONA, and creation of loop ileostomy  Ileostomy reversal in 10/2019  Recent colonoscopy in March showed an anal stricture with aphthous ulcerations in the afferent limp, a hemorrhagic appearance and inflammation of   the ileum consistent with SB Crohn's disease and she was referred here to start biological treatment  COMPARISON:  CT abdomen and pelvis 8/8/2011  MR abdomen 10/24/2012  TECHNIQUE:  Contrast-enhanced CT examination of the abdomen and pelvis was performed utilizing thin section technique and after the administration of low density enteric contrast according to protocol designed specifically to obtain sensitive evaluation   of the small bowel  Axial, sagittal, and coronal 2D reformatted images were created from the source data and submitted for interpretation  Radiation dose length product (DLP) for this visit:  792 mGy-cm   This examination, like all CT scans performed in the [facility], was performed utilizing techniques to minimize radiation dose exposure, including the use of iterative   reconstruction and automated exposure control  IV Contrast:  100 mL of iohexol (OMNIPAQUE)  Enteric Contrast:  1500 cc of VoLumen enteric contrast was administered      FINDINGS:     BOWEL: Postsurgical changes from total proctocolectomy with ileal pouch-anal anastomosis  There is increased dominantly ileal small bowel dilatation particularly of the ileal pouch with air-fluid level  There is a transition with high-grade narrowing at   the ileal pouch-anal anastomosis (series 204 image 94) suggestive of at least partial obstruction  There is also luminal narrowing, wall thickening and enhancement at the ileal-pouch anastomosis measuring approximately 2 cm (series 201 image 173) in   keeping with stricture  There is wall thickening and submucosal hyperenhancement of the neoterminal ileum and pouch in keeping with active inflammation  No rim-enhancing fluid collection to suggest abscess  No fistula or sinus tract is seen  Small hiatal hernia  REMAINDER OF THE ABDOMEN AND PELVIS:    LOWER CHEST:  Mild right basilar atelectatic changes  LIVER/BILIARY TREE:  No suspicious liver mass  Subcentimeter hypodensity in the right hepatic lobe, too small to characterize  No intrahepatic or extrahepatic biliary ductal dilatation  GALLBLADDER:  Cholelithiasis without pericholecystic inflammatory changes  SPLEEN:  Unremarkable  PANCREAS:  Unremarkable  ADRENAL GLANDS:  Unremarkable  KIDNEYS/URETERS:  Unremarkable  No hydronephrosis  ABDOMINOPELVIC CAVITY:  No pneumoperitoneum  There are prominent to mildly enlarged reactive mesenteric lymph nodes for example measuring 1 5 x 1 1 cm (series 201 image 148)  VESSELS:  Unremarkable for patient's age  PELVIS    REPRODUCTIVE ORGANS: The uterus is present  URINARY BLADDER:  Unremarkable  ABDOMINAL WALL/INGUINAL REGIONS:  Postsurgical changes in the anterior abdominal wall  OSSEOUS STRUCTURES:  No acute fracture or destructive osseous lesion  Degenerative changes of the spine  Impression: Postsurgical changes from prior proctocolectomy with ileal pouch-anal anastomosis    Ileitis and pouchitis with high-grade narrowing at the ileal pouch-anal anastomosis suggestive of at least partial obstruction  Additional area of stricturing more   proximally at the ileal-pouch anastomosis  No findings of penetrating disease  The study was marked in [facility] for immediate notification       Workstation performed: [id number] English

## 2021-08-27 ENCOUNTER — HOSPITAL ENCOUNTER (OUTPATIENT)
Dept: RADIOLOGY | Facility: HOSPITAL | Age: 71
Discharge: HOME | End: 2021-08-27
Payer: COMMERCIAL

## 2021-08-27 ENCOUNTER — APPOINTMENT (OUTPATIENT)
Dept: LAB | Facility: HOSPITAL | Age: 71
End: 2021-08-27
Payer: COMMERCIAL

## 2021-08-27 ENCOUNTER — CONSULT (OUTPATIENT)
Dept: HEMATOLOGY ONCOLOGY | Facility: CLINIC | Age: 71
End: 2021-08-27
Payer: COMMERCIAL

## 2021-08-27 VITALS
TEMPERATURE: 96.7 F | HEART RATE: 74 BPM | WEIGHT: 116 LBS | SYSTOLIC BLOOD PRESSURE: 122 MMHG | BODY MASS INDEX: 20.55 KG/M2 | HEIGHT: 63 IN | DIASTOLIC BLOOD PRESSURE: 80 MMHG | RESPIRATION RATE: 12 BRPM

## 2021-08-27 DIAGNOSIS — E83.52 HYPERCALCEMIA: ICD-10-CM

## 2021-08-27 DIAGNOSIS — M89.8X9 BONE PAIN: ICD-10-CM

## 2021-08-27 DIAGNOSIS — K50.018 CROHN'S DISEASE OF SMALL INTESTINE WITH OTHER COMPLICATION (HCC): ICD-10-CM

## 2021-08-27 DIAGNOSIS — E61.1 LOW SERUM IRON: Primary | ICD-10-CM

## 2021-08-27 DIAGNOSIS — D75.838 REACTIVE THROMBOCYTOSIS: ICD-10-CM

## 2021-08-27 DIAGNOSIS — E61.1 LOW SERUM IRON: ICD-10-CM

## 2021-08-27 DIAGNOSIS — R77.9 HIGH SERUM PROTEIN LEVEL: ICD-10-CM

## 2021-08-27 LAB
FERRITIN SERPL-MCNC: 20 NG/ML (ref 8–388)
IRON SATN MFR SERPL: 12 %
IRON SERPL-MCNC: 55 UG/DL (ref 50–170)
TIBC SERPL-MCNC: 446 UG/DL (ref 250–450)
VIT B12 SERPL-MCNC: 2486 PG/ML (ref 100–900)

## 2021-08-27 PROCEDURE — 83883 ASSAY NEPHELOMETRY NOT SPEC: CPT

## 2021-08-27 PROCEDURE — 84165 PROTEIN E-PHORESIS SERUM: CPT | Performed by: PATHOLOGY

## 2021-08-27 PROCEDURE — 83550 IRON BINDING TEST: CPT

## 2021-08-27 PROCEDURE — 86334 IMMUNOFIX E-PHORESIS SERUM: CPT

## 2021-08-27 PROCEDURE — 84165 PROTEIN E-PHORESIS SERUM: CPT

## 2021-08-27 PROCEDURE — 77075 RADEX OSSEOUS SURVEY COMPL: CPT

## 2021-08-27 PROCEDURE — 82607 VITAMIN B-12: CPT

## 2021-08-27 PROCEDURE — 82728 ASSAY OF FERRITIN: CPT

## 2021-08-27 PROCEDURE — 36415 COLL VENOUS BLD VENIPUNCTURE: CPT

## 2021-08-27 PROCEDURE — 83970 ASSAY OF PARATHORMONE: CPT

## 2021-08-27 PROCEDURE — 86334 IMMUNOFIX E-PHORESIS SERUM: CPT | Performed by: PATHOLOGY

## 2021-08-27 PROCEDURE — 83540 ASSAY OF IRON: CPT

## 2021-08-27 PROCEDURE — 99204 OFFICE O/P NEW MOD 45 MIN: CPT | Performed by: INTERNAL MEDICINE

## 2021-08-27 RX ORDER — LOPERAMIDE HYDROCHLORIDE 2 MG/1
2 CAPSULE ORAL 4 TIMES DAILY PRN
COMMUNITY

## 2021-08-27 RX ORDER — FLUTICASONE PROPIONATE 50 MCG
SPRAY, SUSPENSION (ML) NASAL
COMMUNITY
Start: 2021-05-26

## 2021-08-27 RX ORDER — IPRATROPIUM BROMIDE 21 UG/1
SPRAY, METERED NASAL
COMMUNITY
Start: 2021-08-26

## 2021-08-27 RX ORDER — LANOLIN ALCOHOL/MO/W.PET/CERES
325 CREAM (GRAM) TOPICAL
COMMUNITY

## 2021-08-28 LAB
KAPPA LC FREE SER-MCNC: 31.1 MG/L (ref 3.3–19.4)
KAPPA LC FREE/LAMBDA FREE SER: 0.78 {RATIO} (ref 0.26–1.65)
LAMBDA LC FREE SERPL-MCNC: 39.9 MG/L (ref 5.7–26.3)

## 2021-08-29 LAB — PTH-INTACT SERPL-MCNC: 47.1 PG/ML (ref 18.4–80.1)

## 2021-08-30 LAB
ALBUMIN SERPL ELPH-MCNC: 3.59 G/DL (ref 3.5–5)
ALBUMIN SERPL ELPH-MCNC: 45.4 % (ref 52–65)
ALPHA1 GLOB SERPL ELPH-MCNC: 0.39 G/DL (ref 0.1–0.4)
ALPHA1 GLOB SERPL ELPH-MCNC: 4.9 % (ref 2.5–5)
ALPHA2 GLOB SERPL ELPH-MCNC: 1.07 G/DL (ref 0.4–1.2)
ALPHA2 GLOB SERPL ELPH-MCNC: 13.6 % (ref 7–13)
BETA GLOB ABNORMAL SERPL ELPH-MCNC: 0.49 G/DL (ref 0.4–0.8)
BETA1 GLOB SERPL ELPH-MCNC: 6.2 % (ref 5–13)
BETA2 GLOB SERPL ELPH-MCNC: 3.7 % (ref 2–8)
BETA2+GAMMA GLOB SERPL ELPH-MCNC: 0.29 G/DL (ref 0.2–0.5)
GAMMA GLOB ABNORMAL SERPL ELPH-MCNC: 2.07 G/DL (ref 0.5–1.6)
GAMMA GLOB SERPL ELPH-MCNC: 26.2 % (ref 12–22)
IGG/ALB SER: 0.83 {RATIO} (ref 1.1–1.8)
INTERPRETATION UR IFE-IMP: NORMAL
M PROTEIN 1 MFR SERPL ELPH: 13 %
M PROTEIN 1 SERPL ELPH-MCNC: 1.03 G/DL
PROT PATTERN SERPL ELPH-IMP: ABNORMAL
PROT SERPL-MCNC: 7.9 G/DL (ref 6.4–8.2)

## 2021-09-02 ENCOUNTER — TELEPHONE (OUTPATIENT)
Dept: HEMATOLOGY ONCOLOGY | Facility: CLINIC | Age: 71
End: 2021-09-02

## 2021-09-02 NOTE — TELEPHONE ENCOUNTER
Spoke to patient to let her know that a f/u appt has been scheduled for her with Jarrett Raygoza PA-C on 9/17 @ 9am  Patient aware and okay with this appt  She will call back if she is unable to obtain transportation

## 2021-09-17 ENCOUNTER — OFFICE VISIT (OUTPATIENT)
Dept: HEMATOLOGY ONCOLOGY | Facility: CLINIC | Age: 71
End: 2021-09-17
Payer: COMMERCIAL

## 2021-09-17 VITALS
HEIGHT: 63 IN | TEMPERATURE: 95.9 F | HEART RATE: 68 BPM | RESPIRATION RATE: 14 BRPM | BODY MASS INDEX: 20.82 KG/M2 | WEIGHT: 117.5 LBS | SYSTOLIC BLOOD PRESSURE: 108 MMHG | DIASTOLIC BLOOD PRESSURE: 80 MMHG

## 2021-09-17 DIAGNOSIS — E83.52 HYPERCALCEMIA: ICD-10-CM

## 2021-09-17 DIAGNOSIS — R74.8 ELEVATED ALKALINE PHOSPHATASE LEVEL: ICD-10-CM

## 2021-09-17 DIAGNOSIS — R77.9 HIGH SERUM PROTEIN LEVEL: ICD-10-CM

## 2021-09-17 DIAGNOSIS — E61.1 LOW IRON: ICD-10-CM

## 2021-09-17 DIAGNOSIS — D47.2 MONOCLONAL GAMMOPATHY: Primary | ICD-10-CM

## 2021-09-17 DIAGNOSIS — K50.018 CROHN'S DISEASE OF SMALL INTESTINE WITH OTHER COMPLICATION (HCC): ICD-10-CM

## 2021-09-17 PROCEDURE — 99214 OFFICE O/P EST MOD 30 MIN: CPT | Performed by: INTERNAL MEDICINE

## 2021-09-17 RX ORDER — FAMOTIDINE 40 MG/1
TABLET, FILM COATED ORAL
COMMUNITY
Start: 2021-08-26

## 2021-09-17 RX ORDER — SODIUM CHLORIDE 9 MG/ML
20 INJECTION, SOLUTION INTRAVENOUS ONCE
Status: CANCELLED | OUTPATIENT
Start: 2021-09-20

## 2021-09-17 RX ORDER — OMEPRAZOLE 20 MG/1
CAPSULE, DELAYED RELEASE ORAL
COMMUNITY
Start: 2021-08-27

## 2021-09-17 NOTE — PROGRESS NOTES
HEMATOLOGY CLINIC NOTE    Primary Care Provider: Samantha Donahue MD  Referring Provider:    MRN: [id number]  : 1950    Assessment / Plan:     1  Monoclonal gammopathy  Patient's M spike came back at 1 03 IgG lambda  Kappa/lambda ratio 0 78  Patient has no anemia  She does have elevated calcium at 10 4, alk-phos 132, kappa/lambda ratio 0 78  Total serum protein 9 2  Bone survey showed no lytic lesions  We will proceed with a bone marrow biopsy to rule out a plasma neoplasm  I discussed with patient what this entails  She will see Dr Rafaela Coleman following bone marrow biopsy  - Ambulatory referral to Interventional Radiology; Future    2  Hypercalcemia  As above  PTH within normal limits  3  Elevated alkaline phosphatase level  As above  4  High serum protein level  As above  5  Low iron  Patient with chronic blood loss due to history of Crohn's disease  Hemoglobin is normal  Ferritin 20, iron 55, TIBC 446, iron sat 12%  She cannot tolerate oral iron at this time  We will schedule patient for Venofer 300 mg x 2  Repeat CBC, iron panel in 3 months       - CBC and differential; Future  - Iron Panel (Includes Ferritin, Iron Sat%, Iron, and TIBC); Future    Patient education provided regarding IV iron  Side effects include but are not limited to the following: hypotension, muscle aches, headache, chills, diarrhea, nausea, lightheadedness, rashes, and very rarely anaphylactic reaction  Patient understands & would still like to proceed with treatment  6  Crohn's disease of small intestine with other complication ([facility] )  As above  Patient voiced understanding and agreement to the above  The patient knows to call the office with any questions or concerns regarding the above      I have spent 30 minutes with Patient  today in which greater than 50% of this time was spent in counseling/coordination of care regarding Diagnostic results, Risks and benefits of tx options, Intructions for management, Patient and family education, Importance of tx compliance, Risk factor reductions and Impressions  Reason for visit:       Chief Complaint   Patient presents with    Follow-up       History of Hematology Illness:     Cali Bradshaw is a 78 y o  female who came in for follow up  1  History of Iron Deficiency Anemia   - due to Chronic Blood Loss from Crohn's  - 8/18/2021: Hgb 13 7g/dL, MCV 87    -8/27/2021: Ferritin 20, iron 55, TIBC 446, iron sat 12%  - 2019: last colonoscopy unremarkable per pt    2  Monoclonal Gammopathy -IgG Lambda  - 8/2021 Diagnosed  M spike 1 03  K/L ratio 0 78  - SPEP ordered because of high total protein at 9 2, albumin 3 2   - corrected calcium 10 4, alk phos 132  PTH WNL  - bone survey negative for lytic lesions      2  Crohn's Disease  - being treated with Vedolizumab      3  Mild Reactive Thrombocytosis  - since at least 8/2020  - ranges in 400s  - likely reactive due to iron deficiency + inflammation from Crohn's disease     4  High Serum Protein  - total protein 9 2, albumin 3 2  Britney Ca 10 4, alk phos 132     Interval History:   08/27/21: This is a 78year old female with PMH of Crohn's disease, DM, COPD, Asthma, ocular hypertension presenting for consultation       Patient states she has fatigue, PICA for ice, frequent bleeding from the rectum typical for her Crohn's disease  No shortness of breath, no lightheadedness  No constitutional symptoms such as fever, chills, night sweats, lumps, bumps, sudden weight loss  No abdominal pain  Patient does state she has bone pain in her right femur  Not joint related      Patient's last colonoscopy was 2019, she is due for 1 soon  She has had Crohn's disease since the 90s  No menstrual bleeding or bleeding into urine  Diet is well balanced  No other malabsorption of conditions  She currently takes B12       She has never had IV iron before or blood transfusion  She currently takes oral iron    Does not smoke or drink  Retired  No personal history of cancer  No family history of cancer  She is up-to-date on her cancer screenings  9/17/2021:  Patient came in for follow-up  Denies any complaints  She does not have any constitutional symptoms at this time  Still has some lingering bone pain  Problem list:       Patient Active Problem List   Diagnosis    Asthma    [facility]ch[facility] disease) ([facility] )    Diabetes mellitus ([place] [address] )    Ocular hypertension    Crohn's disease of small intestine with other complication ([place] [address] )    Low iron       REVIEW OF SYMPTOMS:   Review of Systems   Constitutional: Negative for activity change, appetite change, chills, diaphoresis, fatigue, fever and unexpected weight change  HENT: Negative for nosebleeds  Eyes: Negative for visual disturbance  Respiratory: Negative for apnea, cough, choking, chest tightness, shortness of breath, wheezing and stridor  Cardiovascular: Negative for chest pain, palpitations and leg swelling  Gastrointestinal: Negative for abdominal pain, anal bleeding, blood in stool, constipation, diarrhea, nausea and vomiting  Endocrine: Negative for cold intolerance  Genitourinary: Negative for hematuria, menstrual problem and vaginal bleeding  Musculoskeletal: Negative for arthralgias  Bone pain   Skin: Negative for color change, pallor, rash and wound  Neurological: Negative for dizziness, syncope, light-headedness and headaches  Hematological: Negative for adenopathy  Does not bruise/bleed easily  Psychiatric/Behavioral: Negative for sleep disturbance  PHYSICAL EXAMINATION:     Vital Signs:   /80 (BP Location: Left arm, Patient Position: Sitting)   Pulse 68   Temp (!) 95 9 °F (35 5 °C) (Tympanic)   Resp 14   Ht 5' 3" (1 6 m)   Wt 53 3 kg (117 lb 8 oz)   BMI 20 81 kg/m²   Body surface area is 1 54 meters squared     Ht Readings from Last 8 Encounters:   09/17/21 5' 3" (1 6 m)   08/27/21 5' 3" (1 6 m) 03/01/21 5' 3" (1 6 m)   01/25/21 5' 3" (1 6 m)   11/16/20 5' 1" (1 549 m)   09/28/20 5' 1" (1 549 m)   08/24/20 5' (1 524 m)   08/17/20 5' 2 5" (1 588 m)       Wt Readings from Last 8 Encounters:   09/17/21 53 3 kg (117 lb 8 oz)   08/27/21 52 6 kg (116 lb)   03/01/21 54 9 kg (121 lb)   01/25/21 53 5 kg (118 lb)   11/16/20 51 3 kg (113 lb)   09/28/20 51 3 kg (113 lb)   09/23/20 52 6 kg (115 lb 15 4 oz)   08/24/20 51 7 kg (114 lb)          Physical Exam  Constitutional:       General: She is not in acute distress  Appearance: Normal appearance  She is not ill-appearing, toxic-appearing or diaphoretic  HENT:      Head: Normocephalic and atraumatic  Eyes:      General: No scleral icterus  Extraocular Movements: Extraocular movements intact  Conjunctiva/sclera: Conjunctivae normal       Pupils: Pupils are equal, round, and reactive to light  Cardiovascular:      Rate and Rhythm: Normal rate and regular rhythm  Heart sounds: Normal heart sounds  Pulmonary:      Effort: Pulmonary effort is normal       Breath sounds: Normal breath sounds  Abdominal:      Palpations: Abdomen is soft  Tenderness: There is no abdominal tenderness  Musculoskeletal:         General: No tenderness  Normal range of motion  Cervical back: Normal range of motion and neck supple  Right lower leg: No edema  Left lower leg: No edema  Lymphadenopathy:      Cervical: No cervical adenopathy  Skin:     General: Skin is warm and dry  Coloration: Skin is not jaundiced or pale  Findings: No bruising, erythema, lesion or rash  Neurological:      General: No focal deficit present  Mental Status: She is alert and oriented to person, place, and time  Mental status is at baseline  Cranial Nerves: No cranial nerve deficit  Motor: No weakness  Psychiatric:         Mood and Affect: Mood normal          Behavior: Behavior normal          Thought Content:  Thought content normal  Judgment: Judgment normal        Reviewed historical information        PAST MEDICAL HISTORY:    Past Medical History:   Diagnosis Date    Asthma     [facility] )     Crohn's disease ([facility] )     Diabetes mellitus ([facility] )     Hypertension     Ocular hypertension        PAST SURGICAL HISTORY:    Past Surgical History:   Procedure Laterality Date    CATARACT EXTRACTION      COLON SURGERY      Partial colectomy, partial removal of the rectum    EXPLORATORY LAPAROTOMY      ILEOSTOMY           CURRENT MEDICATIONS:     Current Outpatient Medications:     albuterol (PROVENTIL HFA,VENTOLIN HFA) 90 mcg/act inhaler, Inhale, Disp: , Rfl:     Ascorbic Acid (Vitamin C) 500 MG CAPS, Take by mouth, Disp: , Rfl:     Calcium Carb-Cholecalciferol (CALCIUM 600 + D PO), Take by mouth, Disp: , Rfl:     Cyanocobalamin (B-12) 5000 MCG CAPS, Take by mouth, Disp: , Rfl:     dorzolamide-timolol (COSOPT) 22 3-6 8 MG/ML ophthalmic solution, 1 drop 2 times daily- 8 am and 8 pm- both eyes, Disp: , Rfl:     Entyvio SOLR, 300mg IV on week 0, 2, 6 and then every 8 weeks, Disp: 1 each, Rfl: 6    famotidine (PEPCID) 40 MG tablet, , Disp: , Rfl:     ferrous sulfate 325 (65 FE) MG EC tablet, Take 325 mg by mouth 3 (three) times a day with meals, Disp: , Rfl:     hydrochlorothiazide (HYDRODIURIL) 25 mg tablet, TAKE ONE TABLET BY MOUTH EVERY DAY, Disp: , Rfl:     levothyroxine 25 mcg tablet, Take by mouth, Disp: , Rfl:     lisinopril (ZESTRIL) 5 mg tablet, Take 5 mg by mouth daily, Disp: , Rfl:     loperamide (IMODIUM) 2 mg capsule, Take 2 mg by mouth 4 (four) times a day as needed for diarrhea, Disp: , Rfl:     MAGNESIUM PO, Take by mouth, Disp: , Rfl:     metFORMIN (GLUCOPHAGE) 500 mg tablet, Take 500 mg by mouth daily with breakfast, Disp: , Rfl:     mometasone (NASONEX) 50 mcg/act nasal spray, 2 sprays into each nostril, Disp: , Rfl:     mometasone-formoterol (DULERA) 200-5 MCG/ACT inhaler, Inhale, Disp: , Rfl:     montelukast (SINGULAIR) 10 mg tablet, Take by mouth, Disp: , Rfl:     Multiple Vitamins-Minerals (OCUTABS-LUTEIN) TABS, Take by mouth, Disp: , Rfl:     Nebulizers (Nebulizer Compressor) MISC, Inhale, Disp: , Rfl:     omeprazole (PriLOSEC) 20 mg delayed release capsule, , Disp: , Rfl:     ondansetron (ZOFRAN) 4 mg tablet, Take 1 tablet (4 mg total) by mouth every 8 (eight) hours as needed for nausea or vomiting, Disp: 60 tablet, Rfl: 0    potassium chloride (KLOR-CON M20) 20 mEq tablet, Take 20 mEq by mouth, Disp: , Rfl:     timolol (TIMOPTIC) 0 25 % ophthalmic solution, , Disp: , Rfl:     vedolizumab (Entyvio) SOLR, Infuse 300 mg into a venous catheter every 56 days, Disp: 1 each, Rfl: 6    Zinc 50 MG CAPS, Take by mouth, Disp: , Rfl:     fluticasone (FLONASE) 50 mcg/act nasal spray, , Disp: , Rfl:     Folic Acid 20 MG CAPS, Take by mouth (Patient not taking: Reported on 8/27/2021), Disp: , Rfl:     ipratropium (ATROVENT) 0 03 % nasal spray, , Disp: , Rfl:     predniSONE 5 mg tablet, 4 po daily week 1, 3 po daily week 2, 2 po daily week 3, 1 po daily week 4   (Patient not taking: Reported on 9/17/2021), Disp: 70 tablet, Rfl: 0    simethicone (MYLICON) 80 mg chewable tablet, Chew 80 mg (Patient not taking: Reported on 8/27/2021), Disp: , Rfl:     SOCIAL HISTORY:    Social History     Tobacco Use    Smoking status: Never Smoker    Smokeless tobacco: Never Used   Vaping Use    Vaping Use: Never used   Substance Use Topics    Alcohol use: No    Drug use: No       FAMILY HISTORY:    Family History   Problem Relation Age of Onset    Diabetes Mother     Hypertension Mother        ALLERGIES:    Allergies   Allergen Reactions    Brimonidine      Red eyes    Tramadol Nausea Only         LAB:    Lab Results   Component Value Date    WBC 6 15 08/18/2021    HGB 13 7 08/18/2021    HCT 44 5 08/18/2021    MCV 87 08/18/2021     (H) 08/18/2021       Lab Results Component Value Date    SODIUM 134 (L) 08/18/2021    K 3 9 08/18/2021     08/18/2021    CO2 30 08/18/2021    AGAP 4 08/18/2021    BUN 17 08/18/2021    CREATININE 0 99 08/18/2021    GLUC 90 11/06/2020    GLUF 95 08/18/2021    CALCIUM 9 8 08/18/2021    AST 25 08/18/2021    ALT 34 08/18/2021    ALKPHOS 132 (H) 08/18/2021    TP 7 9 08/27/2021    TBILI 0 66 08/18/2021    EGFR 68 08/18/2021       IMAGING:  XR bone survey complete >12 mos  Narrative: BONE SURVEY    INDICATION:   Elevated serum protein level  Skeletal pain  Hypercalcemia       COMPARISON:  None    VIEWS:  XR BONE SURVEY COMPLETE >12 MOS     FINDINGS:    SKULL (LATERAL VIEW):  Unremarkable  CERVICAL SPINE (LATERAL VIEW):    Degenerative disc disease at C5-C6  No compression fracture or lytic process    HUMERI (AP VIEW):   Left shoulder calcific tendinitis  No lytic lesions are seen    THORACIC SPINE (AP LATERAL VIEWS):   Mild discogenic degenerative change  No compression fracture    RIBS:  Unremarkable  LUMBAR SPINE (AP LATERAL VIEWS):   Degenerative disc disease at L4-L5  No compression fracture    PELVIS:   Mild hip osteoarthritis  No acute osseous abnormality    FEMORA (AP VIEW):    Mild bilateral knee arthritis  No lytic lesions    TIBIA-FIBULA (AP VIEW):   Unremarkable  Impression: No destructive osseous lesions   No vertebral compression fractures    Workstation performed: [id number]

## 2021-09-22 DIAGNOSIS — E61.1 LOW IRON: Primary | ICD-10-CM

## 2021-09-22 DIAGNOSIS — K50.018 CROHN'S DISEASE OF SMALL INTESTINE WITH OTHER COMPLICATION (HCC): ICD-10-CM

## 2021-09-22 RX ORDER — SODIUM CHLORIDE 9 MG/ML
20 INJECTION, SOLUTION INTRAVENOUS ONCE
Status: CANCELLED | OUTPATIENT
Start: 2021-09-24

## 2021-09-24 ENCOUNTER — HOSPITAL ENCOUNTER (OUTPATIENT)
Dept: INFUSION CENTER | Facility: CLINIC | Age: 71
Discharge: HOME | End: 2021-09-24
Payer: COMMERCIAL

## 2021-09-24 VITALS
SYSTOLIC BLOOD PRESSURE: 130 MMHG | DIASTOLIC BLOOD PRESSURE: 71 MMHG | RESPIRATION RATE: 16 BRPM | TEMPERATURE: 96.6 F | HEART RATE: 85 BPM

## 2021-09-24 DIAGNOSIS — K50.018 CROHN'S DISEASE OF SMALL INTESTINE WITH OTHER COMPLICATION (HCC): ICD-10-CM

## 2021-09-24 DIAGNOSIS — E61.1 LOW IRON: Primary | ICD-10-CM

## 2021-09-24 PROCEDURE — 96366 THER/PROPH/DIAG IV INF ADDON: CPT

## 2021-09-24 PROCEDURE — 96365 THER/PROPH/DIAG IV INF INIT: CPT

## 2021-09-24 RX ORDER — SODIUM CHLORIDE 9 MG/ML
20 INJECTION, SOLUTION INTRAVENOUS ONCE
Status: COMPLETED | OUTPATIENT
Start: 2021-09-24 | End: 2021-09-24

## 2021-09-24 RX ORDER — SODIUM CHLORIDE 9 MG/ML
20 INJECTION, SOLUTION INTRAVENOUS ONCE
Status: CANCELLED | OUTPATIENT
Start: 2021-10-01

## 2021-09-24 RX ADMIN — IRON SUCROSE 300 MG: 20 INJECTION, SOLUTION INTRAVENOUS at 12:23

## 2021-09-24 RX ADMIN — SODIUM CHLORIDE 20 ML/HR: 9 INJECTION, SOLUTION INTRAVENOUS at 12:22

## 2021-09-24 NOTE — PROGRESS NOTES
Pt here venofer, offering no complaints  Left hand IV placed with positive blood return noted  Tolerated infusion without incident  PIV removed  AVS given  Walked out in stable condition

## 2021-10-01 ENCOUNTER — HOSPITAL ENCOUNTER (OUTPATIENT)
Dept: INFUSION CENTER | Facility: CLINIC | Age: 71
Discharge: HOME | End: 2021-10-01
Payer: COMMERCIAL

## 2021-10-01 VITALS
TEMPERATURE: 97.7 F | DIASTOLIC BLOOD PRESSURE: 69 MMHG | HEART RATE: 100 BPM | RESPIRATION RATE: 20 BRPM | SYSTOLIC BLOOD PRESSURE: 130 MMHG

## 2021-10-01 DIAGNOSIS — E61.1 LOW IRON: Primary | ICD-10-CM

## 2021-10-01 DIAGNOSIS — K50.018 CROHN'S DISEASE OF SMALL INTESTINE WITH OTHER COMPLICATION (HCC): ICD-10-CM

## 2021-10-01 PROCEDURE — 96365 THER/PROPH/DIAG IV INF INIT: CPT

## 2021-10-01 RX ORDER — SODIUM CHLORIDE 9 MG/ML
20 INJECTION, SOLUTION INTRAVENOUS ONCE
Status: CANCELLED | OUTPATIENT
Start: 2021-10-01

## 2021-10-01 RX ORDER — SODIUM CHLORIDE 9 MG/ML
20 INJECTION, SOLUTION INTRAVENOUS ONCE
Status: COMPLETED | OUTPATIENT
Start: 2021-10-01 | End: 2021-10-01

## 2021-10-01 RX ADMIN — IRON SUCROSE 300 MG: 20 INJECTION, SOLUTION INTRAVENOUS at 12:04

## 2021-10-01 RX ADMIN — SODIUM CHLORIDE 20 ML/HR: 9 INJECTION, SOLUTION INTRAVENOUS at 12:04

## 2021-10-13 ENCOUNTER — HOSPITAL ENCOUNTER (OUTPATIENT)
Dept: CT IMAGING | Facility: HOSPITAL | Age: 71
Discharge: HOME | End: 2021-10-13
Admitting: RADIOLOGY
Payer: COMMERCIAL

## 2021-10-13 VITALS
HEART RATE: 76 BPM | TEMPERATURE: 97 F | OXYGEN SATURATION: 97 % | RESPIRATION RATE: 21 BRPM | HEIGHT: 63 IN | DIASTOLIC BLOOD PRESSURE: 69 MMHG | BODY MASS INDEX: 19.84 KG/M2 | WEIGHT: 112 LBS | SYSTOLIC BLOOD PRESSURE: 120 MMHG

## 2021-10-13 DIAGNOSIS — E83.52 HYPERCALCEMIA: ICD-10-CM

## 2021-10-13 DIAGNOSIS — R77.9 HIGH SERUM PROTEIN LEVEL: ICD-10-CM

## 2021-10-13 DIAGNOSIS — R74.8 ELEVATED ALKALINE PHOSPHATASE LEVEL: ICD-10-CM

## 2021-10-13 DIAGNOSIS — D47.2 MONOCLONAL GAMMOPATHY: ICD-10-CM

## 2021-10-13 LAB — GLUCOSE SERPL-MCNC: 123 MG/DL (ref 65–140)

## 2021-10-13 PROCEDURE — 77012 CT SCAN FOR NEEDLE BIOPSY: CPT | Performed by: RADIOLOGY

## 2021-10-13 PROCEDURE — 88341 IMHCHEM/IMCYTCHM EA ADD ANTB: CPT | Performed by: PATHOLOGY

## 2021-10-13 PROCEDURE — 88311 DECALCIFY TISSUE: CPT | Performed by: PATHOLOGY

## 2021-10-13 PROCEDURE — 85097 BONE MARROW INTERPRETATION: CPT | Performed by: PATHOLOGY

## 2021-10-13 PROCEDURE — 88313 SPECIAL STAINS GROUP 2: CPT | Performed by: PATHOLOGY

## 2021-10-13 PROCEDURE — 88364 INSITU HYBRIDIZATION (FISH): CPT | Performed by: PATHOLOGY

## 2021-10-13 PROCEDURE — 88360 TUMOR IMMUNOHISTOCHEM/MANUAL: CPT | Performed by: PATHOLOGY

## 2021-10-13 PROCEDURE — 38222 DX BONE MARROW BX & ASPIR: CPT

## 2021-10-13 PROCEDURE — 82948 REAGENT STRIP/BLOOD GLUCOSE: CPT

## 2021-10-13 PROCEDURE — 88342 IMHCHEM/IMCYTCHM 1ST ANTB: CPT | Performed by: PATHOLOGY

## 2021-10-13 PROCEDURE — 88237 TISSUE CULTURE BONE MARROW: CPT | Performed by: INTERNAL MEDICINE

## 2021-10-13 PROCEDURE — 88305 TISSUE EXAM BY PATHOLOGIST: CPT | Performed by: PATHOLOGY

## 2021-10-13 PROCEDURE — 99152 MOD SED SAME PHYS/QHP 5/>YRS: CPT

## 2021-10-13 PROCEDURE — 38222 DX BONE MARROW BX & ASPIR: CPT | Performed by: RADIOLOGY

## 2021-10-13 PROCEDURE — 88365 INSITU HYBRIDIZATION (FISH): CPT | Performed by: PATHOLOGY

## 2021-10-13 PROCEDURE — 99152 MOD SED SAME PHYS/QHP 5/>YRS: CPT | Performed by: RADIOLOGY

## 2021-10-13 PROCEDURE — 88184 FLOWCYTOMETRY/ TC 1 MARKER: CPT | Performed by: INTERNAL MEDICINE

## 2021-10-13 PROCEDURE — 88185 FLOWCYTOMETRY/TC ADD-ON: CPT

## 2021-10-13 PROCEDURE — 88262 CHROMOSOME ANALYSIS 15-20: CPT | Performed by: INTERNAL MEDICINE

## 2021-10-13 RX ORDER — MIDAZOLAM HYDROCHLORIDE 2 MG/2ML
INJECTION, SOLUTION INTRAMUSCULAR; INTRAVENOUS CODE/TRAUMA/SEDATION MEDICATION
Status: COMPLETED | OUTPATIENT
Start: 2021-10-13 | End: 2021-10-13

## 2021-10-13 RX ORDER — FENTANYL CITRATE 50 UG/ML
INJECTION, SOLUTION INTRAMUSCULAR; INTRAVENOUS CODE/TRAUMA/SEDATION MEDICATION
Status: COMPLETED | OUTPATIENT
Start: 2021-10-13 | End: 2021-10-13

## 2021-10-13 RX ORDER — SODIUM CHLORIDE 9 MG/ML
75 INJECTION, SOLUTION INTRAVENOUS CONTINUOUS
Status: DISCONTINUED | OUTPATIENT
Start: 2021-10-13 | End: 2021-10-17 | Stop reason: HOSPADM

## 2021-10-13 RX ORDER — LIDOCAINE WITH 8.4% SOD BICARB 0.9%(10ML)
SYRINGE (ML) INJECTION CODE/TRAUMA/SEDATION MEDICATION
Status: COMPLETED | OUTPATIENT
Start: 2021-10-13 | End: 2021-10-13

## 2021-10-13 RX ADMIN — FENTANYL CITRATE 25 MCG: 50 INJECTION, SOLUTION INTRAMUSCULAR; INTRAVENOUS at 10:08

## 2021-10-13 RX ADMIN — Medication 8 ML: at 10:08

## 2021-10-13 RX ADMIN — FENTANYL CITRATE 50 MCG: 50 INJECTION, SOLUTION INTRAMUSCULAR; INTRAVENOUS at 10:04

## 2021-10-13 RX ADMIN — MIDAZOLAM HYDROCHLORIDE 0.5 MG: 1 INJECTION, SOLUTION INTRAMUSCULAR; INTRAVENOUS at 10:08

## 2021-10-13 RX ADMIN — FENTANYL CITRATE 50 MCG: 50 INJECTION, SOLUTION INTRAMUSCULAR; INTRAVENOUS at 10:13

## 2021-10-13 RX ADMIN — MIDAZOLAM HYDROCHLORIDE 0.5 MG: 1 INJECTION, SOLUTION INTRAMUSCULAR; INTRAVENOUS at 10:12

## 2021-10-13 RX ADMIN — FENTANYL CITRATE 25 MCG: 50 INJECTION, SOLUTION INTRAMUSCULAR; INTRAVENOUS at 10:12

## 2021-10-13 RX ADMIN — MIDAZOLAM HYDROCHLORIDE 1 MG: 1 INJECTION, SOLUTION INTRAMUSCULAR; INTRAVENOUS at 10:04

## 2021-10-13 RX ADMIN — MIDAZOLAM HYDROCHLORIDE 1 MG: 1 INJECTION, SOLUTION INTRAMUSCULAR; INTRAVENOUS at 10:13

## 2021-10-13 RX ADMIN — SODIUM CHLORIDE 75 ML/HR: 0.9 INJECTION, SOLUTION INTRAVENOUS at 09:24

## 2021-10-14 LAB — SCAN RESULT: NORMAL

## 2021-10-18 ENCOUNTER — TELEPHONE (OUTPATIENT)
Dept: GASTROENTEROLOGY | Facility: CLINIC | Age: 71
End: 2021-10-18

## 2021-10-19 ENCOUNTER — HOSPITAL ENCOUNTER (OUTPATIENT)
Dept: INFUSION CENTER | Facility: CLINIC | Age: 71
Discharge: HOME | End: 2021-10-19
Payer: COMMERCIAL

## 2021-10-19 VITALS
TEMPERATURE: 96.2 F | DIASTOLIC BLOOD PRESSURE: 79 MMHG | HEART RATE: 94 BPM | RESPIRATION RATE: 18 BRPM | SYSTOLIC BLOOD PRESSURE: 128 MMHG

## 2021-10-19 DIAGNOSIS — K50.018 CROHN'S DISEASE OF SMALL INTESTINE WITH OTHER COMPLICATION (HCC): Primary | ICD-10-CM

## 2021-10-19 PROCEDURE — 96413 CHEMO IV INFUSION 1 HR: CPT

## 2021-10-19 RX ORDER — ACETAMINOPHEN 325 MG/1
650 TABLET ORAL ONCE
Status: COMPLETED | OUTPATIENT
Start: 2021-10-19 | End: 2021-10-19

## 2021-10-19 RX ORDER — DIPHENHYDRAMINE HCL 25 MG
25 TABLET ORAL ONCE
Status: CANCELLED | OUTPATIENT
Start: 2021-12-14

## 2021-10-19 RX ORDER — SODIUM CHLORIDE 9 MG/ML
20 INJECTION, SOLUTION INTRAVENOUS ONCE
Status: CANCELLED | OUTPATIENT
Start: 2021-12-14

## 2021-10-19 RX ORDER — ACETAMINOPHEN 325 MG/1
650 TABLET ORAL ONCE
Status: CANCELLED | OUTPATIENT
Start: 2021-12-14

## 2021-10-19 RX ORDER — DIPHENHYDRAMINE HCL 25 MG
25 TABLET ORAL ONCE
Status: COMPLETED | OUTPATIENT
Start: 2021-10-19 | End: 2021-10-19

## 2021-10-19 RX ORDER — SODIUM CHLORIDE 9 MG/ML
20 INJECTION, SOLUTION INTRAVENOUS ONCE
Status: COMPLETED | OUTPATIENT
Start: 2021-10-19 | End: 2021-10-19

## 2021-10-19 RX ADMIN — SODIUM CHLORIDE 20 ML/HR: 0.9 INJECTION, SOLUTION INTRAVENOUS at 14:20

## 2021-10-19 RX ADMIN — VEDOLIZUMAB 300 MG: 300 INJECTION, POWDER, LYOPHILIZED, FOR SOLUTION INTRAVENOUS at 15:23

## 2021-10-19 RX ADMIN — ACETAMINOPHEN 650 MG: 325 TABLET, FILM COATED ORAL at 14:21

## 2021-10-19 RX ADMIN — DIPHENHYDRAMINE HYDROCHLORIDE 25 MG: 25 TABLET ORAL at 14:21

## 2021-10-22 ENCOUNTER — OFFICE VISIT (OUTPATIENT)
Dept: HEMATOLOGY ONCOLOGY | Facility: CLINIC | Age: 71
End: 2021-10-22
Payer: COMMERCIAL

## 2021-10-22 VITALS
OXYGEN SATURATION: 98 % | HEART RATE: 75 BPM | HEIGHT: 63 IN | BODY MASS INDEX: 20.02 KG/M2 | WEIGHT: 113 LBS | TEMPERATURE: 97.1 F | RESPIRATION RATE: 16 BRPM

## 2021-10-22 DIAGNOSIS — E46 PROTEIN-CALORIE MALNUTRITION, UNSPECIFIED SEVERITY (HCC): ICD-10-CM

## 2021-10-22 DIAGNOSIS — D47.2 SMOLDERING MULTIPLE MYELOMA (SMM): Primary | ICD-10-CM

## 2021-10-22 DIAGNOSIS — E11.9 DIABETES MELLITUS WITHOUT COMPLICATION (HCC): ICD-10-CM

## 2021-10-22 DIAGNOSIS — J44.9 ASTHMA WITH COPD (HCC): ICD-10-CM

## 2021-10-22 PROCEDURE — 99214 OFFICE O/P EST MOD 30 MIN: CPT | Performed by: INTERNAL MEDICINE

## 2021-10-25 LAB
MISCELLANEOUS LAB TEST RESULT: NORMAL
SCAN RESULT: NORMAL

## 2021-11-29 ENCOUNTER — TELEPHONE (OUTPATIENT)
Dept: GASTROENTEROLOGY | Facility: CLINIC | Age: 71
End: 2021-11-29

## 2021-12-10 ENCOUNTER — LAB (OUTPATIENT)
Dept: LAB | Facility: CLINIC | Age: 71
End: 2021-12-10
Payer: COMMERCIAL

## 2021-12-10 ENCOUNTER — OFFICE VISIT (OUTPATIENT)
Dept: GASTROENTEROLOGY | Facility: CLINIC | Age: 71
End: 2021-12-10
Payer: COMMERCIAL

## 2021-12-10 VITALS
BODY MASS INDEX: 19.24 KG/M2 | RESPIRATION RATE: 18 BRPM | WEIGHT: 108.6 LBS | HEIGHT: 63 IN | DIASTOLIC BLOOD PRESSURE: 62 MMHG | SYSTOLIC BLOOD PRESSURE: 120 MMHG

## 2021-12-10 DIAGNOSIS — K50.018 CROHN'S DISEASE OF SMALL INTESTINE WITH OTHER COMPLICATION (HCC): ICD-10-CM

## 2021-12-10 DIAGNOSIS — K50.019 CROHN'S DISEASE OF SMALL INTESTINE WITH COMPLICATION (HCC): ICD-10-CM

## 2021-12-10 DIAGNOSIS — K50.019 CROHN'S DISEASE OF SMALL INTESTINE WITH COMPLICATION (HCC): Primary | ICD-10-CM

## 2021-12-10 LAB
ALBUMIN SERPL BCP-MCNC: 2.9 G/DL (ref 3.5–5)
ALP SERPL-CCNC: 110 U/L (ref 46–116)
ALT SERPL W P-5'-P-CCNC: 26 U/L (ref 12–78)
ANION GAP SERPL CALCULATED.3IONS-SCNC: 12 MMOL/L (ref 4–13)
AST SERPL W P-5'-P-CCNC: 14 U/L (ref 5–45)
BASOPHILS # BLD AUTO: 0.03 THOUSANDS/ΜL (ref 0–0.1)
BASOPHILS NFR BLD AUTO: 1 % (ref 0–1)
BILIRUB SERPL-MCNC: 0.5 MG/DL (ref 0.2–1)
BUN SERPL-MCNC: 24 MG/DL (ref 5–25)
CALCIUM ALBUM COR SERPL-MCNC: 10.5 MG/DL (ref 8.3–10.1)
CALCIUM SERPL-MCNC: 9.6 MG/DL (ref 8.3–10.1)
CHLORIDE SERPL-SCNC: 94 MMOL/L (ref 100–108)
CO2 SERPL-SCNC: 25 MMOL/L (ref 21–32)
CREAT SERPL-MCNC: 0.99 MG/DL (ref 0.6–1.3)
CRP SERPL QL: 58 MG/L
EOSINOPHIL # BLD AUTO: 0.03 THOUSAND/ΜL (ref 0–0.61)
EOSINOPHIL NFR BLD AUTO: 1 % (ref 0–6)
ERYTHROCYTE [DISTWIDTH] IN BLOOD BY AUTOMATED COUNT: 16.5 % (ref 11.6–15.1)
FERRITIN SERPL-MCNC: 332 NG/ML (ref 8–388)
GFR SERPL CREATININE-BSD FRML MDRD: 67 ML/MIN/1.73SQ M
GLUCOSE P FAST SERPL-MCNC: 111 MG/DL (ref 65–99)
HCT VFR BLD AUTO: 38.5 % (ref 34.8–46.1)
HGB BLD-MCNC: 12 G/DL (ref 11.5–15.4)
IMM GRANULOCYTES # BLD AUTO: 0.01 THOUSAND/UL (ref 0–0.2)
IMM GRANULOCYTES NFR BLD AUTO: 0 % (ref 0–2)
IRON SATN MFR SERPL: 15 % (ref 15–50)
IRON SERPL-MCNC: 54 UG/DL (ref 50–170)
LYMPHOCYTES # BLD AUTO: 1.78 THOUSANDS/ΜL (ref 0.6–4.47)
LYMPHOCYTES NFR BLD AUTO: 37 % (ref 14–44)
MCH RBC QN AUTO: 27.5 PG (ref 26.8–34.3)
MCHC RBC AUTO-ENTMCNC: 31.2 G/DL (ref 31.4–37.4)
MCV RBC AUTO: 88 FL (ref 82–98)
MONOCYTES # BLD AUTO: 0.53 THOUSAND/ΜL (ref 0.17–1.22)
MONOCYTES NFR BLD AUTO: 11 % (ref 4–12)
NEUTROPHILS # BLD AUTO: 2.41 THOUSANDS/ΜL (ref 1.85–7.62)
NEUTS SEG NFR BLD AUTO: 50 % (ref 43–75)
NRBC BLD AUTO-RTO: 0 /100 WBCS
PLATELET # BLD AUTO: 471 THOUSANDS/UL (ref 149–390)
PMV BLD AUTO: 10.5 FL (ref 8.9–12.7)
POTASSIUM SERPL-SCNC: 3.5 MMOL/L (ref 3.5–5.3)
PROT SERPL-MCNC: 8.3 G/DL (ref 6.4–8.2)
RBC # BLD AUTO: 4.37 MILLION/UL (ref 3.81–5.12)
SODIUM SERPL-SCNC: 131 MMOL/L (ref 136–145)
TIBC SERPL-MCNC: 359 UG/DL (ref 250–450)
WBC # BLD AUTO: 4.79 THOUSAND/UL (ref 4.31–10.16)

## 2021-12-10 PROCEDURE — 83550 IRON BINDING TEST: CPT

## 2021-12-10 PROCEDURE — 99214 OFFICE O/P EST MOD 30 MIN: CPT | Performed by: PHYSICIAN ASSISTANT

## 2021-12-10 PROCEDURE — 85025 COMPLETE CBC W/AUTO DIFF WBC: CPT

## 2021-12-10 PROCEDURE — 80053 COMPREHEN METABOLIC PANEL: CPT

## 2021-12-10 PROCEDURE — 82728 ASSAY OF FERRITIN: CPT

## 2021-12-10 PROCEDURE — 82397 CHEMILUMINESCENT ASSAY: CPT

## 2021-12-10 PROCEDURE — 86140 C-REACTIVE PROTEIN: CPT

## 2021-12-10 PROCEDURE — 86480 TB TEST CELL IMMUN MEASURE: CPT

## 2021-12-10 PROCEDURE — 83540 ASSAY OF IRON: CPT

## 2021-12-10 PROCEDURE — 36415 COLL VENOUS BLD VENIPUNCTURE: CPT

## 2021-12-10 RX ORDER — PREDNISONE 1 MG/1
TABLET ORAL
Qty: 70 TABLET | Refills: 0 | Status: SHIPPED | OUTPATIENT
Start: 2021-12-10 | End: 2022-01-04 | Stop reason: SDUPTHER

## 2021-12-13 LAB
GAMMA INTERFERON BACKGROUND BLD IA-ACNC: 0.06 IU/ML
M TB IFN-G BLD-IMP: NEGATIVE
M TB IFN-G CD4+ BCKGRND COR BLD-ACNC: 0 IU/ML
M TB IFN-G CD4+ BCKGRND COR BLD-ACNC: 0.02 IU/ML
MITOGEN IGNF BCKGRD COR BLD-ACNC: 5 IU/ML

## 2021-12-14 ENCOUNTER — HOSPITAL ENCOUNTER (OUTPATIENT)
Dept: INFUSION CENTER | Facility: CLINIC | Age: 71
Discharge: HOME | End: 2021-12-14
Payer: COMMERCIAL

## 2021-12-14 VITALS
DIASTOLIC BLOOD PRESSURE: 76 MMHG | BODY MASS INDEX: 19.06 KG/M2 | TEMPERATURE: 97.6 F | WEIGHT: 107.58 LBS | HEART RATE: 86 BPM | SYSTOLIC BLOOD PRESSURE: 145 MMHG | RESPIRATION RATE: 16 BRPM

## 2021-12-14 DIAGNOSIS — K50.018 CROHN'S DISEASE OF SMALL INTESTINE WITH OTHER COMPLICATION (HCC): Primary | ICD-10-CM

## 2021-12-14 PROCEDURE — 96413 CHEMO IV INFUSION 1 HR: CPT

## 2021-12-14 RX ORDER — DIPHENHYDRAMINE HCL 25 MG
25 TABLET ORAL ONCE
Status: COMPLETED | OUTPATIENT
Start: 2021-12-14 | End: 2021-12-14

## 2021-12-14 RX ORDER — ACETAMINOPHEN 325 MG/1
650 TABLET ORAL ONCE
Status: COMPLETED | OUTPATIENT
Start: 2021-12-14 | End: 2021-12-14

## 2021-12-14 RX ORDER — DIPHENHYDRAMINE HCL 25 MG
25 TABLET ORAL ONCE
Status: CANCELLED | OUTPATIENT
Start: 2022-02-08

## 2021-12-14 RX ORDER — SODIUM CHLORIDE 9 MG/ML
20 INJECTION, SOLUTION INTRAVENOUS ONCE
Status: COMPLETED | OUTPATIENT
Start: 2021-12-14 | End: 2021-12-14

## 2021-12-14 RX ORDER — ACETAMINOPHEN 325 MG/1
650 TABLET ORAL ONCE
Status: CANCELLED | OUTPATIENT
Start: 2022-02-08

## 2021-12-14 RX ORDER — SODIUM CHLORIDE 9 MG/ML
20 INJECTION, SOLUTION INTRAVENOUS ONCE
Status: CANCELLED | OUTPATIENT
Start: 2022-02-08

## 2021-12-14 RX ADMIN — DIPHENHYDRAMINE HYDROCHLORIDE 25 MG: 25 TABLET ORAL at 14:32

## 2021-12-14 RX ADMIN — VEDOLIZUMAB 300 MG: 300 INJECTION, POWDER, LYOPHILIZED, FOR SOLUTION INTRAVENOUS at 15:11

## 2021-12-14 RX ADMIN — SODIUM CHLORIDE 20 ML/HR: 0.9 INJECTION, SOLUTION INTRAVENOUS at 14:32

## 2021-12-14 RX ADMIN — ACETAMINOPHEN 650 MG: 325 TABLET, FILM COATED ORAL at 14:32

## 2021-12-20 ENCOUNTER — TELEPHONE (OUTPATIENT)
Dept: GASTROENTEROLOGY | Facility: HOSPITAL | Age: 71
End: 2021-12-20

## 2021-12-21 ENCOUNTER — ANESTHESIA (OUTPATIENT)
Dept: GASTROENTEROLOGY | Facility: HOSPITAL | Age: 71
End: 2021-12-21

## 2021-12-21 ENCOUNTER — ANESTHESIA EVENT (OUTPATIENT)
Dept: GASTROENTEROLOGY | Facility: HOSPITAL | Age: 71
End: 2021-12-21

## 2021-12-21 ENCOUNTER — HOSPITAL ENCOUNTER (OUTPATIENT)
Dept: GASTROENTEROLOGY | Facility: HOSPITAL | Age: 71
Setting detail: OUTPATIENT SURGERY
Discharge: HOME | End: 2021-12-21
Attending: INTERNAL MEDICINE | Admitting: INTERNAL MEDICINE
Payer: COMMERCIAL

## 2021-12-21 ENCOUNTER — TELEPHONE (OUTPATIENT)
Dept: GASTROENTEROLOGY | Facility: CLINIC | Age: 71
End: 2021-12-21

## 2021-12-21 VITALS
HEART RATE: 69 BPM | RESPIRATION RATE: 20 BRPM | OXYGEN SATURATION: 100 % | DIASTOLIC BLOOD PRESSURE: 61 MMHG | TEMPERATURE: 98.5 F | SYSTOLIC BLOOD PRESSURE: 117 MMHG

## 2021-12-21 DIAGNOSIS — K50.019 CROHN'S DISEASE OF SMALL INTESTINE WITH COMPLICATION (HCC): ICD-10-CM

## 2021-12-21 DIAGNOSIS — R63.4 WEIGHT LOSS: Primary | ICD-10-CM

## 2021-12-21 DIAGNOSIS — R10.84 GENERALIZED ABDOMINAL PAIN: ICD-10-CM

## 2021-12-21 LAB — GLUCOSE SERPL-MCNC: 116 MG/DL (ref 65–140)

## 2021-12-21 PROCEDURE — 82948 REAGENT STRIP/BLOOD GLUCOSE: CPT

## 2021-12-21 PROCEDURE — 43239 EGD BIOPSY SINGLE/MULTIPLE: CPT | Performed by: INTERNAL MEDICINE

## 2021-12-21 PROCEDURE — 88305 TISSUE EXAM BY PATHOLOGIST: CPT | Performed by: PATHOLOGY

## 2021-12-21 RX ORDER — PROPOFOL 10 MG/ML
INJECTION, EMULSION INTRAVENOUS AS NEEDED
Status: DISCONTINUED | OUTPATIENT
Start: 2021-12-21 | End: 2021-12-21

## 2021-12-21 RX ORDER — LIDOCAINE HYDROCHLORIDE 20 MG/ML
INJECTION, SOLUTION EPIDURAL; INFILTRATION; INTRACAUDAL; PERINEURAL AS NEEDED
Status: DISCONTINUED | OUTPATIENT
Start: 2021-12-21 | End: 2021-12-21

## 2021-12-21 RX ORDER — SODIUM CHLORIDE, SODIUM LACTATE, POTASSIUM CHLORIDE, CALCIUM CHLORIDE 600; 310; 30; 20 MG/100ML; MG/100ML; MG/100ML; MG/100ML
125 INJECTION, SOLUTION INTRAVENOUS CONTINUOUS
Status: DISCONTINUED | OUTPATIENT
Start: 2021-12-21 | End: 2021-12-25 | Stop reason: HOSPADM

## 2021-12-21 RX ADMIN — PROPOFOL 30 MG: 10 INJECTION, EMULSION INTRAVENOUS at 11:17

## 2021-12-21 RX ADMIN — SODIUM CHLORIDE, SODIUM LACTATE, POTASSIUM CHLORIDE, AND CALCIUM CHLORIDE: .6; .31; .03; .02 INJECTION, SOLUTION INTRAVENOUS at 10:43

## 2021-12-21 RX ADMIN — PROPOFOL 90 MG: 10 INJECTION, EMULSION INTRAVENOUS at 11:12

## 2021-12-21 RX ADMIN — LIDOCAINE HYDROCHLORIDE 50 MG: 20 INJECTION, SOLUTION EPIDURAL; INFILTRATION; INTRACAUDAL; PERINEURAL at 11:12

## 2021-12-21 RX ADMIN — PROPOFOL 30 MG: 10 INJECTION, EMULSION INTRAVENOUS at 11:15

## 2021-12-22 ENCOUNTER — APPOINTMENT (OUTPATIENT)
Dept: LAB | Facility: CLINIC | Age: 71
End: 2021-12-22
Payer: COMMERCIAL

## 2021-12-22 DIAGNOSIS — K50.019 CROHN'S DISEASE OF SMALL INTESTINE WITH COMPLICATION (HCC): ICD-10-CM

## 2021-12-22 PROCEDURE — 83993 ASSAY FOR CALPROTECTIN FECAL: CPT

## 2021-12-22 PROCEDURE — 87505 NFCT AGENT DETECTION GI: CPT

## 2021-12-23 LAB
C DIFF TOX GENS STL QL NAA+PROBE: NEGATIVE
CAMPYLOBACTER DNA SPEC NAA+PROBE: NORMAL
SALMONELLA DNA SPEC QL NAA+PROBE: NORMAL
SHIGA TOXIN STX GENE SPEC NAA+PROBE: NORMAL
SHIGELLA DNA SPEC QL NAA+PROBE: NORMAL

## 2021-12-27 ENCOUNTER — TELEPHONE (OUTPATIENT)
Dept: GASTROENTEROLOGY | Facility: CLINIC | Age: 71
End: 2021-12-27

## 2021-12-27 LAB — CALPROTECTIN STL-MCNT: 1077 UG/G (ref 0–120)

## 2021-12-28 ENCOUNTER — TELEPHONE (OUTPATIENT)
Dept: GASTROENTEROLOGY | Facility: CLINIC | Age: 71
End: 2021-12-28

## 2022-01-04 ENCOUNTER — OFFICE VISIT (OUTPATIENT)
Dept: GASTROENTEROLOGY | Facility: CLINIC | Age: 72
End: 2022-01-04
Payer: COMMERCIAL

## 2022-01-04 VITALS
RESPIRATION RATE: 18 BRPM | HEIGHT: 63 IN | DIASTOLIC BLOOD PRESSURE: 70 MMHG | BODY MASS INDEX: 20.16 KG/M2 | SYSTOLIC BLOOD PRESSURE: 120 MMHG | WEIGHT: 113.8 LBS

## 2022-01-04 DIAGNOSIS — K50.018 CROHN'S DISEASE OF SMALL INTESTINE WITH OTHER COMPLICATION (HCC): ICD-10-CM

## 2022-01-04 PROCEDURE — 99214 OFFICE O/P EST MOD 30 MIN: CPT | Performed by: PHYSICIAN ASSISTANT

## 2022-01-04 RX ORDER — PREDNISONE 1 MG/1
TABLET ORAL
Qty: 70 TABLET | Refills: 0 | Status: SHIPPED | OUTPATIENT
Start: 2022-01-04 | End: 2022-03-01 | Stop reason: ALTCHOICE

## 2022-01-04 NOTE — PROGRESS NOTES
[facility] - Outpatient Follow-up Note  Gregory Corral 73 y o  female MRN: [id number]  Encounter: [id number]          ASSESSMENT AND PLAN:      1  Crohn's disease of small intestine with other complication [facility])    Patient has a long, complicated history  She originally was diagnosed with UC and underwent a total proctocolectomy with IPAA in 2007  In 2019, she was admitted with a SBO and found to have a stricture at the pouch anal anastomosis and adhesions and underwent dilation of the stricture complicated by microperforation, ex lap, RONA, and creation of loop ileostomy (intraoperatively she was noted to have large linear ulcerations without the pouch and afferent limb of the small bowel)  She then had an ileostomy reversal in 10/2019    Colonoscopy in [facility] 2020 by her colorectal surgeon Dr Khadra Ferris in [facility] showed an anal stricture which was dilated and aphthous ulcerations in the afferent limp, a hemorrhagic appearance and inflammation of the ileum consistent with SB crohn's disease and she was referred here to start biological treatment  She was also given a Prednisone taper at that time in the spring which temporarily helped her symptoms  She followed up here first in August of 2020  CT enterography 8/31/2020 showed ileitis and pouchitis with high grade narrowing at the IPA anastomosis  She was started on Entyvio 300mg infusions in September of 2020 (due to her comorbidities and age)    She received her first Entyvio infusion on 9/23 but then cancelled and no showed for the additional induction infusions and had no treatment for over a month   She was restarted on Entyvio 11/4   She then had a further interruption of treatment in February of 2021 when she had issues with her insurance  Chante Crew has since been back on her Enytvio every 8 weeks though  She also previously received iron infusions for an iron deficiency anemia with normalization of her HGB    She had a return of abdominal pain and diarrhea 5 times a day last month and was started on Prednisone course  C diff PCR was negative  Fecal calprotectin was over 1000  CRP 58  [facility]on[facility] an anal stricture that was dilated and inflammation, erosions, ulceration in the ileal pouch  She reports her symptoms are significantly improving now on the Prednisone taper  Follow up pending Vedolizumab level - if level if low, will plan to increase Entyvio 300mg IV to every 4 weeks  Follow up pending CT enterography to investigate  Continue Prednisone taper  She will continue at 20mg daily q this week with decrease by 5mg q week      Follow up in 8 weeks  ______________________________________________________________________    SUBJECTIVE:  Patient is a 73year old female who presents to the office for follow up  She developed a return of diarrhea and abdominal pain last month  C diff PCR was negative  Fecal calprotectin was over 1000  Colonoscopy last month 12/2021 [facility] showed an anal stricture that was dilated and inflammation, erosions, ulceration in the ileal pouch  She reports her symptoms are significantly improving now on the Prednisone taper  She no longer has any abdominal pain  Her stools are less frequent and more formed  No rectal bleeding  She is gaining weight  She is scheduled for a CT enterography  She reports she had her influenza vaccine and Covid booster  She had a DXA scan last month  Her Entyvio level is pending  REVIEW OF SYSTEMS IS OTHERWISE NEGATIVE        Historical Information   Past Medical History:   Diagnosis Date    Asthma     [facility]) [facility] )     Crohn's disease ([facility] )     [facility]     Hypertension     Ocular hypertension      Past Surgical History:   Procedure Laterality Date    CATARACT EXTRACTION      COLON SURGERY      Partial colectomy, partial removal of the rectum    EXPLORATORY LAPAROTOMY      ILEOSTOMY      IR BIOPSY BONE MARROW  10/13/2021     Social History   Social History     Substance and Sexual Activity   Alcohol Use No     Social History     Substance and Sexual Activity   Drug Use No     Social History     Tobacco Use   Smoking Status Never Smoker   Smokeless Tobacco Never Used     Family History   Problem Relation Age of Onset    Diabetes Mother     Hypertension Mother        Meds/Allergies       Current Outpatient Medications:     albuterol (PROVENTIL HFA,VENTOLIN HFA) 90 mcg/act inhaler    Ascorbic Acid (Vitamin C) 500 MG CAPS    bimatoprost (LUMIGAN) 0 01 % ophthalmic drops    Cyanocobalamin (B-12) 5000 MCG CAPS    dorzolamide-timolol (COSOPT) 22 3-6 8 MG/ML ophthalmic solution    Entyvio SOLR    famotidine (PEPCID) 40 MG tablet    ferrous sulfate 325 (65 FE) MG EC tablet    fluticasone (FLONASE) 50 mcg/act nasal spray    Folic Acid 20 MG CAPS    ipratropium (ATROVENT) 0 03 % nasal spray    levothyroxine 25 mcg tablet    loperamide (IMODIUM) 2 mg capsule    mometasone (NASONEX) 50 mcg/act nasal spray    mometasone-formoterol (DULERA) 200-5 MCG/ACT inhaler    Multiple Vitamins-Minerals (OCUTABS-LUTEIN) TABS    Nebulizers (Nebulizer Compressor) MISC    omeprazole (PriLOSEC) 20 mg delayed release capsule    ondansetron (ZOFRAN) 4 mg tablet    potassium chloride (KLOR-CON M20) 20 mEq tablet    predniSONE 5 mg tablet    simethicone (MYLICON) 80 mg chewable tablet    timolol (TIMOPTIC) 0 25 % ophthalmic solution    vedolizumab (Entyvio) SOLR    Zinc 50 MG CAPS    Allergies   Allergen Reactions    Brimonidine      Red eyes    Tramadol Nausea Only           Objective     Blood pressure 120/70, resp  rate 18, height 5' 3" (1 6 m), weight 51 6 kg (113 lb 12 8 oz)  Body mass index is 20 16 kg/m²        PHYSICAL EXAM:      General Appearance:   Alert, cooperative, no distress   HEENT:   Normocephalic, atraumatic, anicteric    Neck:  Supple, symmetrical, trachea midline   Lungs:   Clear to auscultation bilaterally; no rales, rhonchi or wheezing; respirations unlabored    Heart[de-identified]   Regular rate and rhythm; no murmur, rub, or gallop  Abdomen:   Soft, non-tender, non-distended; normal bowel sounds; no masses, no organomegaly    Genitalia:   Deferred    Rectal:   Deferred    Extremities:  No cyanosis, clubbing or edema    Pulses:  2+ and symmetric    Skin:  No jaundice, rashes, or lesions    Lymph nodes:  No palpable cervical lymphadenopathy        Lab Results:   No visits with results within 1 Day(s) from this visit  Latest known visit with results is:   Appointment on 12/22/2021   Component Date Value    Salmonella sp PCR 12/22/2021 None Detected     Shigella sp/Enteroinvasi* 12/22/2021 None Detected     Campylobacter sp (jejuni* 12/22/2021 None Detected     Shiga toxin 1/Shiga toxi* 12/22/2021 None Detected     C difficile toxin by PCR 12/22/2021 Negative     Calprotectin 12/22/2021 1077*         Radiology Results:   EGD    Result Date: 12/21/2021  Narrative:  [id number] [address] [place] [id number] [phone] DATE OF SERVICE: 12/21/21 PHYSICIAN(S): Freddie Palafox MD - Attending Physician INDICATION: Crohn's disease of small intestine with complication ([address] ), epigastric pain, weight loss, iron deficiency anemia POST-OP DIAGNOSIS: See the impression below  PREPROCEDURE: Informed consent was obtained for the procedure, including sedation  Risks of perforation, hemorrhage, adverse drug reaction and aspiration were discussed  The patient was placed in the left lateral decubitus position  Patient was explained about the risks and benefits of the procedure  Risks including but not limited to bleeding, infection, and perforation were explained in detail  Also explained about less than 100% sensitivity with the exam and other alternatives   DETAILS OF PROCEDURE: Patient was taken to the procedure room where a time out was performed to confirm correct patient and correct procedure  The patient underwent monitored anesthesia care, which was administered by an anesthesia professional  The patient's blood pressure, heart rate, level of consciousness, respirations and oxygen were monitored throughout the procedure  The scope was advanced to the third part of the duodenum  Retroflexion was performed in the cardia, fundus and incisura  The patient's estimated blood loss was minimal (<5 mL)  The procedure was not difficult  The patient tolerated the procedure well  There were no apparent complications  ANESTHESIA INFORMATION: ASA: II Anesthesia Type: IV Sedation with Anesthesia MEDICATIONS: No administrations occurring from 1110 to 1120 on 12/21/21 FINDINGS: Mild, localized abnormal mucosa with erosion in the duodenal bulb; performed cold forceps biopsy The upper third of the esophagus, middle third of the esophagus, lower third of the esophagus, GE junction, Z-line, cardia, body of the stomach, incisura, antrum, prepyloric region, pylorus, 2nd part of the duodenum and 3rd part of the duodenum appeared normal  Performed biopsies in the body of the stomach and antrum One 1 mm sessile polyp in the fundus of the stomach; performed complete en bloc removal by cold forceps biopsy SPECIMENS: ID Type Source Tests Collected by Time Destination 1 : duodenal bulb Tissue Duodenum TISSUE EXAM Carrie De Leon MD 12/21/2021 11:17 AM  2 : antrum Tissue Stomach TISSUE EXAM Carrie De Leon MD 12/21/2021 11:20 AM  3 : body Tissue Stomach TISSUE EXAM Carrie De Leon MD 12/21/2021 11:20 AM      Impression: 1  Diminutive fundic polyp status post excisional biopsy removed via 2  Minimal duodenal bulb erosion, status post biopsy 3   Otherwise normal EGD, status post gastric biopsies RECOMMENDATION: Ultrasound abdomen Go for CT enterography as scheduled Follow-up biopsy results in 1 week Continue current medical management  Clarence Carrion MD

## 2022-01-21 ENCOUNTER — TELEPHONE (OUTPATIENT)
Dept: GASTROENTEROLOGY | Facility: CLINIC | Age: 72
End: 2022-01-21

## 2022-01-24 NOTE — TELEPHONE ENCOUNTER
Spoke with [facility] lab services in [facility] , they stated the pt had the blood work done in [facility] and then the sample was sent to [facility]  They are going to get back in touch with us later today

## 2022-02-01 ENCOUNTER — TELEPHONE (OUTPATIENT)
Dept: GASTROENTEROLOGY | Facility: CLINIC | Age: 72
End: 2022-02-01

## 2022-02-01 NOTE — TELEPHONE ENCOUNTER
Called [facility] at [phone] spoke to Balbir, she transformed me to [address] he made change to frequency to infusions every 4 weeks   Next infusion is 2/8/22

## 2022-02-08 ENCOUNTER — HOSPITAL ENCOUNTER (OUTPATIENT)
Dept: INFUSION CENTER | Facility: CLINIC | Age: 72
Discharge: HOME | End: 2022-02-08
Payer: COMMERCIAL

## 2022-02-08 VITALS
SYSTOLIC BLOOD PRESSURE: 121 MMHG | RESPIRATION RATE: 18 BRPM | HEART RATE: 65 BPM | DIASTOLIC BLOOD PRESSURE: 62 MMHG | OXYGEN SATURATION: 97 % | TEMPERATURE: 98.5 F

## 2022-02-08 DIAGNOSIS — K50.018 CROHN'S DISEASE OF SMALL INTESTINE WITH OTHER COMPLICATION (HCC): Primary | ICD-10-CM

## 2022-02-08 PROCEDURE — 96413 CHEMO IV INFUSION 1 HR: CPT

## 2022-02-08 RX ORDER — DIPHENHYDRAMINE HCL 25 MG
25 TABLET ORAL ONCE
Status: CANCELLED | OUTPATIENT
Start: 2022-03-08

## 2022-02-08 RX ORDER — SODIUM CHLORIDE 9 MG/ML
20 INJECTION, SOLUTION INTRAVENOUS ONCE
Status: CANCELLED | OUTPATIENT
Start: 2022-03-08

## 2022-02-08 RX ORDER — ACETAMINOPHEN 325 MG/1
650 TABLET ORAL ONCE
Status: COMPLETED | OUTPATIENT
Start: 2022-02-08 | End: 2022-02-08

## 2022-02-08 RX ORDER — SODIUM CHLORIDE 9 MG/ML
20 INJECTION, SOLUTION INTRAVENOUS ONCE
Status: COMPLETED | OUTPATIENT
Start: 2022-02-08 | End: 2022-02-08

## 2022-02-08 RX ORDER — ACETAMINOPHEN 325 MG/1
650 TABLET ORAL ONCE
Status: CANCELLED | OUTPATIENT
Start: 2022-03-08

## 2022-02-08 RX ORDER — DIPHENHYDRAMINE HCL 25 MG
25 TABLET ORAL ONCE
Status: COMPLETED | OUTPATIENT
Start: 2022-02-08 | End: 2022-02-08

## 2022-02-08 RX ADMIN — SODIUM CHLORIDE 20 ML/HR: 0.9 INJECTION, SOLUTION INTRAVENOUS at 13:50

## 2022-02-08 RX ADMIN — DIPHENHYDRAMINE HYDROCHLORIDE 25 MG: 25 TABLET ORAL at 13:54

## 2022-02-08 RX ADMIN — VEDOLIZUMAB 300 MG: 300 INJECTION, POWDER, LYOPHILIZED, FOR SOLUTION INTRAVENOUS at 14:45

## 2022-02-08 RX ADMIN — ACETAMINOPHEN 650 MG: 325 TABLET, FILM COATED ORAL at 13:54

## 2022-02-08 NOTE — PROGRESS NOTES
Pt to clinic for Entyvio infusion  Pt denies any current antibiotic use or signs/symptoms of recent or recurrent infection/illness  Pt offers no complaints at this time, resting comfortably in recliner, will continue to monitor

## 2022-02-08 NOTE — PROGRESS NOTES
Entyvio plan needed review prior to pt's infusion today  John Mcfadden RN spoke with [place] PA who instructed Dr Jessica Zambrano to review therapy plan  Plan deleted and re-entered for treatment today

## 2022-02-08 NOTE — TELEPHONE ENCOUNTER
I didn't receive an approval letter but I did call and spoke to [facility] and she confirmed frequency  change

## 2022-02-09 ENCOUNTER — HOSPITAL ENCOUNTER (OUTPATIENT)
Dept: CT IMAGING | Facility: HOSPITAL | Age: 72
Discharge: HOME | End: 2022-02-09
Payer: COMMERCIAL

## 2022-02-09 DIAGNOSIS — K50.019 CROHN'S DISEASE OF SMALL INTESTINE WITH COMPLICATION (HCC): ICD-10-CM

## 2022-02-09 PROCEDURE — G1004 CDSM NDSC: HCPCS

## 2022-02-09 PROCEDURE — 74177 CT ABD & PELVIS W/CONTRAST: CPT

## 2022-02-09 RX ADMIN — IOHEXOL 100 ML: 350 INJECTION, SOLUTION INTRAVENOUS at 14:17

## 2022-02-14 ENCOUNTER — TELEPHONE (OUTPATIENT)
Dept: GASTROENTEROLOGY | Facility: CLINIC | Age: 72
End: 2022-02-14

## 2022-02-14 NOTE — TELEPHONE ENCOUNTER
I am off and not available tomorrow at that time- I can certainly be available anytime Wednesday through Friday when I am in the office  We are increasing her Entyvio 300mg to q 4 weeks as her Entyvio levels were low and she has active Crohns disease  Please make sure they have all that information/labs/etc   I would be more than happy to talk to them when I am in the office if needed

## 2022-02-15 NOTE — TELEPHONE ENCOUNTER
Dr Ann Christensen approved [facility] treatment for every 4 weeks  Decision was overturned from the denial  Dr Ann Christensen also wanted to think Dr Maura Chance for his extremely helpful input

## 2022-02-16 NOTE — TELEPHONE ENCOUNTER
Spoke to Gurpreet approved increase of frequancy   Appro  # [id number] for 2/8/22-2/16/23  [facility] will deliver medication

## 2022-02-21 ENCOUNTER — TELEPHONE (OUTPATIENT)
Dept: GASTROENTEROLOGY | Facility: CLINIC | Age: 72
End: 2022-02-21

## 2022-02-21 NOTE — TELEPHONE ENCOUNTER
----- Message from Charlee Epperson PA-C sent at 2/18/2022  3:49 PM EST -----  Reviewed with Dr Dov Flores  We are increasing Entyvio intervals to q 4 weeks  She has a colorectal surgeon in [facility] that she follows with as well  ?If she will need stricturoplasty  Will refer to our IBD specialist Dr Brea Franco for input  Informed patient of this  Can you please call her with Dr Bernardo garcia so she can call for an appointment? Thanks!

## 2022-03-01 ENCOUNTER — OFFICE VISIT (OUTPATIENT)
Dept: GASTROENTEROLOGY | Facility: CLINIC | Age: 72
End: 2022-03-01
Payer: COMMERCIAL

## 2022-03-01 VITALS
DIASTOLIC BLOOD PRESSURE: 62 MMHG | BODY MASS INDEX: 21.23 KG/M2 | SYSTOLIC BLOOD PRESSURE: 120 MMHG | HEIGHT: 63 IN | HEART RATE: 66 BPM | WEIGHT: 119.8 LBS | RESPIRATION RATE: 18 BRPM | OXYGEN SATURATION: 98 %

## 2022-03-01 DIAGNOSIS — K50.018 CROHN'S DISEASE OF SMALL INTESTINE WITH OTHER COMPLICATION (HCC): ICD-10-CM

## 2022-03-01 PROCEDURE — 99214 OFFICE O/P EST MOD 30 MIN: CPT | Performed by: PHYSICIAN ASSISTANT

## 2022-03-01 NOTE — PROGRESS NOTES
[facility] - Outpatient Follow-up Note  Chela Coronel 73 y o  female MRN: [id number]  Encounter: [id number]          ASSESSMENT AND PLAN:      1  Crohn's disease of small intestine with other complication [facility])    Patient has a long, complicated history  She originally was diagnosed with UC and underwent a total proctocolectomy with IPAA in 2007  In 2019, she was admitted with a SBO and found to have a stricture at the pouch anal anastomosis and adhesions and underwent dilation of the stricture complicated by microperforation, ex lap, ROAN, and creation of loop ileostomy (intraoperatively she was noted to have large linear ulcerations without the pouch and afferent limb of the small bowel)  She then had an ileostomy reversal in 10/2019    [facility]onoscopy [place] [facility] by her colorectal surgeon Dr Marina Kaiser in [facility] showed an anal stricture which was dilated and aphthous ulcerations in the afferent limp, a hemorrhagic appearance and inflammation of the ileum consistent with SB crohn's disease and she was referred here to start biological treatment  She was also given a Prednisone taper at that time in the spring which temporarily helped her symptoms  She followed up here first in August of 2020  CT enterography 8/31/2020 showed ileitis and pouchitis with high grade narrowing at the IPA anastomosis  She was started on Entyvio 300mg infusions in September of 2020 (due to her comorbidities)   She received her first Entyvio infusion on 9/23 but then cancelled and no showed for the additional induction infusions and had no treatment for over a month   She was restarted on Entyvio 11/4   She then had a further interruption of treatment in February of 2021 when she had issues with her insurance  Orclarence Oliva has since been back on her Enytvio every 8 weeks though  She also previously received iron infusions for an iron deficiency anemia with normalization of her HGB    She had a return of symptoms in December of 2021 with diarrhea and abdominal pain  C diff PCR was negative  Fecal calprotectin was over 1000  CRP 58  Colonoscopy 12/2021 by her colorectal surgeon in [facility] showed an anal stricture that was dilated and inflammation, erosions, ulceration in the ileal pouch  CT enterography 2/9/2022 showed mild active inflammation in the distal ileum/pouch and 2 strictures at the distal ileum/pouch anastomosis and ileoanal anastomosis  Entyvio level was checked and low at 2 6; no anti-vedolizumab antibodies were detected  She was put on a Prednisone taper with improvement in her symptoms which she has now finished  Her Entyvio 300mg infusions have been increased to every 4 weeks (from every 8 weeks) which we are now initiating  We have received authorization through her insurance company  ?If she will end up needing stricturoplasty given her significant strictures  Will refer patient to our IBD specialist Dr Syeda Villarreal for further input/recommendations  Patient is in agreement with referral and will schedule an appointment  Follow up in 2-3 months  She was instructed to call if any concerns/problems in the meantime  ______________________________________________________________________    SUBJECTIVE:  Patient is a pleasant 73year old female who presents to the office for follow up of her crohn's disease  She is feeling better after her recent Prednisone taper  Her abdominal discomfort has subsided  She is having 4-5 small, loose bowel movements a day  No rectal bleeding  She has gained several pounds since her last office visit  Her Enytvio level was low and we have increased her intervals to every 4 weeks  Her insurance has authorized the increased dosing  She is scheduled for her next Entyvio infusion on 3/8  REVIEW OF SYSTEMS IS OTHERWISE NEGATIVE        Historical Information   Past Medical History:   Diagnosis Date    Asthma     COPD [facility]chronic obstructive pulmonary disease) ([facility] )     Crohn's disease [place] [address] )     Diabetes mellitus ([place] [address] )     Hypertension     Multiple myeloma ([place] [address] )     Ocular hypertension      Past Surgical History:   Procedure Laterality Date    CATARACT EXTRACTION      COLON SURGERY      Partial colectomy, partial removal of the rectum    EXPLORATORY LAPAROTOMY      ILEOSTOMY      IR BIOPSY BONE MARROW  10/13/2021     Social History   Social History     Substance and Sexual Activity   Alcohol Use No     Social History     Substance and Sexual Activity   Drug Use No     Social History     Tobacco Use   Smoking Status Never Smoker   Smokeless Tobacco Never Used     Family History   Problem Relation Age of Onset    Diabetes Mother     Hypertension Mother        Meds/Allergies       Current Outpatient Medications:     albuterol (PROVENTIL HFA,VENTOLIN HFA) 90 mcg/act inhaler    Ascorbic Acid (Vitamin C) 500 MG CAPS    bimatoprost (LUMIGAN) 0 01 % ophthalmic drops    Cyanocobalamin (B-12) 5000 MCG CAPS    dorzolamide-timolol (COSOPT) 22 3-6 8 MG/ML ophthalmic solution    Entyvio SOLR    famotidine (PEPCID) 40 MG tablet    ferrous sulfate 325 (65 FE) MG EC tablet    fluticasone (FLONASE) 50 mcg/act nasal spray    Folic Acid 20 MG CAPS    ipratropium (ATROVENT) 0 03 % nasal spray    levothyroxine 25 mcg tablet    loperamide (IMODIUM) 2 mg capsule    mometasone (NASONEX) 50 mcg/act nasal spray    mometasone-formoterol (DULERA) 200-5 MCG/ACT inhaler    Multiple Vitamins-Minerals (OCUTABS-LUTEIN) TABS    Nebulizers (Nebulizer Compressor) MISC    omeprazole (PriLOSEC) 20 mg delayed release capsule    ondansetron (ZOFRAN) 4 mg tablet    potassium chloride (KLOR-CON M20) 20 mEq tablet    simethicone (MYLICON) 80 mg chewable tablet    timolol (TIMOPTIC) 0 25 % ophthalmic solution    vedolizumab (Entyvio) SOLR    Zinc 50 MG CAPS    Allergies   Allergen Reactions    Brimonidine      Red eyes    Tramadol Nausea Only           Objective Blood pressure 120/62, pulse 66, resp  rate 18, height 5' 3" (1 6 m), weight 54 3 kg (119 lb 12 8 oz), SpO2 98 %  Body mass index is 21 22 kg/m²  PHYSICAL EXAM:      General Appearance:   Alert, cooperative, no distress   HEENT:   Normocephalic, atraumatic, anicteric    Neck:  Supple, symmetrical, trachea midline   Lungs:   Clear to auscultation bilaterally; no rales, rhonchi or wheezing; respirations unlabored    Heart[de-identified]   Regular rate and rhythm; no murmur, rub, or gallop  Abdomen:   Soft, non-tender, non-distended; normal bowel sounds; no masses, no organomegaly    Genitalia:   Deferred    Rectal:   Deferred    Extremities:  No cyanosis, clubbing or edema    Pulses:  2+ and symmetric    Skin:  No jaundice, rashes, or lesions    Lymph nodes:  No palpable cervical lymphadenopathy        Lab Results:   No visits with results within 1 Day(s) from this visit  Latest known visit with results is:   Appointment on 12/22/2021   Component Date Value    Salmonella sp PCR 12/22/2021 None Detected     Shigella sp/Enteroinvasi* 12/22/2021 None Detected     Campylobacter sp (jejuni* 12/22/2021 None Detected     Shiga toxin 1/Shiga toxi* 12/22/2021 None Detected     C difficile toxin by PCR 12/22/2021 Negative     Calprotectin 12/22/2021 1077*         Radiology Results:   CT small bowel enterography    Result Date: 2/15/2022  Narrative: CT ABDOMEN AND PELVIS WITH IV CONTRAST- ENTEROGRAPHY - WITH CONTRAST INDICATION:   K50 019: Crohn's disease of small intestine with unspecified complications  COMPARISON:  CT abdomen pelvis 8/31/2020 TECHNIQUE:  Contrast-enhanced CT examination of the abdomen and pelvis was performed utilizing thin section technique and after the administration of low density enteric contrast according to protocol designed specifically to obtain sensitive evaluation of the small bowel    Axial, sagittal, and coronal 2D reformatted images were created from the source data and submitted for interpretation  Radiation dose length product (DLP) for this visit:  433 mGy-cm   This examination, like all CT scans performed in the [facility], was performed utilizing techniques to minimize radiation dose exposure, including the use of iterative reconstruction and automated exposure control  IV Contrast:  100 mL of iohexol (OMNIPAQUE) Enteric Contrast:  1350 cc of low density enteric contrast Robert Mu) was administered  FINDINGS: ENTEROGRAPHY: -Small bowel distension: Adequate  -Bowel: Diseased bowel segments as follows:     -Segments: 2 *  Length/location: Short segment luminal narrowing at the distal ileum/pouch anastomosis measuring 2 3 cm (series 601 image 44)  Luminal narrowing at the pouch anal anastomosis measuring approximately 2 0 cm (series 601 image 103)  These are similar to the prior study  *  Segmental mural hyperenhancement: Stratified (bilaminar or trilaminar)  *  Wall thickening: Mild wall thickening of the distal ileum /pouch consistent with active inflammation similar to prior  *  Stricture: Stricture with moderate to severe upstream dilation (>4 cm) similar to prior  *  Ulcerations: Probable  *  Sacculations: Absent  *  Penetrating complication: None  -Mesenteric findings: Probably reactive mesenteric lymph nodes measuring less than 1 cm in short axis diameter similar to prior  -Extraintestinal findings: Cholelithiasis  APPENDIX:  Surgically absent STOMACH:  Unremarkable  REMAINDER OF THE ABDOMEN AND PELVIS: ABDOMEN LOWER CHEST:  No change in mild right basilar atelectasis/scarring with associated bronchiolectasis  LIVER/BILIARY TREE:  One or more subcentimeter sharply circumscribed low-density hepatic lesion(s) are noted, too small to accurately characterize, but statistically most likely to represent subcentimeter hepatic cysts  Hepatic contours are normal   The liver enhances heterogeneously similar to prior  No biliary dilatation   GALLBLADDER:  Cholelithiasis without pericholecystic inflammatory changes  SPLEEN:  Unremarkable  PANCREAS:  Unremarkable  ADRENAL GLANDS:  Unremarkable  KIDNEYS/URETERS:  Unremarkable  No hydronephrosis  ABDOMINOPELVIC CAVITY:  No ascites  No pneumoperitoneum  VESSELS:  Unremarkable for patient's age  PELVIS REPRODUCTIVE ORGANS:  Unremarkable for patient's age  URINARY BLADDER:  Unremarkable  ABDOMINAL WALL/INGUINAL REGIONS:  Unremarkable  OSSEOUS STRUCTURES:  No acute fracture or destructive osseous lesion  Impression: 1  Mild active inflammation in the distal ileum/pouch with two strictures at the distal ileum /pouch anastomosis and ileoanal anastomosis as above  Severe small bowel dilation throughout the abdomen and pelvis  Findings are similar to prior CT  2   Cholelithiasis   Workstation performed: [id number]

## 2022-03-08 ENCOUNTER — HOSPITAL ENCOUNTER (OUTPATIENT)
Dept: INFUSION CENTER | Facility: CLINIC | Age: 72
Discharge: HOME | End: 2022-03-08
Payer: COMMERCIAL

## 2022-03-08 ENCOUNTER — TELEPHONE (OUTPATIENT)
Dept: HEMATOLOGY ONCOLOGY | Facility: CLINIC | Age: 72
End: 2022-03-08

## 2022-03-08 VITALS
HEART RATE: 92 BPM | RESPIRATION RATE: 18 BRPM | SYSTOLIC BLOOD PRESSURE: 120 MMHG | TEMPERATURE: 97.6 F | DIASTOLIC BLOOD PRESSURE: 77 MMHG

## 2022-03-08 DIAGNOSIS — K50.018 CROHN'S DISEASE OF SMALL INTESTINE WITH OTHER COMPLICATION (HCC): Primary | ICD-10-CM

## 2022-03-08 PROCEDURE — 96413 CHEMO IV INFUSION 1 HR: CPT

## 2022-03-08 RX ORDER — SODIUM CHLORIDE 9 MG/ML
20 INJECTION, SOLUTION INTRAVENOUS ONCE
Status: COMPLETED | OUTPATIENT
Start: 2022-03-08 | End: 2022-03-08

## 2022-03-08 RX ORDER — SODIUM CHLORIDE 9 MG/ML
20 INJECTION, SOLUTION INTRAVENOUS ONCE
Status: CANCELLED | OUTPATIENT
Start: 2022-04-05

## 2022-03-08 RX ORDER — ACETAMINOPHEN 325 MG/1
650 TABLET ORAL ONCE
Status: COMPLETED | OUTPATIENT
Start: 2022-03-08 | End: 2022-03-08

## 2022-03-08 RX ORDER — DIPHENHYDRAMINE HCL 25 MG
25 TABLET ORAL ONCE
Status: CANCELLED | OUTPATIENT
Start: 2022-04-05

## 2022-03-08 RX ORDER — DIPHENHYDRAMINE HCL 25 MG
25 TABLET ORAL ONCE
Status: COMPLETED | OUTPATIENT
Start: 2022-03-08 | End: 2022-03-08

## 2022-03-08 RX ORDER — ACETAMINOPHEN 325 MG/1
650 TABLET ORAL ONCE
Status: CANCELLED | OUTPATIENT
Start: 2022-04-05

## 2022-03-08 RX ADMIN — ACETAMINOPHEN 650 MG: 325 TABLET ORAL at 11:07

## 2022-03-08 RX ADMIN — SODIUM CHLORIDE 20 ML/HR: 9 INJECTION, SOLUTION INTRAVENOUS at 11:05

## 2022-03-08 RX ADMIN — VEDOLIZUMAB 300 MG: 300 INJECTION, POWDER, LYOPHILIZED, FOR SOLUTION INTRAVENOUS at 11:49

## 2022-03-08 RX ADMIN — DIPHENHYDRAMINE HYDROCHLORIDE 25 MG: 25 TABLET ORAL at 11:07

## 2022-03-08 NOTE — PROGRESS NOTES
Pt tolerated todays Entyvio well  No adverse reactions noted  Peripheral iv discontinued jelco intact   Discharged ambulatory with avs

## 2022-03-08 NOTE — TELEPHONE ENCOUNTER
Patient came into office to reschedule appt to later time  Patient rescheduled to fer krishnan 4/22 11am as she would be arianna for dr tinsley and he has nothing available  VM was left for patient with information

## 2022-03-09 ENCOUNTER — TELEPHONE (OUTPATIENT)
Dept: HEMATOLOGY ONCOLOGY | Facility: CLINIC | Age: 72
End: 2022-03-09

## 2022-03-09 NOTE — TELEPHONE ENCOUNTER
Patient called to reschedule appointment on 4/22/2022 to Dr Steffi Patterson  Per notes 3/18 at 12:59PM from [facility], I informed the patient that she has been placed on a list and will be contacted  Dr Steffi Patterson does not have any openings at this time

## 2022-03-29 ENCOUNTER — APPOINTMENT (OUTPATIENT)
Dept: LAB | Facility: CLINIC | Age: 72
End: 2022-03-29
Payer: COMMERCIAL

## 2022-03-29 DIAGNOSIS — E13.69 OTHER SPECIFIED DIABETES MELLITUS WITH OTHER SPECIFIED COMPLICATION, UNSPECIFIED WHETHER LONG TERM INSULIN USE (HCC): ICD-10-CM

## 2022-03-29 DIAGNOSIS — E03.9 HYPOTHYROIDISM, ADULT: ICD-10-CM

## 2022-03-29 DIAGNOSIS — E61.1 LOW IRON: ICD-10-CM

## 2022-03-29 DIAGNOSIS — K50.018 CROHN'S DISEASE OF SMALL INTESTINE WITH OTHER COMPLICATION (HCC): ICD-10-CM

## 2022-03-29 LAB
ALBUMIN SERPL BCP-MCNC: 2.9 G/DL (ref 3.5–5)
ALP SERPL-CCNC: 77 U/L (ref 46–116)
ALT SERPL W P-5'-P-CCNC: 21 U/L (ref 12–78)
ANION GAP SERPL CALCULATED.3IONS-SCNC: 2 MMOL/L (ref 4–13)
AST SERPL W P-5'-P-CCNC: 17 U/L (ref 5–45)
BASOPHILS # BLD AUTO: 0.05 THOUSANDS/ΜL (ref 0–0.1)
BASOPHILS NFR BLD AUTO: 1 % (ref 0–1)
BILIRUB SERPL-MCNC: 0.27 MG/DL (ref 0.2–1)
BUN SERPL-MCNC: 13 MG/DL (ref 5–25)
CALCIUM ALBUM COR SERPL-MCNC: 10.4 MG/DL (ref 8.3–10.1)
CALCIUM SERPL-MCNC: 9.5 MG/DL (ref 8.3–10.1)
CHLORIDE SERPL-SCNC: 103 MMOL/L (ref 100–108)
CHOLEST SERPL-MCNC: 181 MG/DL
CO2 SERPL-SCNC: 31 MMOL/L (ref 21–32)
CREAT SERPL-MCNC: 0.86 MG/DL (ref 0.6–1.3)
EOSINOPHIL # BLD AUTO: 0.13 THOUSAND/ΜL (ref 0–0.61)
EOSINOPHIL NFR BLD AUTO: 3 % (ref 0–6)
ERYTHROCYTE [DISTWIDTH] IN BLOOD BY AUTOMATED COUNT: 13.4 % (ref 11.6–15.1)
EST. AVERAGE GLUCOSE BLD GHB EST-MCNC: 137 MG/DL
FERRITIN SERPL-MCNC: 20 NG/ML (ref 8–388)
GFR SERPL CREATININE-BSD FRML MDRD: 69 ML/MIN/1.73SQ M
GLUCOSE P FAST SERPL-MCNC: 89 MG/DL (ref 65–99)
HBA1C MFR BLD: 6.4 %
HCT VFR BLD AUTO: 40.3 % (ref 34.8–46.1)
HDLC SERPL-MCNC: 121 MG/DL
HGB BLD-MCNC: 12.4 G/DL (ref 11.5–15.4)
IMM GRANULOCYTES # BLD AUTO: 0 THOUSAND/UL (ref 0–0.2)
IMM GRANULOCYTES NFR BLD AUTO: 0 % (ref 0–2)
IRON SATN MFR SERPL: 8 % (ref 15–50)
IRON SERPL-MCNC: 31 UG/DL (ref 50–170)
LDLC SERPL CALC-MCNC: 44 MG/DL (ref 0–100)
LYMPHOCYTES # BLD AUTO: 1.71 THOUSANDS/ΜL (ref 0.6–4.47)
LYMPHOCYTES NFR BLD AUTO: 40 % (ref 14–44)
MCH RBC QN AUTO: 28.2 PG (ref 26.8–34.3)
MCHC RBC AUTO-ENTMCNC: 30.8 G/DL (ref 31.4–37.4)
MCV RBC AUTO: 92 FL (ref 82–98)
MONOCYTES # BLD AUTO: 0.51 THOUSAND/ΜL (ref 0.17–1.22)
MONOCYTES NFR BLD AUTO: 12 % (ref 4–12)
NEUTROPHILS # BLD AUTO: 1.87 THOUSANDS/ΜL (ref 1.85–7.62)
NEUTS SEG NFR BLD AUTO: 44 % (ref 43–75)
NONHDLC SERPL-MCNC: 60 MG/DL
NRBC BLD AUTO-RTO: 0 /100 WBCS
PLATELET # BLD AUTO: 398 THOUSANDS/UL (ref 149–390)
PMV BLD AUTO: 10.9 FL (ref 8.9–12.7)
POTASSIUM SERPL-SCNC: 3.8 MMOL/L (ref 3.5–5.3)
PROT SERPL-MCNC: 7.6 G/DL (ref 6.4–8.2)
RBC # BLD AUTO: 4.4 MILLION/UL (ref 3.81–5.12)
SODIUM SERPL-SCNC: 136 MMOL/L (ref 136–145)
TIBC SERPL-MCNC: 404 UG/DL (ref 250–450)
TRIGL SERPL-MCNC: 82 MG/DL
TSH SERPL DL<=0.05 MIU/L-ACNC: 2.33 UIU/ML (ref 0.36–3.74)
WBC # BLD AUTO: 4.27 THOUSAND/UL (ref 4.31–10.16)

## 2022-03-29 PROCEDURE — 82728 ASSAY OF FERRITIN: CPT

## 2022-03-29 PROCEDURE — 36415 COLL VENOUS BLD VENIPUNCTURE: CPT | Performed by: INTERNAL MEDICINE

## 2022-03-29 PROCEDURE — 84165 PROTEIN E-PHORESIS SERUM: CPT | Performed by: PATHOLOGY

## 2022-03-29 PROCEDURE — 83550 IRON BINDING TEST: CPT

## 2022-03-29 PROCEDURE — 80053 COMPREHEN METABOLIC PANEL: CPT | Performed by: INTERNAL MEDICINE

## 2022-03-29 PROCEDURE — 83036 HEMOGLOBIN GLYCOSYLATED A1C: CPT

## 2022-03-29 PROCEDURE — 83521 IG LIGHT CHAINS FREE EACH: CPT | Performed by: INTERNAL MEDICINE

## 2022-03-29 PROCEDURE — 84165 PROTEIN E-PHORESIS SERUM: CPT | Performed by: INTERNAL MEDICINE

## 2022-03-29 PROCEDURE — 83540 ASSAY OF IRON: CPT

## 2022-03-29 PROCEDURE — 80061 LIPID PANEL: CPT

## 2022-03-29 PROCEDURE — 84443 ASSAY THYROID STIM HORMONE: CPT

## 2022-03-29 PROCEDURE — 85025 COMPLETE CBC W/AUTO DIFF WBC: CPT | Performed by: INTERNAL MEDICINE

## 2022-03-30 LAB
ALBUMIN SERPL ELPH-MCNC: 3.34 G/DL (ref 3.5–5)
ALBUMIN SERPL ELPH-MCNC: 45.1 % (ref 52–65)
ALPHA1 GLOB SERPL ELPH-MCNC: 0.39 G/DL (ref 0.1–0.4)
ALPHA1 GLOB SERPL ELPH-MCNC: 5.3 % (ref 2.5–5)
ALPHA2 GLOB SERPL ELPH-MCNC: 0.98 G/DL (ref 0.4–1.2)
ALPHA2 GLOB SERPL ELPH-MCNC: 13.3 % (ref 7–13)
BETA GLOB ABNORMAL SERPL ELPH-MCNC: 0.48 G/DL (ref 0.4–0.8)
BETA1 GLOB SERPL ELPH-MCNC: 6.5 % (ref 5–13)
BETA2 GLOB SERPL ELPH-MCNC: 4 % (ref 2–8)
BETA2+GAMMA GLOB SERPL ELPH-MCNC: 0.3 G/DL (ref 0.2–0.5)
GAMMA GLOB ABNORMAL SERPL ELPH-MCNC: 1.91 G/DL (ref 0.5–1.6)
GAMMA GLOB SERPL ELPH-MCNC: 25.8 % (ref 12–22)
IGG/ALB SER: 0.82 {RATIO} (ref 1.1–1.8)
KAPPA LC FREE SER-MCNC: 28.8 MG/L (ref 3.3–19.4)
KAPPA LC FREE/LAMBDA FREE SER: 0.71 {RATIO} (ref 0.26–1.65)
LAMBDA LC FREE SERPL-MCNC: 40.5 MG/L (ref 5.7–26.3)
M PROTEIN 1 MFR SERPL ELPH: 14.5 %
M PROTEIN 1 SERPL ELPH-MCNC: 1.07 G/DL
PROT PATTERN SERPL ELPH-IMP: ABNORMAL
PROT SERPL-MCNC: 7.4 G/DL (ref 6.4–8.2)

## 2022-04-05 ENCOUNTER — HOSPITAL ENCOUNTER (OUTPATIENT)
Dept: INFUSION CENTER | Facility: CLINIC | Age: 72
Discharge: HOME | End: 2022-04-05
Payer: COMMERCIAL

## 2022-04-05 VITALS
RESPIRATION RATE: 18 BRPM | TEMPERATURE: 98.2 F | DIASTOLIC BLOOD PRESSURE: 64 MMHG | SYSTOLIC BLOOD PRESSURE: 149 MMHG | HEART RATE: 71 BPM

## 2022-04-05 DIAGNOSIS — K50.018 CROHN'S DISEASE OF SMALL INTESTINE WITH OTHER COMPLICATION (HCC): Primary | ICD-10-CM

## 2022-04-05 PROCEDURE — 96413 CHEMO IV INFUSION 1 HR: CPT

## 2022-04-05 RX ORDER — DIPHENHYDRAMINE HCL 25 MG
25 TABLET ORAL ONCE
Status: CANCELLED | OUTPATIENT
Start: 2022-05-03

## 2022-04-05 RX ORDER — DIPHENHYDRAMINE HCL 25 MG
25 TABLET ORAL ONCE
Status: COMPLETED | OUTPATIENT
Start: 2022-04-05 | End: 2022-04-05

## 2022-04-05 RX ORDER — ACETAMINOPHEN 325 MG/1
650 TABLET ORAL ONCE
Status: COMPLETED | OUTPATIENT
Start: 2022-04-05 | End: 2022-04-05

## 2022-04-05 RX ORDER — SODIUM CHLORIDE 9 MG/ML
20 INJECTION, SOLUTION INTRAVENOUS ONCE
Status: CANCELLED | OUTPATIENT
Start: 2022-05-03

## 2022-04-05 RX ORDER — ACETAMINOPHEN 325 MG/1
650 TABLET ORAL ONCE
Status: CANCELLED | OUTPATIENT
Start: 2022-05-03

## 2022-04-05 RX ORDER — SODIUM CHLORIDE 9 MG/ML
20 INJECTION, SOLUTION INTRAVENOUS ONCE
Status: COMPLETED | OUTPATIENT
Start: 2022-04-05 | End: 2022-04-05

## 2022-04-05 RX ADMIN — SODIUM CHLORIDE 20 ML/HR: 0.9 INJECTION, SOLUTION INTRAVENOUS at 12:19

## 2022-04-05 RX ADMIN — DIPHENHYDRAMINE HYDROCHLORIDE 25 MG: 25 TABLET ORAL at 12:18

## 2022-04-05 RX ADMIN — ACETAMINOPHEN 650 MG: 325 TABLET ORAL at 12:18

## 2022-04-05 RX ADMIN — VEDOLIZUMAB 300 MG: 300 INJECTION, POWDER, LYOPHILIZED, FOR SOLUTION INTRAVENOUS at 13:08

## 2022-04-05 NOTE — PROGRESS NOTES
Pt here for Entyvio infusion  IV started by [facility] after multiple attempts  Pt resting comfortably and has no further questions or concerns  Call bell with in reach

## 2022-04-05 NOTE — PROGRESS NOTES
Pt tolerated treatment well with no complications  IV removed and gauze AVS printed and given to pt

## 2022-04-21 NOTE — PROGRESS NOTES
HEMATOLOGY CLINIC NOTE    Primary Care Provider: Modena Ahumada, MD  Referring Provider: Margot Oneil  MRN: [id number]  : 1950    Assessment / Plan:   1  Smoldering multiple myeloma (SMM)  This is a 68-year-old female who presented to our clinic 2021 for iron deficiency anemia  It was found that she also had elevated protein despite low albumin, hypercalcemia, elevated alk-phos  She was worked up for possible plasma neoplasm  SPEP M spike was found IgG lambda at 1 03  Bone survey showed no bone lesions in 2021  A bone marrow biopsy was obtained showing smoldering multiple myeloma plasma cell neoplasm 15%  She is currently on observation  Last lab work 2022:  Hemoglobin 12 4, MCV 92  Patient's SPEP showed M spike 1 07  [place] free light chain 28 8, lambda free light chain 40 5, kappa/lambda ratio 0 71  Corrected calcium 10 4, alk-phos 77, total protein 7 6  Will continue to monitor patient's smoldering multiple myeloma with lab work including serum free light chain, SPEP, CBC, CMP in 6 months with a follow-up then  I did educate her on signs and symptoms of concern including constitutional signs/symptoms, bone pain to inform us in the interim  - CBC and differential; Future  - Comprehensive metabolic panel; Future  - Protein electrophoresis, serum; Future  - Immunoglobulin free LT chains blood; Future    2  Low iron  Patient has history of iron deficiency anemia/iron deficiency without anemia secondary to her Crohn's disease  She still has intermittent GI bleeding  She states this has been better controlled lately  She is currently taking once daily ferrous sulfate 325 mg without GI upset  As above, she is not anemic  However, 3/2022 --> Ferritin 20, iron 31, TIBC 404, iron sat 8%  We will provide patient Venofer 300 mg x 3  She did receive Venofer 300 mg x 2 2021 and tolerated this well without side effect    She will have repeat labs Q3 months including CBC, iron panel     - CBC and differential; Standing  - Iron Panel (Includes Ferritin, Iron Sat%, Iron, and TIBC); Standing    3  Crohn's disease of small intestine with other complication ([address] )  As above  Patient was on Vedolizumab infusions once monthly  However, this has been discontinued as of this month  Patient is set to get a 2nd opinion at a larger institution in [facility]  4  Reactive thrombocytosis  Patient has reactive thrombocytosis likely from iron deficiency anemia  There is also possible component from Crohn's disease  Follow up in 6 months     Patient voiced understanding and agreement to the above  The patient knows to call the office with any questions or concerns regarding the above  I have spent 30 minutes with Patient  today in which greater than 50% of this time was spent in counseling/coordination of care regarding Diagnostic results, Risks and benefits of tx options, Intructions for management, Patient and family education, Importance of tx compliance, Risk factor reductions and Impressions  Reason for visit:       Chief Complaint   Patient presents with    Follow-up     6 month        History of Hematology Illness:     Marie Hernandez is a 73 y o  female who came in for follow up  1  Smoldering Multiple Myeloma, IgG Lambda  - 8/2021 SPEP --> M spike 1 03  K/L ratio 0 78  SPEP ordered because of high total protein at 9 2, albumin 3 2  Corrected calcium 10 4, alk phos 132  PTH WNL  Bone survey negative for lytic lesions  Immunoglobulin free light chain showed normal kappa/lambda ratio  Prompted BMBx --> 10/2021 --> BMBx --> 15% plasma cell neoplasm found with 15% lambda restricted plasma cells in normocellular marrow (40% cellularity)       2   History of Iron Deficiency Anemia   - secondary to Chronic GI bleeding, malabsorption from Crohn's   - 8/18/2021: Hgb 13 7g/dL, MCV 87    -8/27/2021: Ferritin 20, iron 55, TIBC 446, iron sat 12%  - 2019: last colonoscopy unremarkable per pt  - 9/2021: S/P Venofer 300mg x2  - 3/2022: Hgb 12 4g/dL, MCV 92  Ferritin 20, iron 31, TIBC 404, iron sat 8%    3  Crohn's Disease  - was being treated with Vedolizumab until 4/2022       4  Mild Reactive Thrombocytosis  - since at least 8/2020  - ranges in high 300s-400s  - likely reactive due to iron deficiency + inflammation from Crohn's disease    Interval History:   08/27/21: This is a 78year old female with PMH of Crohn's disease, DM, COPD, Asthma, ocular hypertension presenting for consultation       Patient states she has fatigue, PICA for ice, frequent bleeding from the rectum typical for her Crohn's disease   No shortness of breath, no lightheadedness   No constitutional symptoms such as fever, chills, night sweats, lumps, bumps, sudden weight loss   No abdominal pain   Patient does state she has bone pain in her right femur   Not joint related      Patient's last colonoscopy was 2019, she is due for 1 soon   She has had Crohn's disease since the 90s   No menstrual bleeding or bleeding into urine   Diet is well balanced   No other malabsorption of conditions   She currently takes B12       She has never had IV iron before or blood transfusion   She currently takes oral iron   Does not smoke or drink  Mario Osullivan personal history of cancer   No family history of cancer  Juju Ramírez is up-to-date on her cancer screenings      9/17/2021:  Patient came in for follow-up  Denies any complaints  She does not have any constitutional symptoms at this time  Still has some lingering bone pain      "10/22/2021:  Came for follow-up doing okay body weight stable  No bone pain, bleeding easy bruise no other complaints"    4/22/2022:  Patient came in for follow-up  She offers no specific complaints  States she has no constitutional symptoms such as fever, chills, night sweats, lumps, bumps, sudden weight loss  She has no bone pain  Does have joint pain in bilateral knees which is chronic    She has a very good appetite and has actually gained a little bit of weight  She was receiving infusion treatment for Crohn's disease  However, this was stopped earlier this month  Problem list:       Patient Active Problem List   Diagnosis    Asthma    COPD [facility]chronic obstructive pulmonary disease) ([facility] )    Diabetes mellitus ([facility] )    Ocular hypertension    Crohn's disease of small intestine with other complication ([facility] )    Low iron    Protein-calorie malnutrition, unspecified severity ([place] [address] )       REVIEW OF SYMPTOMS:   Review of Systems   Constitutional: Negative for activity change, appetite change, chills, diaphoresis, fatigue, fever and unexpected weight change  HENT: Negative for nosebleeds  Eyes: Negative for visual disturbance  Respiratory: Negative for cough and shortness of breath  Cardiovascular: Negative for chest pain, palpitations and leg swelling  Gastrointestinal: Positive for blood in stool (Intermittent due to Crohn's disease)  Negative for abdominal pain, anal bleeding, constipation, diarrhea, nausea and vomiting  Endocrine: Negative for cold intolerance  Genitourinary: Negative for hematuria, menstrual problem and vaginal bleeding  Musculoskeletal: Negative for arthralgias  Skin: Negative for color change, pallor and rash  Neurological: Negative for dizziness, syncope, light-headedness and headaches  Hematological: Negative for adenopathy  Does not bruise/bleed easily  Psychiatric/Behavioral: Negative for sleep disturbance  PHYSICAL EXAMINATION:     Vital Signs:   /82 (BP Location: Left arm, Patient Position: Sitting)   Pulse 75   Resp 14   Ht 5' 3" (1 6 m)   Wt 54 4 kg (120 lb)   SpO2 98%   BMI 21 26 kg/m²   Body surface area is 1 56 meters squared     Ht Readings from Last 8 Encounters:   04/22/22 5' 3" (1 6 m)   03/01/22 5' 3" (1 6 m)   01/04/22 5' 3" (1 6 m)   12/10/21 5' 3" (1 6 m)   10/22/21 5' 3" (1 6 m)   10/13/21 5' 3" (1 6 m)   09/17/21 5' 3" (1 6 m) 08/27/21 5' 3" (1 6 m)       Wt Readings from Last 8 Encounters:   04/22/22 54 4 kg (120 lb)   03/01/22 54 3 kg (119 lb 12 8 oz)   01/04/22 51 6 kg (113 lb 12 8 oz)   12/14/21 48 8 kg (107 lb 9 4 oz)   12/10/21 49 3 kg (108 lb 9 6 oz)   10/22/21 51 3 kg (113 lb)   10/13/21 50 8 kg (112 lb)   09/17/21 53 3 kg (117 lb 8 oz)          Physical Exam  Constitutional:       General: She is not in acute distress  Appearance: Normal appearance  She is not ill-appearing, toxic-appearing or diaphoretic  HENT:      Head: Normocephalic and atraumatic  Eyes:      General: No scleral icterus  Extraocular Movements: Extraocular movements intact  Conjunctiva/sclera: Conjunctivae normal       Pupils: Pupils are equal, round, and reactive to light  Cardiovascular:      Rate and Rhythm: Normal rate and regular rhythm  Heart sounds: Normal heart sounds  No murmur heard  Pulmonary:      Effort: Pulmonary effort is normal  No respiratory distress  Breath sounds: Normal breath sounds  No stridor  No wheezing, rhonchi or rales  Abdominal:      Palpations: Abdomen is soft  Tenderness: There is no abdominal tenderness  Musculoskeletal:         General: No tenderness  Normal range of motion  Cervical back: Normal range of motion and neck supple  Right lower leg: No edema  Left lower leg: No edema  Lymphadenopathy:      Cervical: No cervical adenopathy  Skin:     General: Skin is warm and dry  Coloration: Skin is not jaundiced or pale  Findings: No bruising, erythema, lesion or rash  Neurological:      General: No focal deficit present  Mental Status: She is alert and oriented to person, place, and time  Mental status is at baseline  Cranial Nerves: No cranial nerve deficit  Motor: No weakness  Psychiatric:         Mood and Affect: Mood normal          Behavior: Behavior normal          Thought Content:  Thought content normal          Judgment: Judgment normal        Reviewed historical information        PAST MEDICAL HISTORY:    Past Medical History:   Diagnosis Date    Asthma     COPD [facility]chronic obstructive pulmonary disease) ([facility] )     Crohn's disease ([facility] )     Diabetes mellitus ([facility] )     Hypertension     Multiple myeloma ([facility] )     Ocular hypertension        PAST SURGICAL HISTORY:    Past Surgical History:   Procedure Laterality Date    CATARACT EXTRACTION      COLON SURGERY      Partial colectomy, partial removal of the rectum    EXPLORATORY LAPAROTOMY      ILEOSTOMY      IR BIOPSY BONE MARROW  10/13/2021         CURRENT MEDICATIONS:     Current Outpatient Medications:     albuterol (PROVENTIL HFA,VENTOLIN HFA) 90 mcg/act inhaler, Inhale, Disp: , Rfl:     Ascorbic Acid (Vitamin C) 500 MG CAPS, Take by mouth, Disp: , Rfl:     bimatoprost (LUMIGAN) 0 01 % ophthalmic drops, Administer 1 drop to both eyes daily at bedtime, Disp: , Rfl:     Cyanocobalamin (B-12) 5000 MCG CAPS, Take by mouth, Disp: , Rfl:     dorzolamide-timolol (COSOPT) 22 3-6 8 MG/ML ophthalmic solution, 1 drop 2 times daily- 8 am and 8 pm- both eyes, Disp: , Rfl:     Entyvio SOLR, 300mg IV on week 0, 2, 6 and then every 8 weeks, Disp: 1 each, Rfl: 6    famotidine (PEPCID) 40 MG tablet, , Disp: , Rfl:     ferrous sulfate 325 (65 FE) MG EC tablet, Take 325 mg by mouth 3 (three) times a day with meals, Disp: , Rfl:     fluticasone (FLONASE) 50 mcg/act nasal spray, , Disp: , Rfl:     Folic Acid 20 MG CAPS, Take by mouth  , Disp: , Rfl:     ipratropium (ATROVENT) 0 03 % nasal spray,  , Disp: , Rfl:     levothyroxine 25 mcg tablet, Take by mouth, Disp: , Rfl:     loperamide (IMODIUM) 2 mg capsule, Take 2 mg by mouth 4 (four) times a day as needed for diarrhea, Disp: , Rfl:     mometasone (NASONEX) 50 mcg/act nasal spray, 2 sprays into each nostril, Disp: , Rfl:     mometasone-formoterol (DULERA) 200-5 MCG/ACT inhaler, Inhale, Disp: , Rfl:     Multiple Vitamins-Minerals (OCUTABS-LUTEIN) TABS, Take by mouth, Disp: , Rfl:     Nebulizers (Nebulizer Compressor) MISC, Inhale, Disp: , Rfl:     omeprazole (PriLOSEC) 20 mg delayed release capsule, , Disp: , Rfl:     ondansetron (ZOFRAN) 4 mg tablet, Take 1 tablet (4 mg total) by mouth every 8 (eight) hours as needed for nausea or vomiting, Disp: 60 tablet, Rfl: 0    potassium chloride (KLOR-CON M20) 20 mEq tablet, Take 20 mEq by mouth, Disp: , Rfl:     simethicone (MYLICON) 80 mg chewable tablet, Chew 80 mg  , Disp: , Rfl:     timolol (TIMOPTIC) 0 25 % ophthalmic solution, , Disp: , Rfl:     vedolizumab (Entyvio) SOLR, Infuse 300 mg into a venous catheter every 56 days, Disp: 1 each, Rfl: 6    Zinc 50 MG CAPS, Take by mouth, Disp: , Rfl:     Family History   Social History     Tobacco Use    Smoking status: Never Smoker    Smokeless tobacco: Never Used   Vaping Use    Vaping Use: Never used   Substance Use Topics    Alcohol use: No    Drug use: No   te    WBC 4 27 (L) 0  Family History   Problem Relation Age of Onset    Diabetes Mother     Hypertension Mother    3/29/2022    HGB 12 4 03/29/2022    HCT 40 3 03/29/2022    MCV 92 03/29/2022     (H) 03/29/2022   sults   Component Value Date    WBC 4 27 (L) 03/29/2022    HGB 12 4 03/29/2022    HCT 40 3 03/29/2022    MCV 92 03/29/2022     (H) 03/29/2022      Component Value Date    SODIUM 136 03/29/2022    K 3 8 03/29/2022     03/29/2022    CO2 31 03/29/2022    AGAP 2 (L) 03/29/2022    BUN 13 03/29/2022    CREATININE 0 86 03/29/2022    GLUC 90 11/06/2020    GLUF 89 03/29/2022    CALCIUM 9 5 03/29/2022    AST 17 03/29/2022    ALT 21 03/29/2022    ALKPHOS 77 03/29/2022    TP 7 6 03/29/2022    TP 7 4 03/29/2022    TBILI 0 27 03/29/2022    EGFR 69 03/29/2022       IMAGING:  CT small bowel enterography  Narrative: CT ABDOMEN AND PELVIS WITH IV CONTRAST- ENTEROGRAPHY - WITH CONTRAST    INDICATION:   [id number]: Crohn's disease of small intestine with unspecified complications  COMPARISON:  CT abdomen pelvis 8/31/2020    TECHNIQUE:  Contrast-enhanced CT examination of the abdomen and pelvis was performed utilizing thin section technique and after the administration of low density enteric contrast according to protocol designed specifically to obtain sensitive evaluation   of the small bowel  Axial, sagittal, and coronal 2D reformatted images were created from the source data and submitted for interpretation  Radiation dose length product (DLP) for this visit:  433 mGy-cm   This examination, like all CT scans performed in the [facility], was performed utilizing techniques to minimize radiation dose exposure, including the use of iterative   reconstruction and automated exposure control  IV Contrast:  100 mL of iohexol (OMNIPAQUE)  Enteric Contrast:  1350 cc of low density enteric contrast [facility]) was administered  FINDINGS:     ENTEROGRAPHY:  -Small bowel distension: Adequate  -Bowel: Diseased bowel segments as follows:      -Segments: 2  *  Length/location: Short segment luminal narrowing at the distal ileum/pouch anastomosis measuring 2 3 cm (series 601 image 44)  Luminal narrowing at the pouch anal anastomosis measuring approximately 2 0 cm (series 601 image 103)  These are similar   to the prior study  *  Segmental mural hyperenhancement: Stratified (bilaminar or trilaminar)  *  Wall thickening: Mild wall thickening of the distal ileum /pouch consistent with active inflammation similar to prior  *  Stricture: Stricture with moderate to severe upstream dilation (>4 cm) similar to prior  *  Ulcerations: Probable  *  Sacculations: Absent  *  Penetrating complication: None  -Mesenteric findings: Probably reactive mesenteric lymph nodes measuring less than 1 cm in short axis diameter similar to prior   -Extraintestinal findings: Cholelithiasis  APPENDIX:  Surgically absent    STOMACH:  Unremarkable      REMAINDER OF THE ABDOMEN AND PELVIS:    ABDOMEN    LOWER CHEST:  No change in mild right basilar atelectasis/scarring with associated bronchiolectasis  LIVER/BILIARY TREE:  One or more subcentimeter sharply circumscribed low-density hepatic lesion(s) are noted, too small to accurately characterize, but statistically most likely to represent subcentimeter hepatic cysts  Hepatic contours are normal     The liver enhances heterogeneously similar to prior  No biliary dilatation  GALLBLADDER:  Cholelithiasis without pericholecystic inflammatory changes  SPLEEN:  Unremarkable  PANCREAS:  Unremarkable  ADRENAL GLANDS:  Unremarkable  KIDNEYS/URETERS:  Unremarkable  No hydronephrosis  ABDOMINOPELVIC CAVITY:  No ascites  No pneumoperitoneum  VESSELS:  Unremarkable for patient's age  PELVIS    REPRODUCTIVE ORGANS:  Unremarkable for patient's age  URINARY BLADDER:  Unremarkable  ABDOMINAL WALL/INGUINAL REGIONS:  Unremarkable  OSSEOUS STRUCTURES:  No acute fracture or destructive osseous lesion  Impression: 1  Mild active inflammation in the distal ileum/pouch with two strictures at the distal ileum /pouch anastomosis and ileoanal anastomosis as above  Severe small bowel dilation throughout the abdomen and pelvis  Findings are similar to prior CT  2   Cholelithiasis      Workstation performed: [id number]

## 2022-04-22 ENCOUNTER — TELEPHONE (OUTPATIENT)
Dept: HEMATOLOGY ONCOLOGY | Facility: CLINIC | Age: 72
End: 2022-04-22

## 2022-04-22 ENCOUNTER — OFFICE VISIT (OUTPATIENT)
Dept: HEMATOLOGY ONCOLOGY | Facility: CLINIC | Age: 72
End: 2022-04-22
Payer: COMMERCIAL

## 2022-04-22 VITALS
DIASTOLIC BLOOD PRESSURE: 82 MMHG | SYSTOLIC BLOOD PRESSURE: 130 MMHG | HEART RATE: 75 BPM | OXYGEN SATURATION: 98 % | WEIGHT: 120 LBS | RESPIRATION RATE: 14 BRPM | BODY MASS INDEX: 21.26 KG/M2 | HEIGHT: 63 IN

## 2022-04-22 DIAGNOSIS — K50.018 CROHN'S DISEASE OF SMALL INTESTINE WITH OTHER COMPLICATION (HCC): ICD-10-CM

## 2022-04-22 DIAGNOSIS — E61.1 LOW IRON: Primary | ICD-10-CM

## 2022-04-22 DIAGNOSIS — D47.2 SMOLDERING MULTIPLE MYELOMA (SMM): Primary | ICD-10-CM

## 2022-04-22 DIAGNOSIS — E61.1 LOW IRON: ICD-10-CM

## 2022-04-22 DIAGNOSIS — D75.838 REACTIVE THROMBOCYTOSIS: ICD-10-CM

## 2022-04-22 PROCEDURE — 99214 OFFICE O/P EST MOD 30 MIN: CPT | Performed by: INTERNAL MEDICINE

## 2022-04-22 RX ORDER — SODIUM CHLORIDE 9 MG/ML
20 INJECTION, SOLUTION INTRAVENOUS ONCE
Status: CANCELLED | OUTPATIENT
Start: 2022-04-28

## 2022-04-22 RX ORDER — SODIUM CHLORIDE 9 MG/ML
20 INJECTION, SOLUTION INTRAVENOUS ONCE
Status: CANCELLED | OUTPATIENT
Start: 2022-04-25

## 2022-04-22 NOTE — TELEPHONE ENCOUNTER
Deven Whitfield, please see note below  Please change dates as requested  Spoke to patient  She is aware and okay with all appt dates  [facility] has been scheduled for all appts

## 2022-04-22 NOTE — TELEPHONE ENCOUNTER
Venofer 300mg x3  - labs in 3m   While we try to accommodate patient requests, our priority is to schedule treatment according to Doctor's orders and site availability  1  Does the Provider use the intake sheet or checkout note? check out note  2    3  What would be a preferred day of the week that would work best for your infusion appointment? Tuesday or Thursday  4    5  Do you prefer mornings or afternoons for your appointments? Mornings before 11am    We are going to try our best to schedule you at the infusion center closest to your home  In the event that we are unable to what would be your next preferred infusion site or sites? 1 [facility]    2    3    6  Do you have transportation to take you to all of your appointments?  No  7    8  Would you like the infusion center to draw labs from your port? (disregard if patient doesn't have a port or need labs for infusion appointment)

## 2022-04-28 ENCOUNTER — HOSPITAL ENCOUNTER (OUTPATIENT)
Dept: INFUSION CENTER | Facility: CLINIC | Age: 72
Discharge: HOME | End: 2022-04-28
Payer: COMMERCIAL

## 2022-04-28 VITALS
OXYGEN SATURATION: 99 % | TEMPERATURE: 97.1 F | HEART RATE: 73 BPM | RESPIRATION RATE: 15 BRPM | DIASTOLIC BLOOD PRESSURE: 68 MMHG | SYSTOLIC BLOOD PRESSURE: 140 MMHG

## 2022-04-28 DIAGNOSIS — K50.018 CROHN'S DISEASE OF SMALL INTESTINE WITH OTHER COMPLICATION (HCC): ICD-10-CM

## 2022-04-28 DIAGNOSIS — E61.1 LOW IRON: Primary | ICD-10-CM

## 2022-04-28 PROCEDURE — 96365 THER/PROPH/DIAG IV INF INIT: CPT

## 2022-04-28 RX ORDER — SODIUM CHLORIDE 9 MG/ML
20 INJECTION, SOLUTION INTRAVENOUS ONCE
Status: COMPLETED | OUTPATIENT
Start: 2022-04-28 | End: 2022-04-28

## 2022-04-28 RX ORDER — SODIUM CHLORIDE 9 MG/ML
20 INJECTION, SOLUTION INTRAVENOUS ONCE
Status: CANCELLED | OUTPATIENT
Start: 2022-05-01

## 2022-04-28 RX ADMIN — IRON SUCROSE 300 MG: 20 INJECTION, SOLUTION INTRAVENOUS at 08:28

## 2022-04-28 RX ADMIN — SODIUM CHLORIDE 20 ML/HR: 9 INJECTION, SOLUTION INTRAVENOUS at 08:28

## 2022-04-28 NOTE — PLAN OF CARE
Problem: HEMATOLOGIC - ADULT  Goal: Maintains hematologic stability  Description: INTERVENTIONS  - Administer Venofer as ordered  Outcome: Progressing

## 2022-05-02 ENCOUNTER — TELEPHONE (OUTPATIENT)
Dept: HEMATOLOGY ONCOLOGY | Facility: CLINIC | Age: 72
End: 2022-05-02

## 2022-05-02 NOTE — TELEPHONE ENCOUNTER
Patient called to confirm transportation for her infusion tomorrow 5/3 @ 2pm at the [facility] infusion center  Per Dg Sy still has to do the schedule and then will confiim via e-mail

## 2022-05-03 ENCOUNTER — HOSPITAL ENCOUNTER (OUTPATIENT)
Dept: INFUSION CENTER | Facility: CLINIC | Age: 72
Discharge: HOME | End: 2022-05-03
Payer: COMMERCIAL

## 2022-05-03 VITALS
SYSTOLIC BLOOD PRESSURE: 129 MMHG | DIASTOLIC BLOOD PRESSURE: 74 MMHG | HEART RATE: 70 BPM | RESPIRATION RATE: 18 BRPM | TEMPERATURE: 97.6 F

## 2022-05-03 DIAGNOSIS — E61.1 LOW IRON: Primary | ICD-10-CM

## 2022-05-03 DIAGNOSIS — K50.018 CROHN'S DISEASE OF SMALL INTESTINE WITH OTHER COMPLICATION (HCC): ICD-10-CM

## 2022-05-03 DIAGNOSIS — K50.018 CROHN'S DISEASE OF SMALL INTESTINE WITH OTHER COMPLICATION (HCC): Primary | ICD-10-CM

## 2022-05-03 PROCEDURE — 96413 CHEMO IV INFUSION 1 HR: CPT

## 2022-05-03 RX ORDER — DIPHENHYDRAMINE HCL 25 MG
25 TABLET ORAL ONCE
Status: CANCELLED | OUTPATIENT
Start: 2022-05-31

## 2022-05-03 RX ORDER — DIPHENHYDRAMINE HCL 25 MG
25 TABLET ORAL ONCE
Status: COMPLETED | OUTPATIENT
Start: 2022-05-03 | End: 2022-05-03

## 2022-05-03 RX ORDER — ACETAMINOPHEN 325 MG/1
650 TABLET ORAL ONCE
Status: COMPLETED | OUTPATIENT
Start: 2022-05-03 | End: 2022-05-03

## 2022-05-03 RX ORDER — SODIUM CHLORIDE 9 MG/ML
20 INJECTION, SOLUTION INTRAVENOUS ONCE
Status: CANCELLED | OUTPATIENT
Start: 2022-05-31

## 2022-05-03 RX ORDER — SODIUM CHLORIDE 9 MG/ML
20 INJECTION, SOLUTION INTRAVENOUS ONCE
Status: COMPLETED | OUTPATIENT
Start: 2022-05-03 | End: 2022-05-03

## 2022-05-03 RX ORDER — ACETAMINOPHEN 325 MG/1
650 TABLET ORAL ONCE
Status: CANCELLED | OUTPATIENT
Start: 2022-05-31

## 2022-05-03 RX ORDER — SODIUM CHLORIDE 9 MG/ML
20 INJECTION, SOLUTION INTRAVENOUS ONCE
Status: CANCELLED | OUTPATIENT
Start: 2022-05-12

## 2022-05-03 RX ADMIN — VEDOLIZUMAB 300 MG: 300 INJECTION, POWDER, LYOPHILIZED, FOR SOLUTION INTRAVENOUS at 14:47

## 2022-05-03 RX ADMIN — DIPHENHYDRAMINE HYDROCHLORIDE 25 MG: 25 TABLET ORAL at 14:14

## 2022-05-03 RX ADMIN — ACETAMINOPHEN 650 MG: 325 TABLET ORAL at 14:14

## 2022-05-03 RX ADMIN — SODIUM CHLORIDE 20 ML/HR: 0.9 INJECTION, SOLUTION INTRAVENOUS at 14:16

## 2022-05-03 NOTE — PROGRESS NOTES
Pt presents for entiyvio infusion offering no compliants  Pt stated no recent infection or antibiotic treatment  Pt tolerated treatment without incident  PIV removed  AVS printed  Next appointment reviewed

## 2022-05-12 ENCOUNTER — HOSPITAL ENCOUNTER (OUTPATIENT)
Dept: INFUSION CENTER | Facility: CLINIC | Age: 72
Discharge: HOME | End: 2022-05-12
Payer: COMMERCIAL

## 2022-05-12 VITALS
RESPIRATION RATE: 16 BRPM | HEART RATE: 84 BPM | DIASTOLIC BLOOD PRESSURE: 55 MMHG | SYSTOLIC BLOOD PRESSURE: 111 MMHG | TEMPERATURE: 97.8 F

## 2022-05-12 DIAGNOSIS — E61.1 LOW IRON: Primary | ICD-10-CM

## 2022-05-12 DIAGNOSIS — K50.018 CROHN'S DISEASE OF SMALL INTESTINE WITH OTHER COMPLICATION (HCC): ICD-10-CM

## 2022-05-12 PROCEDURE — 96365 THER/PROPH/DIAG IV INF INIT: CPT

## 2022-05-12 RX ORDER — SODIUM CHLORIDE 9 MG/ML
20 INJECTION, SOLUTION INTRAVENOUS ONCE
Status: CANCELLED | OUTPATIENT
Start: 2022-05-19

## 2022-05-12 RX ORDER — SODIUM CHLORIDE 9 MG/ML
20 INJECTION, SOLUTION INTRAVENOUS ONCE
Status: COMPLETED | OUTPATIENT
Start: 2022-05-12 | End: 2022-05-12

## 2022-05-12 RX ADMIN — IRON SUCROSE 300 MG: 20 INJECTION, SOLUTION INTRAVENOUS at 08:17

## 2022-05-12 RX ADMIN — SODIUM CHLORIDE 20 ML/HR: 9 INJECTION, SOLUTION INTRAVENOUS at 08:17

## 2022-05-12 NOTE — PROGRESS NOTES
Pt presents for venofer infusion offering no complaints  Pt tolerated treatment without incident  AVS declined, next appointment reviewed

## 2022-05-19 ENCOUNTER — HOSPITAL ENCOUNTER (OUTPATIENT)
Dept: INFUSION CENTER | Facility: CLINIC | Age: 72
Discharge: HOME | End: 2022-05-19
Payer: COMMERCIAL

## 2022-05-19 VITALS
RESPIRATION RATE: 17 BRPM | HEART RATE: 66 BPM | TEMPERATURE: 97.1 F | SYSTOLIC BLOOD PRESSURE: 105 MMHG | DIASTOLIC BLOOD PRESSURE: 62 MMHG

## 2022-05-19 DIAGNOSIS — K50.018 CROHN'S DISEASE OF SMALL INTESTINE WITH OTHER COMPLICATION (HCC): ICD-10-CM

## 2022-05-19 DIAGNOSIS — E61.1 LOW IRON: Primary | ICD-10-CM

## 2022-05-19 PROCEDURE — 96365 THER/PROPH/DIAG IV INF INIT: CPT

## 2022-05-19 RX ORDER — ATORVASTATIN CALCIUM 10 MG/1
10 TABLET, FILM COATED ORAL DAILY
COMMUNITY

## 2022-05-19 RX ORDER — SODIUM CHLORIDE 9 MG/ML
20 INJECTION, SOLUTION INTRAVENOUS ONCE
Status: CANCELLED | OUTPATIENT
Start: 2022-05-19

## 2022-05-19 RX ORDER — SODIUM CHLORIDE 9 MG/ML
20 INJECTION, SOLUTION INTRAVENOUS ONCE
Status: COMPLETED | OUTPATIENT
Start: 2022-05-19 | End: 2022-05-19

## 2022-05-19 RX ADMIN — SODIUM CHLORIDE 20 ML/HR: 9 INJECTION, SOLUTION INTRAVENOUS at 09:15

## 2022-05-19 RX ADMIN — SODIUM CHLORIDE 300 MG: 0.9 INJECTION, SOLUTION INTRAVENOUS at 09:15

## 2022-05-19 NOTE — PROGRESS NOTES
Patient presents for venofer offering no complaints, patient tolerated treatment without incident  AVS declined  Next appointment reviewed

## 2022-05-24 ENCOUNTER — TELEMEDICINE (OUTPATIENT)
Dept: GASTROENTEROLOGY | Facility: MEDICAL CENTER | Age: 72
End: 2022-05-24
Payer: COMMERCIAL

## 2022-05-24 DIAGNOSIS — R63.4 WEIGHT LOSS: ICD-10-CM

## 2022-05-24 DIAGNOSIS — D84.9 IMMUNOCOMPROMISED PATIENT (HCC): ICD-10-CM

## 2022-05-24 DIAGNOSIS — K50.018 CROHN'S DISEASE OF SMALL INTESTINE WITH OTHER COMPLICATION (HCC): Primary | ICD-10-CM

## 2022-05-24 DIAGNOSIS — D50.9 IRON DEFICIENCY ANEMIA, UNSPECIFIED IRON DEFICIENCY ANEMIA TYPE: ICD-10-CM

## 2022-05-24 DIAGNOSIS — R19.7 DIARRHEA, UNSPECIFIED TYPE: ICD-10-CM

## 2022-05-24 DIAGNOSIS — R10.84 GENERALIZED ABDOMINAL PAIN: ICD-10-CM

## 2022-05-24 PROCEDURE — 99215 OFFICE O/P EST HI 40 MIN: CPT | Performed by: INTERNAL MEDICINE

## 2022-05-24 NOTE — PROGRESS NOTES
Virtual Regular Visit    Verification of patient location:    Patient is located in the following state in which I hold an active license [place]      Assessment/Plan:  44-year-old woman with smoldering multiple myeloma, IBD initially diagnosed with ulcerative colitis status post total proctocolectomy with ileal pouch anal anastomosis in 5297, complicated by SBO in 2144 with stricture in the pouch at the anastomosis and adhesions status post dilation of the stricture and complicated by micro perforation for which he underwent an exploratory laparotomy, lysis of adhesions, creation of a loop ileostomy and eventual ileostomy reversal in 2019  Colonoscopy in March of 2020 showed anal stricture that was dilated and aphthous ulcerations in the a ferritin limb concerning for Crohn's disease  CT enterography from 2020 with ileitis and pouchitis and high-grade narrowing at the ileal pouch anal anastomosis  She was started on Entyvio in September of 2020 and received her 1st infusion but then canceled and did not show up for additional infusions  She was restarted on Entyvio in November but then had interruption of treatment again until February secondary to insurance issues  She has since been on Entyvio every 8 weeks  She also has iron deficiency anemia for which he is receiving iron infusions  In December of 2021 she had diarrhea, pain with calprotectin over a 1000, CRP of 58 and C diff negative  Colonoscopy was performed showed anal stricture that was dilated, as well as inflammation and ulcerations of the pouch  Subsequent CT enterography in February of this year showed active inflammation in the distal ileum and pouch in 2 strictures in the distal ileum/pouch anastomosis  Entyvio level was checked and was low at 2 1  She was put on prednisone taper with improvement of symptoms  Charlyne Slick was subsequently increased to every 4 weeks beginning around March  She reports it was delayed and she recently restarted  She continues to have diarrhea, fecal urgency and nocturnal BMs  Also with abdominal pain  This is likely from active inflammation  Pain may also be related to partial/intermittent obstructions from stricture  IBS, SIBO less like to be primary drivers of symptoms but could be contributing to some symptoms  Endoscopy December with fundic gland polyp and minimal duodenal erosions as well as status post gastric biopsies  CMP with albumin 2 9 otherwise normal AST, ALT, alk-phos, creatinine  Iron 31, ferritin 20, iron saturation 8%  WBC 4 27, normal hemoglobin, platelets 142  A1c 6 4  TSH 2 3  Prior calprotectin 1077  Problem List Items Addressed This Visit        Digestive    Crohn's disease of small intestine with other complication [facility]) - Primary     72-year-old woman with smoldering multiple myeloma, IBD initially diagnosed with ulcerative colitis status post total proctocolectomy with ileal pouch anal anastomosis in 6224, complicated by SBO in 1100 with stricture in the pouch at the anastomosis and adhesions status post dilation of the stricture and complicated by micro perforation for which he underwent an exploratory laparotomy, lysis of adhesions, creation of a loop ileostomy and eventual ileostomy reversal in 2019  Colonoscopy in March of 2020 showed anal stricture that was dilated and aphthous ulcerations in the a ferritin limb concerning for Crohn's disease  CT enterography from 2020 with ileitis and pouchitis and high-grade narrowing at the ileal pouch anal anastomosis  She was started on Entyvio in September of 2020 and received her 1st infusion but then canceled and did not show up for additional infusions  She was restarted on Entyvio in November but then had interruption of treatment again until February secondary to insurance issues  She has since been on Entyvio every 8 weeks  She also has iron deficiency anemia for which he is receiving iron infusions    In December of 2021 she had diarrhea, pain with calprotectin over a 1000, CRP of 58 and C diff negative  Colonoscopy was performed showed anal stricture that was dilated, as well as inflammation and ulcerations of the pouch  Subsequent CT enterography in February of this year showed active inflammation in the distal ileum and pouch in 2 strictures in the distal ileum/pouch anastomosis  Entyvio level was checked and was low at 2 1  She was put on prednisone taper with improvement of symptoms  Dartha Moores was subsequently increased to every 4 weeks beginning around March  Endoscopy December with fundic gland polyp and minimal duodenal erosions as well as status post gastric biopsies  CMP with albumin 2 9 otherwise normal AST, ALT, alk-phos, creatinine  Iron 31, ferritin 20, iron saturation 8%  WBC 4 27, normal hemoglobin, platelets 396  A1c 6 4  TSH 2 3  Prior calprotectin 1077  Continue Entyvio every 4 weeks  Recheck Entyvio level  Consider adding MTX  Repeat blood work and stool tests including calprotectin, CRP, QuantiFERON gold  Consider permanent ileostomy  To consider follow-up with Dr Michelle Keys for management of pouch stricture  Avoid live virus vaccines  Yearly flu shot, COVID vaccine and booster, pneumonia vaccines, Shingrix  DEXA scan  Derm follow-ups              Relevant Orders    CBC and differential    Comprehensive metabolic panel    C-reactive protein    Quantiferon TB Gold Plus    Chronic Hepatitis Panel    Ferritin    Iron    Transferrin    Vedolizumab and Anti-Vedo Ab    Calprotectin,Fecal      Other Visit Diagnoses     Iron deficiency anemia, unspecified iron deficiency anemia type        Relevant Orders    CBC and differential    Comprehensive metabolic panel    C-reactive protein    Quantiferon TB Gold Plus    Chronic Hepatitis Panel    Ferritin    Iron    Transferrin    Vedolizumab and Anti-Vedo Ab    Weight loss        Relevant Orders    CBC and differential    Comprehensive metabolic panel    C-reactive protein    Quantiferon TB Gold Plus    Chronic Hepatitis Panel    Ferritin    Iron    Transferrin    Vedolizumab and Anti-Vedo Ab    Generalized abdominal pain        Relevant Orders    CBC and differential    Comprehensive metabolic panel    C-reactive protein    Quantiferon TB Gold Plus    Chronic Hepatitis Panel    Ferritin    Iron    Transferrin    Vedolizumab and Anti-Vedo Ab    Immunocompromised patient ([address] )        Relevant Orders    CBC and differential    Comprehensive metabolic panel    C-reactive protein    Quantiferon TB Gold Plus    Chronic Hepatitis Panel    Ferritin    Iron    Transferrin    Vedolizumab and Anti-Vedo Ab    Diarrhea, unspecified type        Relevant Orders    Calprotectin,Fecal        Continue Entyvio every 4 weeks  Recheck Entyvio level  Consider adding MTX vs switching to Stelara  She might have nausea from MTX which might limits its use  Repeat blood work and stool tests including calprotectin, CRP, QuantiFERON gold  Consider permanent ileostomy but she di not like her prior stoma and does not want to consider this at this time  Low residue and low FODMAP diet  Imodium before bedtime  Small amount of metamucil  Avoid eating 3-4 hours prior to bedtime  Avoid live virus vaccines  Yearly flu shot, COVID vaccine and booster, pneumonia vaccines, Shingrix  DEXA scan  Derm follow-ups  Restart Prilosec  Follow-up with [facility]         Reason for visit is   Chief Complaint   Patient presents with    Virtual Regular Visit        Encounter provider Yazmin Paulson MD    Provider located at [address] [id number]      Recent Visits  No visits were found meeting these conditions    Showing recent visits within past 7 days and meeting all other requirements  Today's Visits  Date Type Provider Dept   05/24/22 Telemedicine Yazmin Paulson MD [facility]   Showing today's visits and meeting all other requirements  Future Appointments  No visits were found meeting these conditions  Showing future appointments within next 150 days and meeting all other requirements       The patient was identified by name and date of birth  Azael Cardenas was informed that this is a telemedicine visit and that the visit is being conducted through Telephone  My office door was closed  No one else was in the room  She acknowledged consent and understanding of privacy and security of the video platform  The patient has agreed to participate and understands they can discontinue the visit at any time  Patient is aware this is a billable service  It was my intent to perform this visit via video technology but the patient was not able to do a video connection so the visit was completed via audio telephone only  Subjective  Azael Cardenas is a 73 y o  female with Crohn's here for second opinion  She is tired  Yesterday she was bloated  She was constipated  At night she had an accident  She woke up twice in middle of the night  She did not eat but drank peppermint teat and ensure  She is having 6-7 BMs during the day, and accidents during the night  Stools very loose  She thought the stool was dark today but she had oral iron yesterday  Now getting better and previously normal  No BRBPR  She had 2 iron infusions this month  She has some lower abdominal pain  Worse when she does not pas gas and it can be hard at times  She reduced her meals  Occasional rectal pain    + heartburn at times and she takes pepcid and occasional TUMS  She is not sure if she is on the omeprazole  No dysphagia, odynophagia  Last week she vomited and felt that she could not empty   Weight up and down        Past Medical History:   Diagnosis Date    Asthma     [facility]     [facility]     Diabetes mellitus ([facility] )     Hypertension     Multiple myeloma ([facility] )  Ocular hypertension        Past Surgical History:   Procedure Laterality Date    CATARACT EXTRACTION      COLON SURGERY      Partial colectomy, partial removal of the rectum    EXPLORATORY LAPAROTOMY      ILEOSTOMY      IR BIOPSY BONE MARROW  10/13/2021       Current Outpatient Medications   Medication Sig Dispense Refill    albuterol (PROVENTIL HFA,VENTOLIN HFA) 90 mcg/act inhaler Inhale      Ascorbic Acid (Vitamin C) 500 MG CAPS Take by mouth      atorvastatin (LIPITOR) 10 mg tablet Take 10 mg by mouth in the morning        bimatoprost (LUMIGAN) 0 01 % ophthalmic drops Administer 1 drop to both eyes daily at bedtime      Cyanocobalamin (B-12) 5000 MCG CAPS Take by mouth      dorzolamide-timolol (COSOPT) 22 3-6 8 MG/ML ophthalmic solution 1 drop 2 times daily- 8 am and 8 pm- both eyes      Entyvio SOLR 300mg IV on week 0, 2, 6 and then every 8 weeks 1 each 6    famotidine (PEPCID) 40 MG tablet       ferrous sulfate 325 (65 FE) MG EC tablet Take 325 mg by mouth 3 (three) times a day with meals      fluticasone (FLONASE) 50 mcg/act nasal spray       Folic Acid 20 MG CAPS Take by mouth        ipratropium (ATROVENT) 0 03 % nasal spray        levothyroxine 25 mcg tablet Take by mouth      loperamide (IMODIUM) 2 mg capsule Take 2 mg by mouth 4 (four) times a day as needed for diarrhea      mometasone (NASONEX) 50 mcg/act nasal spray 2 sprays into each nostril      mometasone-formoterol (DULERA) 200-5 MCG/ACT inhaler Inhale      Multiple Vitamins-Minerals (OCUTABS-LUTEIN) TABS Take by mouth      Nebulizers (Nebulizer Compressor) MISC Inhale      omeprazole (PriLOSEC) 20 mg delayed release capsule       ondansetron (ZOFRAN) 4 mg tablet Take 1 tablet (4 mg total) by mouth every 8 (eight) hours as needed for nausea or vomiting 60 tablet 0    potassium chloride (KLOR-CON M20) 20 mEq tablet Take 20 mEq by mouth      simethicone (MYLICON) 80 mg chewable tablet Chew 80 mg        timolol (TIMOPTIC) 0 25 % ophthalmic solution       vedolizumab (Entyvio) SOLR Infuse 300 mg into a venous catheter every 56 days 1 each 6    Zinc 50 MG CAPS Take by mouth       No current facility-administered medications for this visit  Allergies   Allergen Reactions    Brimonidine      Red eyes    Tramadol Nausea Only       REVIEW OF SYSTEMS:  10 point ROS reviewed and negative, except as above    Video Exam    There were no vitals filed for this visit  PHYSICAL EXAMINATION:  Unable to perform physical examination given the unavailability of a video application  I spent 40 minutes directly with the patient during this visit    VIRTUAL VISIT DISCLAIMER      Giancarlo Russell verbally agrees to participate in [employer]  Pt is aware that [employer] could be limited without vital signs or the ability to perform a full hands-on physical exam  Allyssa Mccracken understands she or the provider may request at any time to terminate the video visit and request the patient to seek care or treatment in person

## 2022-05-24 NOTE — ASSESSMENT & PLAN NOTE
65-year-old woman with smoldering multiple myeloma, IBD initially diagnosed with ulcerative colitis status post total proctocolectomy with ileal pouch anal anastomosis in 4935, complicated by SBO in 8413 with stricture in the pouch at the anastomosis and adhesions status post dilation of the stricture and complicated by micro perforation for which he underwent an exploratory laparotomy, lysis of adhesions, creation of a loop ileostomy and eventual ileostomy reversal in 2019  Colonoscopy in March of 2020 showed anal stricture that was dilated and aphthous ulcerations in the a ferritin limb concerning for Crohn's disease  CT enterography from 2020 with ileitis and pouchitis and high-grade narrowing at the ileal pouch anal anastomosis  She was started on Entyvio in September of 2020 and received her 1st infusion but then canceled and did not show up for additional infusions  She was restarted on Entyvio in November but then had interruption of treatment again until February secondary to insurance issues  She has since been on Entyvio every 8 weeks  She also has iron deficiency anemia for which he is receiving iron infusions  In December of 2021 she had diarrhea, pain with calprotectin over a 1000, CRP of 58 and C diff negative  Colonoscopy was performed showed anal stricture that was dilated, as well as inflammation and ulcerations of the pouch  Subsequent CT enterography in February of this year showed active inflammation in the distal ileum and pouch in 2 strictures in the distal ileum/pouch anastomosis  Entyvio level was checked and was low at 2 1  She was put on prednisone taper with improvement of symptoms  Shira Lout was subsequently increased to every 4 weeks beginning around March  Endoscopy December with fundic gland polyp and minimal duodenal erosions as well as status post gastric biopsies  CMP with albumin 2 9 otherwise normal AST, ALT, alk-phos, creatinine    Iron 31, ferritin 20, iron saturation 8%  WBC 4 27, normal hemoglobin, platelets 021  A1c 6 4  TSH 2 3  Prior calprotectin 1077  Continue Entyvio every 4 weeks  Recheck Entyvio level  Consider adding MTX  Repeat blood work and stool tests including calprotectin, CRP, QuantiFERON gold  Consider permanent ileostomy  To consider follow-up with Dr Thania Jeff for management of pouch stricture  Avoid live virus vaccines  Yearly flu shot, COVID vaccine and booster, pneumonia vaccines, Shingrix  DEXA scan  Derm follow-ups

## 2022-05-27 ENCOUNTER — APPOINTMENT (OUTPATIENT)
Dept: LAB | Facility: CLINIC | Age: 72
End: 2022-05-27
Payer: COMMERCIAL

## 2022-05-27 DIAGNOSIS — D50.9 IRON DEFICIENCY ANEMIA, UNSPECIFIED IRON DEFICIENCY ANEMIA TYPE: ICD-10-CM

## 2022-05-27 DIAGNOSIS — R19.7 DIARRHEA, UNSPECIFIED TYPE: ICD-10-CM

## 2022-05-27 DIAGNOSIS — R10.84 GENERALIZED ABDOMINAL PAIN: ICD-10-CM

## 2022-05-27 DIAGNOSIS — D84.9 IMMUNOCOMPROMISED PATIENT (HCC): ICD-10-CM

## 2022-05-27 DIAGNOSIS — K50.018 CROHN'S DISEASE OF SMALL INTESTINE WITH OTHER COMPLICATION (HCC): ICD-10-CM

## 2022-05-27 DIAGNOSIS — R63.4 WEIGHT LOSS: ICD-10-CM

## 2022-05-27 LAB
ALBUMIN SERPL BCP-MCNC: 3.3 G/DL (ref 3.5–5)
ALP SERPL-CCNC: 127 U/L (ref 46–116)
ALT SERPL W P-5'-P-CCNC: 29 U/L (ref 12–78)
ANION GAP SERPL CALCULATED.3IONS-SCNC: 2 MMOL/L (ref 4–13)
AST SERPL W P-5'-P-CCNC: 22 U/L (ref 5–45)
BASOPHILS # BLD AUTO: 0.04 THOUSANDS/ΜL (ref 0–0.1)
BASOPHILS NFR BLD AUTO: 1 % (ref 0–1)
BILIRUB SERPL-MCNC: 0.93 MG/DL (ref 0.2–1)
BUN SERPL-MCNC: 25 MG/DL (ref 5–25)
CALCIUM ALBUM COR SERPL-MCNC: 11.2 MG/DL (ref 8.3–10.1)
CALCIUM SERPL-MCNC: 10.6 MG/DL (ref 8.3–10.1)
CHLORIDE SERPL-SCNC: 97 MMOL/L (ref 100–108)
CO2 SERPL-SCNC: 32 MMOL/L (ref 21–32)
CREAT SERPL-MCNC: 1.2 MG/DL (ref 0.6–1.3)
CRP SERPL QL: 41 MG/L
EOSINOPHIL # BLD AUTO: 0.05 THOUSAND/ΜL (ref 0–0.61)
EOSINOPHIL NFR BLD AUTO: 1 % (ref 0–6)
ERYTHROCYTE [DISTWIDTH] IN BLOOD BY AUTOMATED COUNT: 16.6 % (ref 11.6–15.1)
FERRITIN SERPL-MCNC: 780 NG/ML (ref 8–388)
GFR SERPL CREATININE-BSD FRML MDRD: 46 ML/MIN/1.73SQ M
GLUCOSE P FAST SERPL-MCNC: 155 MG/DL (ref 65–99)
HBV CORE AB SER QL: NORMAL
HBV CORE IGM SER QL: NORMAL
HBV SURFACE AG SER QL: NORMAL
HCT VFR BLD AUTO: 47.4 % (ref 34.8–46.1)
HCV AB SER QL: NORMAL
HGB BLD-MCNC: 15.1 G/DL (ref 11.5–15.4)
IMM GRANULOCYTES # BLD AUTO: 0.02 THOUSAND/UL (ref 0–0.2)
IMM GRANULOCYTES NFR BLD AUTO: 0 % (ref 0–2)
IRON SERPL-MCNC: 57 UG/DL (ref 50–170)
LYMPHOCYTES # BLD AUTO: 1.64 THOUSANDS/ΜL (ref 0.6–4.47)
LYMPHOCYTES NFR BLD AUTO: 28 % (ref 14–44)
MCH RBC QN AUTO: 27.7 PG (ref 26.8–34.3)
MCHC RBC AUTO-ENTMCNC: 31.9 G/DL (ref 31.4–37.4)
MCV RBC AUTO: 87 FL (ref 82–98)
MONOCYTES # BLD AUTO: 0.58 THOUSAND/ΜL (ref 0.17–1.22)
MONOCYTES NFR BLD AUTO: 10 % (ref 4–12)
NEUTROPHILS # BLD AUTO: 3.5 THOUSANDS/ΜL (ref 1.85–7.62)
NEUTS SEG NFR BLD AUTO: 60 % (ref 43–75)
NRBC BLD AUTO-RTO: 0 /100 WBCS
PLATELET # BLD AUTO: 357 THOUSANDS/UL (ref 149–390)
PMV BLD AUTO: 11.8 FL (ref 8.9–12.7)
POTASSIUM SERPL-SCNC: 4.6 MMOL/L (ref 3.5–5.3)
PROT SERPL-MCNC: 9.2 G/DL (ref 6.4–8.2)
RBC # BLD AUTO: 5.45 MILLION/UL (ref 3.81–5.12)
SODIUM SERPL-SCNC: 131 MMOL/L (ref 136–145)
TRANSFERRIN SERPL-MCNC: 264 MG/DL (ref 200–400)
WBC # BLD AUTO: 5.83 THOUSAND/UL (ref 4.31–10.16)

## 2022-05-27 PROCEDURE — 87340 HEPATITIS B SURFACE AG IA: CPT

## 2022-05-27 PROCEDURE — 82728 ASSAY OF FERRITIN: CPT

## 2022-05-27 PROCEDURE — 85025 COMPLETE CBC W/AUTO DIFF WBC: CPT

## 2022-05-27 PROCEDURE — 80280 DRUG ASSAY VEDOLIZUMAB: CPT

## 2022-05-27 PROCEDURE — 86140 C-REACTIVE PROTEIN: CPT

## 2022-05-27 PROCEDURE — 80053 COMPREHEN METABOLIC PANEL: CPT

## 2022-05-27 PROCEDURE — 86480 TB TEST CELL IMMUN MEASURE: CPT

## 2022-05-27 PROCEDURE — 82397 CHEMILUMINESCENT ASSAY: CPT

## 2022-05-27 PROCEDURE — 86704 HEP B CORE ANTIBODY TOTAL: CPT

## 2022-05-27 PROCEDURE — 36415 COLL VENOUS BLD VENIPUNCTURE: CPT

## 2022-05-27 PROCEDURE — 86705 HEP B CORE ANTIBODY IGM: CPT

## 2022-05-27 PROCEDURE — 86803 HEPATITIS C AB TEST: CPT

## 2022-05-27 PROCEDURE — 84466 ASSAY OF TRANSFERRIN: CPT

## 2022-05-27 PROCEDURE — 83540 ASSAY OF IRON: CPT

## 2022-05-31 ENCOUNTER — HOSPITAL ENCOUNTER (OUTPATIENT)
Dept: INFUSION CENTER | Facility: CLINIC | Age: 72
Discharge: HOME | End: 2022-05-31
Payer: COMMERCIAL

## 2022-05-31 ENCOUNTER — APPOINTMENT (OUTPATIENT)
Dept: LAB | Facility: CLINIC | Age: 72
End: 2022-05-31
Payer: COMMERCIAL

## 2022-05-31 VITALS
HEART RATE: 92 BPM | DIASTOLIC BLOOD PRESSURE: 60 MMHG | RESPIRATION RATE: 16 BRPM | TEMPERATURE: 97.4 F | SYSTOLIC BLOOD PRESSURE: 101 MMHG

## 2022-05-31 DIAGNOSIS — K50.018 CROHN'S DISEASE OF SMALL INTESTINE WITH OTHER COMPLICATION (HCC): Primary | ICD-10-CM

## 2022-05-31 LAB
GAMMA INTERFERON BACKGROUND BLD IA-ACNC: 0.03 IU/ML
M TB IFN-G BLD-IMP: NEGATIVE
M TB IFN-G CD4+ BCKGRND COR BLD-ACNC: 0 IU/ML
M TB IFN-G CD4+ BCKGRND COR BLD-ACNC: 0.01 IU/ML
MITOGEN IGNF BCKGRD COR BLD-ACNC: 7.73 IU/ML

## 2022-05-31 PROCEDURE — 96413 CHEMO IV INFUSION 1 HR: CPT

## 2022-05-31 PROCEDURE — 83993 ASSAY FOR CALPROTECTIN FECAL: CPT

## 2022-05-31 RX ORDER — ACETAMINOPHEN 325 MG/1
650 TABLET ORAL ONCE
Status: CANCELLED | OUTPATIENT
Start: 2022-06-28

## 2022-05-31 RX ORDER — DIPHENHYDRAMINE HCL 25 MG
25 TABLET ORAL ONCE
Status: COMPLETED | OUTPATIENT
Start: 2022-05-31 | End: 2022-05-31

## 2022-05-31 RX ORDER — SODIUM CHLORIDE 9 MG/ML
20 INJECTION, SOLUTION INTRAVENOUS ONCE
Status: COMPLETED | OUTPATIENT
Start: 2022-05-31 | End: 2022-05-31

## 2022-05-31 RX ORDER — ACETAMINOPHEN 325 MG/1
650 TABLET ORAL ONCE
Status: COMPLETED | OUTPATIENT
Start: 2022-05-31 | End: 2022-05-31

## 2022-05-31 RX ORDER — SODIUM CHLORIDE 9 MG/ML
20 INJECTION, SOLUTION INTRAVENOUS ONCE
Status: CANCELLED | OUTPATIENT
Start: 2022-06-28

## 2022-05-31 RX ORDER — DIPHENHYDRAMINE HCL 25 MG
25 TABLET ORAL ONCE
Status: CANCELLED | OUTPATIENT
Start: 2022-06-28

## 2022-05-31 RX ADMIN — VEDOLIZUMAB 300 MG: 300 INJECTION, POWDER, LYOPHILIZED, FOR SOLUTION INTRAVENOUS at 15:11

## 2022-05-31 RX ADMIN — SODIUM CHLORIDE 20 ML/HR: 0.9 INJECTION, SOLUTION INTRAVENOUS at 14:25

## 2022-05-31 RX ADMIN — DIPHENHYDRAMINE HYDROCHLORIDE 25 MG: 25 TABLET ORAL at 14:25

## 2022-05-31 RX ADMIN — ACETAMINOPHEN 650 MG: 325 TABLET ORAL at 14:25

## 2022-05-31 NOTE — PROGRESS NOTES
PT here for entyvio infusion, offering no complaints  Left arm IV placed with positive blood return noted  Tolerated infusion without incident  PIV removed  AVS given  Walked out in stable condition

## 2022-06-04 LAB — CALPROTECTIN STL-MCNT: 341 UG/G (ref 0–120)

## 2022-06-07 ENCOUNTER — TELEPHONE (OUTPATIENT)
Dept: GASTROENTEROLOGY | Facility: MEDICAL CENTER | Age: 72
End: 2022-06-07

## 2022-06-07 ENCOUNTER — PREP FOR PROCEDURE (OUTPATIENT)
Dept: GASTROENTEROLOGY | Facility: MEDICAL CENTER | Age: 72
End: 2022-06-07

## 2022-06-07 DIAGNOSIS — K50.018 CROHN'S DISEASE OF SMALL INTESTINE WITH OTHER COMPLICATION (HCC): Primary | ICD-10-CM

## 2022-06-07 NOTE — TELEPHONE ENCOUNTER
Sure, Dr Conor Peraza - can we please schedule her for the procedure with Dr Christopher Little - ty!

## 2022-06-08 NOTE — TELEPHONE ENCOUNTER
Booked ptn for 6/16/22 for flex sig with specified prep per Dr Yaquelin Olson for ptns pouchoscopy  Mailed the ptn prep and also told ptn to call me and let me know if that date does work for her   We are trying to avoid conflict with ptns infusion and get this done ASAP

## 2022-06-10 ENCOUNTER — TELEPHONE (OUTPATIENT)
Dept: GASTROENTEROLOGY | Facility: CLINIC | Age: 72
End: 2022-06-10

## 2022-06-10 NOTE — TELEPHONE ENCOUNTER
Left a message for the ptn to call me to discuss when she can do her pouchoscopy   Cancelled colon order in the system

## 2022-06-13 ENCOUNTER — TELEPHONE (OUTPATIENT)
Dept: OTHER | Facility: OTHER | Age: 72
End: 2022-06-13

## 2022-06-13 NOTE — TELEPHONE ENCOUNTER
On the 20th patient is scheduled for a pouch exploration and now patient is wondering if the doctor can dilate her pouch

## 2022-06-13 NOTE — TELEPHONE ENCOUNTER
Spoke to patient I let her know Dr Lupe Hurley said if there is a stricture he can do a limited dilation

## 2022-06-13 NOTE — TELEPHONE ENCOUNTER
ptn is scheduled for a pouchoscopy on 6/20/22 ordered as a flex sig per Dr Tatianna Boob  Ptn was sent a custom prep of clear liquid diet from 6pm to midnight and nothing to eat or drink after that  She was also directed to place 2 fleet enemas 1 after the other into her pouch 1 hour prior to her arrival time at the hospital  This was a custom prep issued by Dr Tatianna Bobo and mailed to the ptn  ptn is aware she will be doing prep on 6/19/22 which is a Sunday   If there are any questions or issues please consult with Dr Tatianna Bobo

## 2022-06-20 ENCOUNTER — HOSPITAL ENCOUNTER (OUTPATIENT)
Dept: GASTROENTEROLOGY | Facility: HOSPITAL | Age: 72
Setting detail: OUTPATIENT SURGERY
Discharge: HOME | End: 2022-06-20
Attending: INTERNAL MEDICINE | Admitting: INTERNAL MEDICINE
Payer: COMMERCIAL

## 2022-06-20 ENCOUNTER — ANESTHESIA EVENT (OUTPATIENT)
Dept: GASTROENTEROLOGY | Facility: HOSPITAL | Age: 72
End: 2022-06-20

## 2022-06-20 ENCOUNTER — ANESTHESIA (OUTPATIENT)
Dept: GASTROENTEROLOGY | Facility: HOSPITAL | Age: 72
End: 2022-06-20

## 2022-06-20 VITALS
BODY MASS INDEX: 19.8 KG/M2 | OXYGEN SATURATION: 100 % | WEIGHT: 107.58 LBS | DIASTOLIC BLOOD PRESSURE: 67 MMHG | TEMPERATURE: 97.9 F | HEIGHT: 62 IN | HEART RATE: 54 BPM | SYSTOLIC BLOOD PRESSURE: 111 MMHG | RESPIRATION RATE: 16 BRPM

## 2022-06-20 DIAGNOSIS — K50.018 CROHN'S DISEASE OF SMALL INTESTINE WITH OTHER COMPLICATION (HCC): ICD-10-CM

## 2022-06-20 PROBLEM — Z98.890 STATUS POST REVERSAL OF ILEOSTOMY: Status: ACTIVE | Noted: 2019-10-10

## 2022-06-20 PROBLEM — H40.1132 PRIMARY OPEN ANGLE GLAUCOMA OF BOTH EYES, MODERATE STAGE: Status: ACTIVE | Noted: 2020-06-24

## 2022-06-20 PROBLEM — I10 HYPERTENSION GOAL BP (BLOOD PRESSURE) < 140/90: Status: ACTIVE | Noted: 2019-10-22

## 2022-06-20 PROBLEM — D47.2 MGUS (MONOCLONAL GAMMOPATHY OF UNKNOWN SIGNIFICANCE): Status: ACTIVE | Noted: 2021-09-28

## 2022-06-20 PROBLEM — E03.9 HYPOTHYROID: Status: ACTIVE | Noted: 2022-06-20

## 2022-06-20 PROBLEM — K50.90 CROHN'S DISEASE (HCC): Status: ACTIVE | Noted: 2020-06-23

## 2022-06-20 PROCEDURE — 88305 TISSUE EXAM BY PATHOLOGIST: CPT | Performed by: PATHOLOGY

## 2022-06-20 PROCEDURE — 88342 IMHCHEM/IMCYTCHM 1ST ANTB: CPT | Performed by: PATHOLOGY

## 2022-06-20 PROCEDURE — 88341 IMHCHEM/IMCYTCHM EA ADD ANTB: CPT | Performed by: PATHOLOGY

## 2022-06-20 PROCEDURE — 45331 SIGMOIDOSCOPY AND BIOPSY: CPT | Performed by: INTERNAL MEDICINE

## 2022-06-20 RX ORDER — LIDOCAINE HYDROCHLORIDE 10 MG/ML
INJECTION, SOLUTION EPIDURAL; INFILTRATION; INTRACAUDAL; PERINEURAL AS NEEDED
Status: DISCONTINUED | OUTPATIENT
Start: 2022-06-20 | End: 2022-06-20

## 2022-06-20 RX ORDER — SODIUM CHLORIDE, SODIUM LACTATE, POTASSIUM CHLORIDE, CALCIUM CHLORIDE 600; 310; 30; 20 MG/100ML; MG/100ML; MG/100ML; MG/100ML
125 INJECTION, SOLUTION INTRAVENOUS CONTINUOUS
Status: DISCONTINUED | OUTPATIENT
Start: 2022-06-20 | End: 2022-06-24 | Stop reason: HOSPADM

## 2022-06-20 RX ORDER — PROPOFOL 10 MG/ML
INJECTION, EMULSION INTRAVENOUS AS NEEDED
Status: DISCONTINUED | OUTPATIENT
Start: 2022-06-20 | End: 2022-06-20

## 2022-06-20 RX ADMIN — PROPOFOL 30 MG: 10 INJECTION, EMULSION INTRAVENOUS at 08:13

## 2022-06-20 RX ADMIN — LIDOCAINE HYDROCHLORIDE 50 MG: 10 INJECTION, SOLUTION EPIDURAL; INFILTRATION; INTRACAUDAL; PERINEURAL at 08:07

## 2022-06-20 RX ADMIN — PROPOFOL 60 MG: 10 INJECTION, EMULSION INTRAVENOUS at 08:07

## 2022-06-20 RX ADMIN — PROPOFOL 40 MG: 10 INJECTION, EMULSION INTRAVENOUS at 08:09

## 2022-06-20 RX ADMIN — SODIUM CHLORIDE, SODIUM LACTATE, POTASSIUM CHLORIDE, AND CALCIUM CHLORIDE 125 ML/HR: .6; .31; .03; .02 INJECTION, SOLUTION INTRAVENOUS at 07:49

## 2022-06-20 NOTE — H&P
History and Physical -  [facility]  Marysol Romero 73 y o  female MRN: [id number]                  HPI: Marysol Romero is a 73y o  year old female who presents for endoscopy of the ileal pouch, history of pouchitis with stricture      REVIEW OF SYSTEMS: Per the HPI, and otherwise unremarkable  Historical Information   Past Medical History:   Diagnosis Date    Asthma     [facility]) [facility] )     Crohn's disease ([facility] )     Diabetes mellitus ([place] [address] )     Hypertension     Multiple myeloma ([place] [address] )     Ocular hypertension      Past Surgical History:   Procedure Laterality Date    CATARACT EXTRACTION      COLON SURGERY      Partial colectomy, partial removal of the rectum    EXPLORATORY LAPAROTOMY      ILEOSTOMY      IR BIOPSY BONE MARROW  10/13/2021     Social History   Social History     Substance and Sexual Activity   Alcohol Use No     Social History     Substance and Sexual Activity   Drug Use No     Social History     Tobacco Use   Smoking Status Never Smoker   Smokeless Tobacco Never Used     Family History   Problem Relation Age of Onset    Diabetes Mother     Hypertension Mother        Meds/Allergies     (Not in a hospital admission)      Allergies   Allergen Reactions    Brimonidine      Red eyes    Tramadol Nausea Only       Objective     There were no vitals taken for this visit        PHYSICAL EXAM    Gen: NAD  CV: RRR  CHEST: Clear  ABD: soft, NT/ND  EXT: no edema  Neuro: AAO      ASSESSMENT/PLAN:  This is a 73y o  year old female here for pouchitis with stricture    PLAN:   Procedure:  Endoscopy of pouch

## 2022-06-20 NOTE — ANESTHESIA PREPROCEDURE EVALUATION
Procedure:  FLEXIBLE SIGMOIDOSCOPY    Relevant Problems   CARDIO   (+) Hypertension goal BP (blood pressure) < 140/90      ENDO   (+) Hypothyroid      GI/HEPATIC   (+) Gastro-esophageal reflux disease without esophagitis      PULMONARY   (+) Asthma      Digestive   (+) Crohn's disease of small intestine with other complication (HCC)      Endocrine   (+) Diabetes mellitus (HCC)      Other   (+) MGUS (monoclonal gammopathy of unknown significance)   (+) Ocular hypertension   (+) Primary open angle glaucoma of both eyes, moderate stage   (+) Protein-calorie malnutrition, unspecified severity (HCC)   (+) Status post reversal of ileostomy      Seasonal asthma    Physical Exam    Airway    Mallampati score: I  TM Distance: >3 FB  Neck ROM: full     Dental   Comment: Denies loose teeth,     Cardiovascular  Cardiovascular exam normal    Pulmonary  Pulmonary exam normal     Other Findings  Portions of exam deferred due to low yield and/or unknown COVID status      Anesthesia Plan  ASA Score- 3     Anesthesia Type- IV sedation with anesthesia with ASA Monitors  Additional Monitors:   Airway Plan:           Plan Factors-Exercise tolerance (METS): >4 METS  Chart reviewed  Existing labs reviewed  Patient summary reviewed  Patient is not a current smoker  Induction- intravenous  Postoperative Plan-     Informed Consent- Anesthetic plan and risks discussed with patient  I personally reviewed this patient with the CRNA  Discussed and agreed on the Anesthesia Plan with the CRNA  Angely London

## 2022-06-20 NOTE — INTERVAL H&P NOTE
H&P reviewed  After examining the patient I find no changes in the patients condition since the H&P had been written      Vitals:    06/20/22 0724   BP: 143/94   Pulse: 56   Resp: 20   Temp: 97 6 °F (36 4 °C)   SpO2: 100%

## 2022-06-20 NOTE — ANESTHESIA POSTPROCEDURE EVALUATION
Post-Op Assessment Note    CV Status:  Stable  Pain Score: 0    Pain management: adequate     Mental Status:  Awake and sleepy   Hydration Status:  Euvolemic   PONV Controlled:  Controlled   Airway Patency:  Patent and adequate      Post Op Vitals Reviewed: Yes      Staff: CRNA         No complications documented      BP   101/61   Temp      Pulse  62   Resp 14   SpO2 100

## 2022-06-28 ENCOUNTER — HOSPITAL ENCOUNTER (OUTPATIENT)
Dept: INFUSION CENTER | Facility: CLINIC | Age: 72
Discharge: HOME | End: 2022-06-28
Payer: COMMERCIAL

## 2022-06-28 VITALS
SYSTOLIC BLOOD PRESSURE: 109 MMHG | DIASTOLIC BLOOD PRESSURE: 65 MMHG | TEMPERATURE: 97.3 F | HEART RATE: 71 BPM | RESPIRATION RATE: 16 BRPM

## 2022-06-28 DIAGNOSIS — K50.018 CROHN'S DISEASE OF SMALL INTESTINE WITH OTHER COMPLICATION (HCC): Primary | ICD-10-CM

## 2022-06-28 PROCEDURE — 96413 CHEMO IV INFUSION 1 HR: CPT

## 2022-06-28 RX ORDER — SODIUM CHLORIDE 9 MG/ML
20 INJECTION, SOLUTION INTRAVENOUS ONCE
Status: CANCELLED | OUTPATIENT
Start: 2022-07-26

## 2022-06-28 RX ORDER — DIPHENHYDRAMINE HCL 25 MG
25 TABLET ORAL ONCE
Status: CANCELLED | OUTPATIENT
Start: 2022-07-26

## 2022-06-28 RX ORDER — ACETAMINOPHEN 325 MG/1
650 TABLET ORAL ONCE
Status: COMPLETED | OUTPATIENT
Start: 2022-06-28 | End: 2022-06-28

## 2022-06-28 RX ORDER — SODIUM CHLORIDE 9 MG/ML
20 INJECTION, SOLUTION INTRAVENOUS ONCE
Status: COMPLETED | OUTPATIENT
Start: 2022-06-28 | End: 2022-06-28

## 2022-06-28 RX ORDER — ACETAMINOPHEN 325 MG/1
650 TABLET ORAL ONCE
Status: CANCELLED | OUTPATIENT
Start: 2022-07-26

## 2022-06-28 RX ORDER — DIPHENHYDRAMINE HCL 25 MG
25 TABLET ORAL ONCE
Status: COMPLETED | OUTPATIENT
Start: 2022-06-28 | End: 2022-06-28

## 2022-06-28 RX ADMIN — SODIUM CHLORIDE 20 ML/HR: 0.9 INJECTION, SOLUTION INTRAVENOUS at 14:15

## 2022-06-28 RX ADMIN — VEDOLIZUMAB 300 MG: 300 INJECTION, POWDER, LYOPHILIZED, FOR SOLUTION INTRAVENOUS at 14:52

## 2022-06-28 RX ADMIN — ACETAMINOPHEN 650 MG: 325 TABLET, FILM COATED ORAL at 14:15

## 2022-06-28 RX ADMIN — DIPHENHYDRAMINE HYDROCHLORIDE 25 MG: 25 TABLET ORAL at 14:15

## 2022-06-28 NOTE — PROGRESS NOTES
Pt to clinic for entyvio, offers no complaints today, denies recent illness and abx use, tolerated infusion without complications, aware of next appointment, PIV removed, avs declined

## 2022-06-29 ENCOUNTER — TELEPHONE (OUTPATIENT)
Dept: GASTROENTEROLOGY | Facility: CLINIC | Age: 72
End: 2022-06-29

## 2022-06-29 NOTE — TELEPHONE ENCOUNTER
PT CALLED WANTING TO KNOW IF YOU CAN TAKE BENADRYL OFF HER ENTYVIO INFUSION CARE PLAN AS IT MAKES HER VERY DROWSY

## 2022-06-30 ENCOUNTER — APPOINTMENT (OUTPATIENT)
Dept: LAB | Facility: CLINIC | Age: 72
End: 2022-06-30
Payer: COMMERCIAL

## 2022-06-30 DIAGNOSIS — E61.1 LOW IRON: ICD-10-CM

## 2022-06-30 LAB
BASOPHILS # BLD AUTO: 0.05 THOUSANDS/ΜL (ref 0–0.1)
BASOPHILS NFR BLD AUTO: 1 % (ref 0–1)
EOSINOPHIL # BLD AUTO: 0.09 THOUSAND/ΜL (ref 0–0.61)
EOSINOPHIL NFR BLD AUTO: 2 % (ref 0–6)
ERYTHROCYTE [DISTWIDTH] IN BLOOD BY AUTOMATED COUNT: 17.6 % (ref 11.6–15.1)
FERRITIN SERPL-MCNC: 340 NG/ML (ref 8–388)
HCT VFR BLD AUTO: 43.2 % (ref 34.8–46.1)
HGB BLD-MCNC: 13.3 G/DL (ref 11.5–15.4)
IMM GRANULOCYTES # BLD AUTO: 0.02 THOUSAND/UL (ref 0–0.2)
IMM GRANULOCYTES NFR BLD AUTO: 0 % (ref 0–2)
IRON SATN MFR SERPL: 8 % (ref 15–50)
IRON SERPL-MCNC: 61 UG/DL (ref 50–170)
LYMPHOCYTES # BLD AUTO: 2.18 THOUSANDS/ΜL (ref 0.6–4.47)
LYMPHOCYTES NFR BLD AUTO: 43 % (ref 14–44)
MCH RBC QN AUTO: 28.5 PG (ref 26.8–34.3)
MCHC RBC AUTO-ENTMCNC: 30.8 G/DL (ref 31.4–37.4)
MCV RBC AUTO: 93 FL (ref 82–98)
MONOCYTES # BLD AUTO: 0.56 THOUSAND/ΜL (ref 0.17–1.22)
MONOCYTES NFR BLD AUTO: 11 % (ref 4–12)
NEUTROPHILS # BLD AUTO: 2.22 THOUSANDS/ΜL (ref 1.85–7.62)
NEUTS SEG NFR BLD AUTO: 43 % (ref 43–75)
NRBC BLD AUTO-RTO: 0 /100 WBCS
PLATELET # BLD AUTO: 397 THOUSANDS/UL (ref 149–390)
PMV BLD AUTO: 11.1 FL (ref 8.9–12.7)
RBC # BLD AUTO: 4.67 MILLION/UL (ref 3.81–5.12)
TIBC SERPL-MCNC: 795 UG/DL (ref 250–450)
WBC # BLD AUTO: 5.12 THOUSAND/UL (ref 4.31–10.16)

## 2022-06-30 PROCEDURE — 83550 IRON BINDING TEST: CPT | Performed by: INTERNAL MEDICINE

## 2022-06-30 PROCEDURE — 85025 COMPLETE CBC W/AUTO DIFF WBC: CPT

## 2022-06-30 PROCEDURE — 82728 ASSAY OF FERRITIN: CPT | Performed by: INTERNAL MEDICINE

## 2022-06-30 PROCEDURE — 36415 COLL VENOUS BLD VENIPUNCTURE: CPT | Performed by: INTERNAL MEDICINE

## 2022-06-30 PROCEDURE — 83540 ASSAY OF IRON: CPT | Performed by: INTERNAL MEDICINE

## 2022-07-12 ENCOUNTER — TELEPHONE (OUTPATIENT)
Dept: GASTROENTEROLOGY | Facility: CLINIC | Age: 72
End: 2022-07-12

## 2022-07-12 ENCOUNTER — TELEPHONE (OUTPATIENT)
Dept: GASTROENTEROLOGY | Facility: MEDICAL CENTER | Age: 72
End: 2022-07-12

## 2022-07-12 DIAGNOSIS — K50.018 CROHN'S DISEASE OF SMALL INTESTINE WITH OTHER COMPLICATION (HCC): Primary | ICD-10-CM

## 2022-07-12 DIAGNOSIS — E61.1 LOW IRON: ICD-10-CM

## 2022-07-12 NOTE — TELEPHONE ENCOUNTER
Pt called back to speak with Dr Makeda Cisneros to discuss plan of care following recent flex sig results

## 2022-07-12 NOTE — TELEPHONE ENCOUNTER
Called and spoke to the patient  She is interested in switching to stelara  Risks and benefits discussed       Will try to get this approved

## 2022-07-13 NOTE — TELEPHONE ENCOUNTER
Connected with the patient via phone  Provided education on Stelara  Patient would like to stay at [facility]  She is aware that Lion Velasquez will take 2 prior authorizations and we will be reaching out as to a determination  Melissa: Please complete  Thank you!

## 2022-07-18 RX ORDER — SODIUM CHLORIDE 9 MG/ML
20 INJECTION, SOLUTION INTRAVENOUS ONCE
Status: CANCELLED | OUTPATIENT
Start: 2022-07-26

## 2022-07-18 NOTE — TELEPHONE ENCOUNTER
JAYSON FOR PT THAT SHE CAN KEEP HER APPT DATE AND TIME OF 7/26 FOR STELARA INFUSION, I LET DEBORAH KNOW OF THE SWITCH AS WELL

## 2022-07-18 NOTE — TELEPHONE ENCOUNTER
Pts Stelara was approved, auth good 7/13- 9/13 [id number]  Can you please go in and update her [place] orders for stelara  She had last entyvio infusion on 6/28, when should she have Stelara infusion?

## 2022-07-25 ENCOUNTER — TELEPHONE (OUTPATIENT)
Dept: GASTROENTEROLOGY | Facility: CLINIC | Age: 72
End: 2022-07-25

## 2022-07-26 ENCOUNTER — HOSPITAL ENCOUNTER (OUTPATIENT)
Dept: INFUSION CENTER | Facility: CLINIC | Age: 72
Discharge: HOME | End: 2022-07-26
Payer: COMMERCIAL

## 2022-07-26 VITALS
TEMPERATURE: 97.6 F | DIASTOLIC BLOOD PRESSURE: 63 MMHG | RESPIRATION RATE: 16 BRPM | WEIGHT: 113.4 LBS | SYSTOLIC BLOOD PRESSURE: 113 MMHG | HEART RATE: 68 BPM | BODY MASS INDEX: 20.74 KG/M2

## 2022-07-26 DIAGNOSIS — E61.1 LOW IRON: Primary | ICD-10-CM

## 2022-07-26 DIAGNOSIS — K50.018 CROHN'S DISEASE OF SMALL INTESTINE WITH OTHER COMPLICATION (HCC): ICD-10-CM

## 2022-07-26 PROCEDURE — 96365 THER/PROPH/DIAG IV INF INIT: CPT

## 2022-07-26 RX ORDER — SODIUM CHLORIDE 9 MG/ML
20 INJECTION, SOLUTION INTRAVENOUS ONCE
Status: CANCELLED | OUTPATIENT
Start: 2022-07-26

## 2022-07-26 RX ORDER — SODIUM CHLORIDE 9 MG/ML
20 INJECTION, SOLUTION INTRAVENOUS ONCE
Status: COMPLETED | OUTPATIENT
Start: 2022-07-26 | End: 2022-07-26

## 2022-07-26 RX ADMIN — USTEKINUMAB 260 MG: 130 SOLUTION INTRAVENOUS at 14:29

## 2022-07-26 RX ADMIN — SODIUM CHLORIDE 20 ML/HR: 0.9 INJECTION, SOLUTION INTRAVENOUS at 14:29

## 2022-07-26 NOTE — PROGRESS NOTES
Pt to clinic for Stelara  Offers no complaints today  Denies recent illness or antibiotic use  Pt tolerated infusion without complications  Patient states this is last infusion  Will be receiving ( per patient) injections  To call Dr Sapphire Andujar to confirm  PIV removed  AVS given

## 2022-07-27 ENCOUNTER — TELEPHONE (OUTPATIENT)
Dept: GASTROENTEROLOGY | Facility: CLINIC | Age: 72
End: 2022-07-27

## 2022-07-27 NOTE — TELEPHONE ENCOUNTER
Patient called was told by infusion center that yesterday, 07/26/22, was her last Entyvio  infusion  Patient was told that she is going to be switched to [facility] and that is is an injection  Is this correct?  Please call Fina Gallagher at [phone]

## 2022-07-27 NOTE — TELEPHONE ENCOUNTER
Yes, Dr Makeda Cisneros has switched her from the [facility] to [facility] for treatment of her crohn's disease  She received the one time Stelara infusion yesterday and then after that she will be doing an injection of the Stelara subcutaneously at home every 8 weeks (starting 8 weeks after the infusion)  Can you please inform patient of this and then also forward a message to the IBD pool for follow up regarding making sure she receives the Stelara injections (she will be due for her fist home Stelara injection on 9/20)

## 2022-08-19 ENCOUNTER — TELEPHONE (OUTPATIENT)
Dept: OTHER | Facility: OTHER | Age: 72
End: 2022-08-19

## 2022-08-19 ENCOUNTER — TELEPHONE (OUTPATIENT)
Dept: GASTROENTEROLOGY | Facility: CLINIC | Age: 72
End: 2022-08-19

## 2022-08-19 DIAGNOSIS — K50.018 CROHN'S DISEASE OF SMALL INTESTINE WITH OTHER COMPLICATION (HCC): Primary | ICD-10-CM

## 2022-08-19 RX ORDER — USTEKINUMAB 90 MG/ML
90 INJECTION, SOLUTION SUBCUTANEOUS
Qty: 1 ML | Refills: 7 | Status: SHIPPED | OUTPATIENT
Start: 2022-08-19

## 2022-08-19 NOTE — TELEPHONE ENCOUNTER
Patient does not require a prior authorization [facility] until January 2023    Ref # [id number]

## 2022-08-19 NOTE — TELEPHONE ENCOUNTER
She was approved and and I set her upfor [facility] nurses to call her to teach her how to inject herself   Can you please send in an RX for stelara for September and send to [facility] mail order plz and ty

## 2022-09-28 ENCOUNTER — TELEPHONE (OUTPATIENT)
Dept: HEMATOLOGY ONCOLOGY | Facility: CLINIC | Age: 72
End: 2022-09-28

## 2022-09-28 NOTE — TELEPHONE ENCOUNTER
Patient calling in requesting to schedule a ride with [employer] next week 10/5 to have her labs completed  Spoke with [place] from [place], patient ride has been schedule for 10/5, patient was informed she will receive a call to confirm  Patient verbalized understanding

## 2022-10-05 ENCOUNTER — APPOINTMENT (OUTPATIENT)
Dept: LAB | Facility: CLINIC | Age: 72
End: 2022-10-05
Payer: COMMERCIAL

## 2022-10-05 DIAGNOSIS — I10 HYPERTENSION, UNSPECIFIED TYPE: ICD-10-CM

## 2022-10-05 DIAGNOSIS — E03.9 MYXEDEMA HEART DISEASE: ICD-10-CM

## 2022-10-05 DIAGNOSIS — I51.9 MYXEDEMA HEART DISEASE: ICD-10-CM

## 2022-10-05 DIAGNOSIS — E78.5 HYPERLIPOPROTEINEMIA: ICD-10-CM

## 2022-10-05 DIAGNOSIS — D47.2 SMOLDERING MULTIPLE MYELOMA (SMM): ICD-10-CM

## 2022-10-05 DIAGNOSIS — E11.9 DIABETES MELLITUS WITHOUT COMPLICATION (HCC): ICD-10-CM

## 2022-10-05 LAB
ALBUMIN SERPL BCP-MCNC: 3 G/DL (ref 3.5–5)
ALP SERPL-CCNC: 115 U/L (ref 46–116)
ALT SERPL W P-5'-P-CCNC: 52 U/L (ref 12–78)
ANION GAP SERPL CALCULATED.3IONS-SCNC: 5 MMOL/L (ref 4–13)
AST SERPL W P-5'-P-CCNC: 24 U/L (ref 5–45)
BASOPHILS # BLD AUTO: 0.05 THOUSANDS/ΜL (ref 0–0.1)
BASOPHILS NFR BLD AUTO: 1 % (ref 0–1)
BILIRUB SERPL-MCNC: 0.43 MG/DL (ref 0.2–1)
BUN SERPL-MCNC: 16 MG/DL (ref 5–25)
CALCIUM ALBUM COR SERPL-MCNC: 10.6 MG/DL (ref 8.3–10.1)
CALCIUM SERPL-MCNC: 9.8 MG/DL (ref 8.3–10.1)
CHLORIDE SERPL-SCNC: 103 MMOL/L (ref 96–108)
CHOLEST SERPL-MCNC: 168 MG/DL
CO2 SERPL-SCNC: 27 MMOL/L (ref 21–32)
CREAT SERPL-MCNC: 0.98 MG/DL (ref 0.6–1.3)
EOSINOPHIL # BLD AUTO: 0.06 THOUSAND/ΜL (ref 0–0.61)
EOSINOPHIL NFR BLD AUTO: 1 % (ref 0–6)
ERYTHROCYTE [DISTWIDTH] IN BLOOD BY AUTOMATED COUNT: 13 % (ref 11.6–15.1)
EST. AVERAGE GLUCOSE BLD GHB EST-MCNC: 134 MG/DL
GFR SERPL CREATININE-BSD FRML MDRD: 59 ML/MIN/1.73SQ M
GLUCOSE P FAST SERPL-MCNC: 116 MG/DL (ref 65–99)
HBA1C MFR BLD: 6.3 %
HCT VFR BLD AUTO: 45.1 % (ref 34.8–46.1)
HDLC SERPL-MCNC: 128 MG/DL
HGB BLD-MCNC: 14.2 G/DL (ref 11.5–15.4)
IMM GRANULOCYTES # BLD AUTO: 0.01 THOUSAND/UL (ref 0–0.2)
IMM GRANULOCYTES NFR BLD AUTO: 0 % (ref 0–2)
LDLC SERPL CALC-MCNC: 22 MG/DL (ref 0–100)
LDLC SERPL DIRECT ASSAY-MCNC: 40 MG/DL (ref 0–100)
LYMPHOCYTES # BLD AUTO: 2.37 THOUSANDS/ΜL (ref 0.6–4.47)
LYMPHOCYTES NFR BLD AUTO: 45 % (ref 14–44)
MCH RBC QN AUTO: 29.4 PG (ref 26.8–34.3)
MCHC RBC AUTO-ENTMCNC: 31.5 G/DL (ref 31.4–37.4)
MCV RBC AUTO: 93 FL (ref 82–98)
MONOCYTES # BLD AUTO: 0.62 THOUSAND/ΜL (ref 0.17–1.22)
MONOCYTES NFR BLD AUTO: 12 % (ref 4–12)
NEUTROPHILS # BLD AUTO: 2.18 THOUSANDS/ΜL (ref 1.85–7.62)
NEUTS SEG NFR BLD AUTO: 41 % (ref 43–75)
NONHDLC SERPL-MCNC: 40 MG/DL
NRBC BLD AUTO-RTO: 0 /100 WBCS
PLATELET # BLD AUTO: 305 THOUSANDS/UL (ref 149–390)
PMV BLD AUTO: 11.6 FL (ref 8.9–12.7)
POTASSIUM SERPL-SCNC: 3.9 MMOL/L (ref 3.5–5.3)
PROT SERPL-MCNC: 8.4 G/DL (ref 6.4–8.4)
RBC # BLD AUTO: 4.83 MILLION/UL (ref 3.81–5.12)
SODIUM SERPL-SCNC: 135 MMOL/L (ref 135–147)
TRIGL SERPL-MCNC: 89 MG/DL
TSH SERPL DL<=0.05 MIU/L-ACNC: 2.32 UIU/ML (ref 0.45–4.5)
WBC # BLD AUTO: 5.29 THOUSAND/UL (ref 4.31–10.16)

## 2022-10-05 PROCEDURE — 86334 IMMUNOFIX E-PHORESIS SERUM: CPT

## 2022-10-05 PROCEDURE — 83721 ASSAY OF BLOOD LIPOPROTEIN: CPT

## 2022-10-05 PROCEDURE — 80053 COMPREHEN METABOLIC PANEL: CPT

## 2022-10-05 PROCEDURE — 85025 COMPLETE CBC W/AUTO DIFF WBC: CPT

## 2022-10-05 PROCEDURE — 83521 IG LIGHT CHAINS FREE EACH: CPT

## 2022-10-05 PROCEDURE — 84443 ASSAY THYROID STIM HORMONE: CPT

## 2022-10-05 PROCEDURE — 84165 PROTEIN E-PHORESIS SERUM: CPT

## 2022-10-05 PROCEDURE — 36415 COLL VENOUS BLD VENIPUNCTURE: CPT

## 2022-10-05 PROCEDURE — 83036 HEMOGLOBIN GLYCOSYLATED A1C: CPT

## 2022-10-05 PROCEDURE — 80061 LIPID PANEL: CPT

## 2022-10-06 ENCOUNTER — TELEPHONE (OUTPATIENT)
Dept: GASTROENTEROLOGY | Facility: CLINIC | Age: 72
End: 2022-10-06

## 2022-10-06 LAB
ALBUMIN SERPL ELPH-MCNC: 3.69 G/DL (ref 3.5–5)
ALBUMIN SERPL ELPH-MCNC: 45.6 % (ref 52–65)
ALPHA1 GLOB SERPL ELPH-MCNC: 0.43 G/DL (ref 0.1–0.4)
ALPHA1 GLOB SERPL ELPH-MCNC: 5.3 % (ref 2.5–5)
ALPHA2 GLOB SERPL ELPH-MCNC: 1.06 G/DL (ref 0.4–1.2)
ALPHA2 GLOB SERPL ELPH-MCNC: 13.1 % (ref 7–13)
BETA GLOB ABNORMAL SERPL ELPH-MCNC: 0.5 G/DL (ref 0.4–0.8)
BETA1 GLOB SERPL ELPH-MCNC: 6.2 % (ref 5–13)
BETA2 GLOB SERPL ELPH-MCNC: 4.4 % (ref 2–8)
BETA2+GAMMA GLOB SERPL ELPH-MCNC: 0.36 G/DL (ref 0.2–0.5)
GAMMA GLOB ABNORMAL SERPL ELPH-MCNC: 2.06 G/DL (ref 0.5–1.6)
GAMMA GLOB SERPL ELPH-MCNC: 25.4 % (ref 12–22)
IGG/ALB SER: 0.84 {RATIO} (ref 1.1–1.8)
INTERPRETATION UR IFE-IMP: NORMAL
KAPPA LC FREE SER-MCNC: 27.8 MG/L (ref 3.3–19.4)
KAPPA LC FREE/LAMBDA FREE SER: 0.75 {RATIO} (ref 0.26–1.65)
LAMBDA LC FREE SERPL-MCNC: 36.9 MG/L (ref 5.7–26.3)
M PROTEIN 1 MFR SERPL ELPH: 13.7 %
M PROTEIN 1 SERPL ELPH-MCNC: 1.11 G/DL
PROT PATTERN SERPL ELPH-IMP: ABNORMAL
PROT SERPL-MCNC: 8.1 G/DL (ref 6.4–8.2)

## 2022-10-06 PROCEDURE — 84165 PROTEIN E-PHORESIS SERUM: CPT | Performed by: PATHOLOGY

## 2022-10-06 PROCEDURE — 86334 IMMUNOFIX E-PHORESIS SERUM: CPT | Performed by: PATHOLOGY

## 2022-10-06 NOTE — TELEPHONE ENCOUNTER
Patients GI provider:  Dr Violeta Henderson    Number to return call: [phone]    Reason for call: Pt calling requesting to speak with a nurse regarding her stelara infusion      Scheduled procedure/appointment date if applicable: N/A

## 2022-10-06 NOTE — TELEPHONE ENCOUNTER
Pt thought the last infusion she received was entyvio, she didn't realize it was stelara so she hadnt scheduled the nurses to come out to show her stelara inj  She was due 9/20, she is going to call them today to have them come out   Is there anything you need done since she is late in gettting first inj?

## 2022-10-06 NOTE — TELEPHONE ENCOUNTER
She already received the Stelara infusion and should now be receiving the Stelara injections every 8 weeks (first dose was due on 9/20 per review of records) - can you please reach out to the patient to ensure everything is going well? Thank you!

## 2022-10-12 NOTE — TELEPHONE ENCOUNTER
Nurse Navigator called to report that patient will receive meds on 10/14 and they will be doing the training on 10/17

## 2022-10-12 NOTE — TELEPHONE ENCOUNTER
Patient called and wanted to let Gideon Watt know she found the place for her stelaro injection  Thank you!

## 2022-10-12 NOTE — TELEPHONE ENCOUNTER
Spoke to pt, she stated her nurse navigator Henry Mak told her she needed prior auth for inj, that was already obtained  Called nurse navigators   [phone]  her ext is [phone], wasn't able to talk to her but will check in with her tomorrow   I think they had wrong phamaracy listed, pt had not set up the delivery but I advised her to set that up, and I called the pharmacy and confirmed itll be set up for delivery by Friday, pt did call to set it up, and I confirmed with them its good to go

## 2022-10-21 ENCOUNTER — TELEPHONE (OUTPATIENT)
Dept: OTHER | Facility: OTHER | Age: 72
End: 2022-10-21

## 2022-10-21 NOTE — TELEPHONE ENCOUNTER
This message is for Dr Ernestina Ward "Pt was trained and injected her Stelara on Oct 17 th  It was late   If you have any questions feel free to call "

## 2022-10-23 NOTE — PROGRESS NOTES
HEMATOLOGY CLINIC NOTE    Primary Care Provider: Le Lux MD  Referring Provider: Rony Castro  MRN: [id number]  : 1950    Assessment / Plan:   1  Smoldering multiple myeloma (SMM)  This is a 72-year-old female who presented to our clinic 2021 for iron deficiency anemia  It was found that she also had elevated protein despite low albumin, hypercalcemia, elevated alk-phos  She was worked up for possible plasma neoplasm  SPEP M spike was found IgG lambda at 1 03  Bone survey showed no bone lesions in 2021  A bone marrow biopsy was obtained showing smoldering multiple myeloma plasma cell neoplasm 15%  She is currently on observation  Last lab work 10/2022: WBC 5 29, Hgb 14 2, MCV 93, PLT 305K, diff acceptable  CMP showed corrected calcium 10 6, rest acceptable  SPEP showed M spike 1 11 (mild increase)  Kappa 27 8, Lambda 36 9, K/L ratio 0 75  She mentioned to me that she has some right upper thigh and lower leg pain which has been chronic  Dull type of pain  No leg swelling  Otherwise, she has no other possible bone pain or constitutional symptoms  We will obtain a CT low-dose scan for myeloma to ensure no bone lesions  Otherwise, will repeat labs in 6 months with a follow-up then  No indication for treatment at this time     - CT low dose whole body myeloma scan; Future  - CBC and differential; Future  - Comprehensive metabolic panel; Future  - Immunoglobulin free LT chains blood; Future  - IgG, IgA, IgM; Future  - Protein electrophoresis, serum; Future    2  Right leg pain  As above  - CT low dose whole body myeloma scan; Future    3  Crohn's disease of small intestine with other complication ([facility] )  Well-controlled per patient  No bleeding anywhere  4  Low iron  Iron panel largely normal   Does suggest some chronic disease with elevated ferritin and low other iron indices  We will continue to monitor for now with iron panel in 6 months      · Discussion of decision making    I personally reviewed the following lab results, the image studies, pathology, other specialty/physicians consult notes and recommendations, and outside medical records from [facility]  I had a lengthy discussion with the patient and shared the work-up findings  I spent 30 minutes reviewing the records (labs, clinician notes, outside records, medical history, ordering medicine/tests/procedures, interpreting the imaging/labs previously done) and coordination of care as well as direct time with the patient today, of which greater than 50% of the time was spent in counseling and coordination of care with the patient/family  · Plan/Labs  · CT myeloma scan to be done shortly  · CBC, CMP, SPEP, serum free light chain, immunoglobulin testing to be repeated in 6 months  · No indication for treatment at this time  Will continue to monitor  · Iron will be repeated in 6 months  Follow Up: 6 months     All questions were answered to the patient's satisfaction during this encounter  The patient knows the contact information for our office and knows to reach out for any relevant concerns related to this encounter  They are to call for any temperature 100 4 or higher, new symptoms including but not restricted to shaking chills, decreased appetite, nausea, vomiting, diarrhea, increased fatigue, shortness of breath or chest pain, confusion, and not feeling the strength to come to the clinic  For all other listed problems and medical diagnosis in their chart - they are managed by PCP and/or other specialists, which the patient acknowledges  Thank you very much for your consultation and making us a part of this patient's care  We are continuing to follow closely with you  Please do not hesitate to reach out to me with any additional questions or concerns       Reason for visit:       Chief Complaint   Patient presents with   • Follow-up     6 month        History of Hematology Illness: Jason Jama is a 73 y o  female who came in for follow up  1  Smoldering Multiple Myeloma, IgG Lambda  - 8/2021 SPEP --> M spike 1 03  K/L ratio 0 78  SPEP ordered because of high total protein at 9 2, albumin 3 2  Corrected calcium 10 4, alk phos 132  PTH WNL  Bone survey negative for lytic lesions  Immunoglobulin free light chain showed normal kappa/lambda ratio  Prompted BMBx --> 10/2021 --> BMBx --> 15% plasma cell neoplasm found with 15% lambda restricted plasma cells in normocellular marrow (40% cellularity)      - 10/2022: WBC 5 29, Hgb 14 2, MCV 93, PLT 305K, diff acceptable  CMP showed corrected calcium 10 6, rest acceptable  SPEP showed M spike 1  11  Kappa 27 8, Lambda 36 9, K/L ratio 0 75       2  History of Iron Deficiency Anemia   - secondary to Chronic GI bleeding, malabsorption from Crohn's   - 8/18/2021: Hgb 13 7g/dL, MCV 87    -8/27/2021: Ferritin 20, iron 55, TIBC 446, iron sat 12%  - 2019: last colonoscopy unremarkable per pt  - 9/2021: S/P Venofer 300mg x2  - 3/2022: Hgb 12 4g/dL, MCV 92  Ferritin 20, iron 31, TIBC 404, iron sat 8%  -10/2022: Hgb 14 2g/dL, MCV 93  Ferritin 193, iron 43, TIBC 406, iron sat 11%    3  Crohn's Disease  - was being treated with Vedolizumab until 4/2022       4  Mild Reactive Thrombocytosis, now resolved    - since at least 8/2020  - ranges in high 300s-400s  - likely reactive due to iron deficiency + inflammation from Crohn's disease    Interval History:   08/27/21: This is a 78year old female with PMH of Crohn's disease, DM, COPD, Asthma, ocular hypertension presenting for consultation       Patient states she has fatigue, PICA for ice, frequent bleeding from the rectum typical for her Crohn's disease   No shortness of breath, no lightheadedness   No constitutional symptoms such as fever, chills, night sweats, lumps, bumps, sudden weight loss   No abdominal pain   Patient does state she has bone pain in her right femur   Not joint related      Patient's last colonoscopy was 2019, she is due for 1 soon  Kimberlee Nguyen has had Crohn's disease since the 90s   No menstrual bleeding or bleeding into urine   Diet is well balanced   No other malabsorption of conditions   She currently takes B12       She has never had IV iron before or blood transfusion   She currently takes oral iron   Does not smoke or drink  Chan Salm personal history of cancer   No family history of cancer  Kimberlee Nguyen is up-to-date on her cancer screenings      9/17/2021:  Patient came in for follow-up  Denies any complaints  She does not have any constitutional symptoms at this time  Still has some lingering bone pain      "10/22/2021:  Came for follow-up doing okay body weight stable  No bone pain, bleeding easy bruise no other complaints"    4/22/2022:  Patient came in for follow-up  She offers no specific complaints  States she has no constitutional symptoms such as fever, chills, night sweats, lumps, bumps, sudden weight loss  She has no bone pain  Does have joint pain in bilateral knees which is chronic  She has a very good appetite and has actually gained a little bit of weight  She was receiving infusion treatment for Crohn's disease  However, this was stopped earlier this month  10/25/2022:  Patient came in for follow-up  She does mention some right upper thigh and right lower leg pain which is chronic  Otherwise, no other pains  No constitutional symptoms such as fevers, chills, night sweats, enlarged lymphadenopathy, other lumps or bumps, sudden weight loss  Good appetite  Continues to gain some weight  Crohns is stable per patient      Problem list:       Patient Active Problem List   Diagnosis   • Asthma   • COPD (chronic obstructive pulmonary disease) ([facility] )   • Diabetes mellitus ([facility] )   • Ocular hypertension   • Crohn's disease of small intestine with other complication ([facility] )   • Low iron   • Protein-calorie malnutrition, unspecified severity ([facility] )       REVIEW OF SYMPTOMS:   Review of Systems   Constitutional: Negative for activity change, appetite change, chills, diaphoresis, fatigue, fever and unexpected weight change  HENT: Negative for nosebleeds  Eyes: Negative for visual disturbance  Respiratory: Negative for cough and shortness of breath  Cardiovascular: Negative for chest pain, palpitations and leg swelling  Gastrointestinal: Negative for abdominal pain, anal bleeding, blood in stool, constipation, diarrhea, nausea and vomiting  Endocrine: Negative for cold intolerance  Genitourinary: Negative for hematuria, menstrual problem and vaginal bleeding  Musculoskeletal: Positive for arthralgias (Right upper thigh + right lower leg pain)  Skin: Negative for color change, pallor and rash  Neurological: Negative for dizziness, syncope, light-headedness and headaches  Hematological: Negative for adenopathy  Does not bruise/bleed easily  Psychiatric/Behavioral: Negative for sleep disturbance  PHYSICAL EXAMINATION:     Vital Signs:   /82 (BP Location: Left arm, Patient Position: Sitting)   Pulse 75   Resp 14   Ht 5' 3" (1 6 m)   Wt 54 4 kg (120 lb)   SpO2 98%   BMI 21 26 kg/m²   Body surface area is 1 56 meters squared  Ht Readings from Last 8 Encounters:   04/22/22 5' 3" (1 6 m)   03/01/22 5' 3" (1 6 m)   01/04/22 5' 3" (1 6 m)   12/10/21 5' 3" (1 6 m)   10/22/21 5' 3" (1 6 m)   10/13/21 5' 3" (1 6 m)   09/17/21 5' 3" (1 6 m)   08/27/21 5' 3" (1 6 m)       Wt Readings from Last 8 Encounters:   04/22/22 54 4 kg (120 lb)   03/01/22 54 3 kg (119 lb 12 8 oz)   01/04/22 51 6 kg (113 lb 12 8 oz)   12/14/21 48 8 kg (107 lb 9 4 oz)   12/10/21 49 3 kg (108 lb 9 6 oz)   10/22/21 51 3 kg (113 lb)   10/13/21 50 8 kg (112 lb)   09/17/21 53 3 kg (117 lb 8 oz)          Physical Exam  Constitutional:       General: She is not in acute distress  Appearance: Normal appearance  She is not ill-appearing, toxic-appearing or diaphoretic     HENT:      Head: Normocephalic and atraumatic  Eyes:      General: No scleral icterus  Extraocular Movements: Extraocular movements intact  Conjunctiva/sclera: Conjunctivae normal       Pupils: Pupils are equal, round, and reactive to light  Cardiovascular:      Rate and Rhythm: Normal rate and regular rhythm  Pulmonary:      Effort: Pulmonary effort is normal  No respiratory distress  Abdominal:      Tenderness: There is no abdominal tenderness  Musculoskeletal:         General: No tenderness  Normal range of motion  Cervical back: Normal range of motion and neck supple  Right lower leg: No edema  Left lower leg: No edema  Skin:     General: Skin is warm and dry  Coloration: Skin is not jaundiced or pale  Findings: No bruising, erythema, lesion or rash  Neurological:      General: No focal deficit present  Mental Status: She is alert and oriented to person, place, and time  Mental status is at baseline  Motor: No weakness  Psychiatric:         Mood and Affect: Mood normal          Behavior: Behavior normal          Thought Content: Thought content normal          Judgment: Judgment normal        Reviewed historical information        PAST MEDICAL HISTORY:    Past Medical History:   Diagnosis Date   • Asthma    • COPD (chronic obstructive pulmonary disease) ([facility] )    • Crohn's disease ([place] [address] )    • Diabetes mellitus ([place] [address] )    • Hypertension    • Multiple myeloma ([facility] )    • Ocular hypertension        PAST SURGICAL HISTORY:    Past Surgical History:   Procedure Laterality Date   • CATARACT EXTRACTION     • COLON SURGERY      Partial colectomy, partial removal of the rectum   • EXPLORATORY LAPAROTOMY     • ILEOSTOMY     • IR BIOPSY BONE MARROW  10/13/2021         CURRENT MEDICATIONS:     Current Outpatient Medications:   •  albuterol (PROVENTIL HFA,VENTOLIN HFA) 90 mcg/act inhaler, Inhale, Disp: , Rfl:   •  Ascorbic Acid (Vitamin C) 500 MG CAPS, Take by mouth, Disp: , Rfl:   •  bimatoprost (LUMIGAN) 0 01 % ophthalmic drops, Administer 1 drop to both eyes daily at bedtime, Disp: , Rfl:   •  Cyanocobalamin (B-12) 5000 MCG CAPS, Take by mouth, Disp: , Rfl:   •  dorzolamide-timolol (COSOPT) 22 3-6 8 MG/ML ophthalmic solution, 1 drop 2 times daily- 8 am and 8 pm- both eyes, Disp: , Rfl:   •  Entyvio SOLR, 300mg IV on week 0, 2, 6 and then every 8 weeks, Disp: 1 each, Rfl: 6  •  famotidine (PEPCID) 40 MG tablet, , Disp: , Rfl:   •  ferrous sulfate 325 (65 FE) MG EC tablet, Take 325 mg by mouth 3 (three) times a day with meals, Disp: , Rfl:   •  fluticasone (FLONASE) 50 mcg/act nasal spray, , Disp: , Rfl:   •  Folic Acid 20 MG CAPS, Take by mouth  , Disp: , Rfl:   •  ipratropium (ATROVENT) 0 03 % nasal spray,  , Disp: , Rfl:   •  levothyroxine 25 mcg tablet, Take by mouth, Disp: , Rfl:   •  loperamide (IMODIUM) 2 mg capsule, Take 2 mg by mouth 4 (four) times a day as needed for diarrhea, Disp: , Rfl:   •  mometasone (NASONEX) 50 mcg/act nasal spray, 2 sprays into each nostril, Disp: , Rfl:   •  mometasone-formoterol (DULERA) 200-5 MCG/ACT inhaler, Inhale, Disp: , Rfl:   •  Multiple Vitamins-Minerals (OCUTABS-LUTEIN) TABS, Take by mouth, Disp: , Rfl:   •  Nebulizers (Nebulizer Compressor) MISC, Inhale, Disp: , Rfl:   •  omeprazole (PriLOSEC) 20 mg delayed release capsule, , Disp: , Rfl:   •  ondansetron (ZOFRAN) 4 mg tablet, Take 1 tablet (4 mg total) by mouth every 8 (eight) hours as needed for nausea or vomiting, Disp: 60 tablet, Rfl: 0  •  potassium chloride (KLOR-CON M20) 20 mEq tablet, Take 20 mEq by mouth, Disp: , Rfl:   •  simethicone (MYLICON) 80 mg chewable tablet, Chew 80 mg  , Disp: , Rfl:   •  timolol (TIMOPTIC) 0 25 % ophthalmic solution, , Disp: , Rfl:   •  vedolizumab (Entyvio) SOLR, Infuse 300 mg into a venous catheter every 56 days, Disp: 1 each, Rfl: 6  •  Zinc 50 MG CAPS, Take by mouth, Disp: , Rfl:     Family History   Social History     Tobacco Use   • Smoking status: Never Smoker   • Smokeless tobacco: Never Used   Vaping Use   • Vaping Use: Never used   Substance Use Topics   • Alcohol use: No   • Drug use: No   te    WBC 4 27 (L) 0  Family History   Problem Relation Age of Onset   • Diabetes Mother    • Hypertension Mother    3/29/2022    HGB 12 4 03/29/2022    HCT 40 3 03/29/2022    MCV 92 03/29/2022     (H) 03/29/2022   sults   Component Value Date    WBC 4 27 (L) 03/29/2022    HGB 12 4 03/29/2022    HCT 40 3 03/29/2022    MCV 92 03/29/2022     (H) 03/29/2022      Component Value Date    SODIUM 136 03/29/2022    K 3 8 03/29/2022     03/29/2022    CO2 31 03/29/2022    AGAP 2 (L) 03/29/2022    BUN 13 03/29/2022    CREATININE 0 86 03/29/2022    GLUC 90 11/06/2020    GLUF 89 03/29/2022    CALCIUM 9 5 03/29/2022    AST 17 03/29/2022    ALT 21 03/29/2022    ALKPHOS 77 03/29/2022    TP 7 6 03/29/2022    TP 7 4 03/29/2022    TBILI 0 27 03/29/2022    EGFR 69 03/29/2022       IMAGING:  CT small bowel enterography  Narrative: CT ABDOMEN AND PELVIS WITH IV CONTRAST- ENTEROGRAPHY - WITH CONTRAST    INDICATION:   [id number]: Crohn's disease of small intestine with unspecified complications  COMPARISON:  CT abdomen pelvis 8/31/2020    TECHNIQUE:  Contrast-enhanced CT examination of the abdomen and pelvis was performed utilizing thin section technique and after the administration of low density enteric contrast according to protocol designed specifically to obtain sensitive evaluation   of the small bowel  Axial, sagittal, and coronal 2D reformatted images were created from the source data and submitted for interpretation  Radiation dose length product (DLP) for this visit:  433 mGy-cm   This examination, like all CT scans performed in the [facility], was performed utilizing techniques to minimize radiation dose exposure, including the use of iterative   reconstruction and automated exposure control      IV Contrast:  100 mL of iohexol (OMNIPAQUE)  Enteric Contrast: 1350 cc of low density enteric contrast Gerhardt Curio) was administered  FINDINGS:     ENTEROGRAPHY:  -Small bowel distension: Adequate  -Bowel: Diseased bowel segments as follows:      -Segments: 2  *  Length/location: Short segment luminal narrowing at the distal ileum/pouch anastomosis measuring 2 3 cm (series 601 image 44)  Luminal narrowing at the pouch anal anastomosis measuring approximately 2 0 cm (series 601 image 103)  These are similar   to the prior study  *  Segmental mural hyperenhancement: Stratified (bilaminar or trilaminar)  *  Wall thickening: Mild wall thickening of the distal ileum /pouch consistent with active inflammation similar to prior  *  Stricture: Stricture with moderate to severe upstream dilation (>4 cm) similar to prior  *  Ulcerations: Probable  *  Sacculations: Absent  *  Penetrating complication: None  -Mesenteric findings: Probably reactive mesenteric lymph nodes measuring less than 1 cm in short axis diameter similar to prior   -Extraintestinal findings: Cholelithiasis  APPENDIX:  Surgically absent    STOMACH:  Unremarkable  REMAINDER OF THE ABDOMEN AND PELVIS:    ABDOMEN    LOWER CHEST:  No change in mild right basilar atelectasis/scarring with associated bronchiolectasis  LIVER/BILIARY TREE:  One or more subcentimeter sharply circumscribed low-density hepatic lesion(s) are noted, too small to accurately characterize, but statistically most likely to represent subcentimeter hepatic cysts  Hepatic contours are normal     The liver enhances heterogeneously similar to prior  No biliary dilatation  GALLBLADDER:  Cholelithiasis without pericholecystic inflammatory changes  SPLEEN:  Unremarkable  PANCREAS:  Unremarkable  ADRENAL GLANDS:  Unremarkable  KIDNEYS/URETERS:  Unremarkable  No hydronephrosis  ABDOMINOPELVIC CAVITY:  No ascites  No pneumoperitoneum  VESSELS:  Unremarkable for patient's age        PELVIS    REPRODUCTIVE ORGANS: Unremarkable for patient's age  URINARY BLADDER:  Unremarkable  ABDOMINAL WALL/INGUINAL REGIONS:  Unremarkable  OSSEOUS STRUCTURES:  No acute fracture or destructive osseous lesion  Impression: 1  Mild active inflammation in the distal ileum/pouch with two strictures at the distal ileum /pouch anastomosis and ileoanal anastomosis as above  Severe small bowel dilation throughout the abdomen and pelvis  Findings are similar to prior CT  2   Cholelithiasis      Workstation performed: [id number]

## 2022-10-25 ENCOUNTER — OFFICE VISIT (OUTPATIENT)
Dept: HEMATOLOGY ONCOLOGY | Facility: CLINIC | Age: 72
End: 2022-10-25
Payer: COMMERCIAL

## 2022-10-25 VITALS
HEIGHT: 62 IN | RESPIRATION RATE: 18 BRPM | WEIGHT: 119 LBS | DIASTOLIC BLOOD PRESSURE: 62 MMHG | OXYGEN SATURATION: 99 % | BODY MASS INDEX: 21.9 KG/M2 | SYSTOLIC BLOOD PRESSURE: 122 MMHG | HEART RATE: 59 BPM | TEMPERATURE: 98.1 F

## 2022-10-25 DIAGNOSIS — E61.1 LOW IRON: ICD-10-CM

## 2022-10-25 DIAGNOSIS — D47.2 SMOLDERING MULTIPLE MYELOMA (SMM): Primary | ICD-10-CM

## 2022-10-25 DIAGNOSIS — M79.604 RIGHT LEG PAIN: ICD-10-CM

## 2022-10-25 DIAGNOSIS — K50.018 CROHN'S DISEASE OF SMALL INTESTINE WITH OTHER COMPLICATION (HCC): ICD-10-CM

## 2022-10-25 PROCEDURE — 99214 OFFICE O/P EST MOD 30 MIN: CPT | Performed by: INTERNAL MEDICINE

## 2022-11-04 ENCOUNTER — HOSPITAL ENCOUNTER (OUTPATIENT)
Dept: CT IMAGING | Facility: CLINIC | Age: 72
Discharge: HOME | End: 2022-11-04

## 2022-11-04 DIAGNOSIS — M79.604 RIGHT LEG PAIN: ICD-10-CM

## 2022-11-04 DIAGNOSIS — D47.2 SMOLDERING MULTIPLE MYELOMA (SMM): ICD-10-CM

## 2022-11-09 ENCOUNTER — TELEPHONE (OUTPATIENT)
Dept: HEMATOLOGY ONCOLOGY | Facility: CLINIC | Age: 72
End: 2022-11-09

## 2022-11-22 ENCOUNTER — HOSPITAL ENCOUNTER (INPATIENT)
Facility: HOSPITAL | Age: 72
LOS: 6 days | Discharge: HOME | End: 2022-11-28
Attending: COLON & RECTAL SURGERY | Admitting: COLON & RECTAL SURGERY

## 2022-11-22 DIAGNOSIS — K50.018 CROHN'S DISEASE OF SMALL INTESTINE WITH OTHER COMPLICATION (HCC): Primary | ICD-10-CM

## 2022-11-22 DIAGNOSIS — U07.1 COVID-19: ICD-10-CM

## 2022-11-22 RX ORDER — ENOXAPARIN SODIUM 100 MG/ML
40 INJECTION SUBCUTANEOUS DAILY
Status: DISCONTINUED | OUTPATIENT
Start: 2022-11-23 | End: 2022-11-23

## 2022-11-23 LAB
ANION GAP SERPL CALCULATED.3IONS-SCNC: 10 MMOL/L (ref 4–13)
BASOPHILS # BLD AUTO: 0.01 THOUSANDS/ÂΜL (ref 0–0.1)
BASOPHILS NFR BLD AUTO: 0 % (ref 0–1)
BUN SERPL-MCNC: 20 MG/DL (ref 5–25)
CALCIUM SERPL-MCNC: 9.9 MG/DL (ref 8.3–10.1)
CHLORIDE SERPL-SCNC: 103 MMOL/L (ref 96–108)
CO2 SERPL-SCNC: 25 MMOL/L (ref 21–32)
CREAT SERPL-MCNC: 0.91 MG/DL (ref 0.6–1.3)
EOSINOPHIL # BLD AUTO: 0.14 THOUSAND/ÂΜL (ref 0–0.61)
EOSINOPHIL NFR BLD AUTO: 3 % (ref 0–6)
ERYTHROCYTE [DISTWIDTH] IN BLOOD BY AUTOMATED COUNT: 13.4 % (ref 11.6–15.1)
GFR SERPL CREATININE-BSD FRML MDRD: 64 ML/MIN/1.73SQ M
GLUCOSE SERPL-MCNC: 103 MG/DL (ref 65–140)
GLUCOSE SERPL-MCNC: 116 MG/DL (ref 65–140)
GLUCOSE SERPL-MCNC: 130 MG/DL (ref 65–140)
GLUCOSE SERPL-MCNC: 141 MG/DL (ref 65–140)
GLUCOSE SERPL-MCNC: 69 MG/DL (ref 65–140)
GLUCOSE SERPL-MCNC: 80 MG/DL (ref 65–140)
GLUCOSE SERPL-MCNC: 86 MG/DL (ref 65–140)
HCT VFR BLD AUTO: 44.2 % (ref 34.8–46.1)
HGB BLD-MCNC: 14.2 G/DL (ref 11.5–15.4)
IMM GRANULOCYTES # BLD AUTO: 0.06 THOUSAND/UL (ref 0–0.2)
IMM GRANULOCYTES NFR BLD AUTO: 1 % (ref 0–2)
LYMPHOCYTES # BLD AUTO: 0.63 THOUSANDS/ÂΜL (ref 0.6–4.47)
LYMPHOCYTES NFR BLD AUTO: 11 % (ref 14–44)
MAGNESIUM SERPL-MCNC: 2 MG/DL (ref 1.6–2.6)
MCH RBC QN AUTO: 29.2 PG (ref 26.8–34.3)
MCHC RBC AUTO-ENTMCNC: 32.1 G/DL (ref 31.4–37.4)
MCV RBC AUTO: 91 FL (ref 82–98)
MONOCYTES # BLD AUTO: 0.88 THOUSAND/ÂΜL (ref 0.17–1.22)
MONOCYTES NFR BLD AUTO: 16 % (ref 4–12)
NEUTROPHILS # BLD AUTO: 3.9 THOUSANDS/ÂΜL (ref 1.85–7.62)
NEUTS SEG NFR BLD AUTO: 69 % (ref 43–75)
NRBC BLD AUTO-RTO: 0 /100 WBCS
PLATELET # BLD AUTO: 249 THOUSANDS/UL (ref 149–390)
PMV BLD AUTO: 11.1 FL (ref 8.9–12.7)
POTASSIUM SERPL-SCNC: 3.8 MMOL/L (ref 3.5–5.3)
RBC # BLD AUTO: 4.87 MILLION/UL (ref 3.81–5.12)
SODIUM SERPL-SCNC: 138 MMOL/L (ref 135–147)
WBC # BLD AUTO: 5.62 THOUSAND/UL (ref 4.31–10.16)

## 2022-11-23 RX ORDER — DEXTROSE MONOHYDRATE 25 G/50ML
INJECTION, SOLUTION INTRAVENOUS
Status: DISPENSED
Start: 2022-11-23 | End: 2022-11-23

## 2022-11-23 RX ORDER — DORZOLAMIDE HYDROCHLORIDE AND TIMOLOL MALEATE 20; 5 MG/ML; MG/ML
1 SOLUTION/ DROPS OPHTHALMIC DAILY
Status: DISCONTINUED | OUTPATIENT
Start: 2022-11-23 | End: 2022-11-28 | Stop reason: HOSPADM

## 2022-11-23 RX ORDER — ENOXAPARIN SODIUM 100 MG/ML
40 INJECTION SUBCUTANEOUS
Status: DISCONTINUED | OUTPATIENT
Start: 2022-11-23 | End: 2022-11-28 | Stop reason: HOSPADM

## 2022-11-23 RX ORDER — INSULIN LISPRO 100 [IU]/ML
1-5 INJECTION, SOLUTION INTRAVENOUS; SUBCUTANEOUS EVERY 6 HOURS SCHEDULED
Status: DISCONTINUED | OUTPATIENT
Start: 2022-11-23 | End: 2022-11-26

## 2022-11-23 RX ORDER — DEXTROSE, SODIUM CHLORIDE, AND POTASSIUM CHLORIDE 5; .45; .15 G/100ML; G/100ML; G/100ML
84 INJECTION INTRAVENOUS CONTINUOUS
Status: DISCONTINUED | OUTPATIENT
Start: 2022-11-23 | End: 2022-11-28

## 2022-11-23 RX ORDER — DEXTROSE MONOHYDRATE 25 G/50ML
25 INJECTION, SOLUTION INTRAVENOUS ONCE
Status: COMPLETED | OUTPATIENT
Start: 2022-11-23 | End: 2022-11-23

## 2022-11-23 RX ORDER — HYDROMORPHONE HCL/PF 1 MG/ML
0.5 SYRINGE (ML) INJECTION EVERY 4 HOURS PRN
Status: DISCONTINUED | OUTPATIENT
Start: 2022-11-23 | End: 2022-11-28 | Stop reason: HOSPADM

## 2022-11-23 RX ORDER — HYDROMORPHONE HCL IN WATER/PF 6 MG/30 ML
0.2 PATIENT CONTROLLED ANALGESIA SYRINGE INTRAVENOUS EVERY 4 HOURS PRN
Status: DISCONTINUED | OUTPATIENT
Start: 2022-11-23 | End: 2022-11-28 | Stop reason: HOSPADM

## 2022-11-23 RX ORDER — FLUTICASONE PROPIONATE 50 MCG
1 SPRAY, SUSPENSION (ML) NASAL DAILY
Status: DISCONTINUED | OUTPATIENT
Start: 2022-11-23 | End: 2022-11-28 | Stop reason: HOSPADM

## 2022-11-23 RX ORDER — SODIUM CHLORIDE, SODIUM LACTATE, POTASSIUM CHLORIDE, CALCIUM CHLORIDE 600; 310; 30; 20 MG/100ML; MG/100ML; MG/100ML; MG/100ML
75 INJECTION, SOLUTION INTRAVENOUS CONTINUOUS
Status: DISCONTINUED | OUTPATIENT
Start: 2022-11-23 | End: 2022-11-23

## 2022-11-23 RX ORDER — POTASSIUM CHLORIDE 14.9 MG/ML
20 INJECTION INTRAVENOUS ONCE
Status: COMPLETED | OUTPATIENT
Start: 2022-11-23 | End: 2022-11-23

## 2022-11-23 RX ORDER — HEPARIN SODIUM 5000 [USP'U]/ML
5000 INJECTION, SOLUTION INTRAVENOUS; SUBCUTANEOUS EVERY 8 HOURS SCHEDULED
Status: DISCONTINUED | OUTPATIENT
Start: 2022-11-23 | End: 2022-11-23

## 2022-11-23 RX ORDER — FAMOTIDINE 10 MG/ML
20 INJECTION, SOLUTION INTRAVENOUS
Status: DISCONTINUED | OUTPATIENT
Start: 2022-11-23 | End: 2022-11-28 | Stop reason: HOSPADM

## 2022-11-23 RX ADMIN — POTASSIUM CHLORIDE 20 MEQ: 14.9 INJECTION, SOLUTION INTRAVENOUS at 10:08

## 2022-11-23 RX ADMIN — FAMOTIDINE 20 MG: 10 INJECTION, SOLUTION INTRAVENOUS at 10:03

## 2022-11-23 RX ADMIN — DEXTROSE MONOHYDRATE 25 ML: 25 INJECTION, SOLUTION INTRAVENOUS at 11:55

## 2022-11-23 RX ADMIN — DORZOLAMIDE HYDROCHLORIDE AND TIMOLOL MALEATE 1 DROP: 20; 5 SOLUTION/ DROPS OPHTHALMIC at 14:35

## 2022-11-23 RX ADMIN — DEXTROSE, SODIUM CHLORIDE, AND POTASSIUM CHLORIDE 84 ML/HR: 5; .45; .15 INJECTION INTRAVENOUS at 11:46

## 2022-11-23 RX ADMIN — HYDROMORPHONE HYDROCHLORIDE 0.5 MG: 1 INJECTION, SOLUTION INTRAMUSCULAR; INTRAVENOUS; SUBCUTANEOUS at 00:49

## 2022-11-23 RX ADMIN — SODIUM CHLORIDE, SODIUM LACTATE, POTASSIUM CHLORIDE, AND CALCIUM CHLORIDE 75 ML/HR: .6; .31; .03; .02 INJECTION, SOLUTION INTRAVENOUS at 01:08

## 2022-11-23 RX ADMIN — FLUTICASONE PROPIONATE 1 SPRAY: 50 SPRAY, METERED NASAL at 14:35

## 2022-11-23 RX ADMIN — DEXTROSE, SODIUM CHLORIDE, AND POTASSIUM CHLORIDE 84 ML/HR: 5; .45; .15 INJECTION INTRAVENOUS at 23:41

## 2022-11-23 RX ADMIN — ENOXAPARIN SODIUM 40 MG: 40 INJECTION SUBCUTANEOUS at 10:08

## 2022-11-23 NOTE — H&P
H&P - Colorectal Surgery  : Colorectal Surgery Resident role on [employer]  Shey Moore 81 y o  female MRN: [id number]  Unit/Bed#: [facility] 834-01 Encounter: [id number]        Assessment:  81y o  year old female with crohn's disease and a complex past surgical history presents with nausea, vomiting, abdominal pain with concern for ileal anal stricture  COVID positive  Plan:  - NPO/NGT  - PRN analgesia and antiemetics  - GI consult  - [email or web address]  - DVT ppx  HPI:  Shey Moore is a 81 y o  female with a history of Crohns and a complex surgical history  Proctocolectomy w/ ileal pouch in 2007 at [facility] c/b SBO and stenosis of pouch requiring dilation  Recurrent SBO in 2019 s/p dilation complicated by perforation with ex lap and loop ileostomy which was eventually reversed later in 2019  Presents with two days of abdominal pain in her RLQ, N, V, diarrhea  CT at OSH images are unavailable, but impression reads "Progression of oral contrast throughout small bowel to the ileorectal anastomosis   The distal small bowel is markedly dilated, and the anastomosis itself appears narrowed, where there is surrounding inflammation or scarring   All of these findings were present on the prior study dated 09/06/2018, and are less pronounced currently " Also happens to be COVID positive  NGT placed at OSH  She is soft, non-tender on exam        Physical Exam:  General: No acute distress, alert and oriented  CV: Well perfused, tachycardic  Lungs: Normal work of breathing, no increased respiratory effort  Abdomen: Soft, non-tender, non-distended  Extremities: No edema, clubbing or cyanosis  Skin: Warm, dry    Review of Systems   Constitutional: Negative  HENT: Negative  Eyes: Negative  Respiratory: Negative  Cardiovascular: Negative  Gastrointestinal: Positive for abdominal pain, diarrhea, nausea and vomiting  Endocrine: Negative  Genitourinary: Negative  Musculoskeletal: Negative      Skin: Negative  Allergic/Immunologic: Negative  Neurological: Negative  Hematological: Negative  Psychiatric/Behavioral: Negative  Objective       No intake or output data in the 24 hours ending 11/23/22 0017    First Vitals:   Blood Pressure: 154/90 (11/22/22 2321)  Pulse: (!) 111 (11/22/22 2321)  Temperature: 97 9 °F (36 6 °C) (11/22/22 2321)  Respirations: 18 (11/22/22 2321)  SpO2: 96 % (11/22/22 2321)    Current Vitals:   Blood Pressure: 154/90 (11/22/22 2321)  Pulse: (!) 111 (11/22/22 2321)  Temperature: 97 9 °F (36 6 °C) (11/22/22 2321)  Respirations: 18 (11/22/22 2321)  SpO2: 96 % (11/22/22 2321)    Invasive Devices     None                 Imaging: I have personally reviewed pertinent reports  No results found  EKG, Pathology, and Other Studies: I have personally reviewed pertinent reports        Historical Information   Past Medical History:   Diagnosis Date   • Asthma    • COPD (chronic obstructive pulmonary disease) ([facility] )    • Crohn's disease ([facility] )    • Diabetes mellitus ([facility] )    • Hypertension    • Multiple myeloma ([facility] [address] )    • Ocular hypertension      Past Surgical History:   Procedure Laterality Date   • CATARACT EXTRACTION     • COLON SURGERY      Partial colectomy, partial removal of the rectum   • EXPLORATORY LAPAROTOMY     • ILEOSTOMY     • IR BIOPSY BONE MARROW  10/13/2021     Social History   Social History     Substance and Sexual Activity   Alcohol Use No     Social History     Substance and Sexual Activity   Drug Use No     Social History     Tobacco Use   Smoking Status Never   Smokeless Tobacco Never     Family History   Problem Relation Age of Onset   • Diabetes Mother    • Hypertension Mother        Meds/Allergies   all current active meds have been reviewed, current meds:   Current Facility-Administered Medications   Medication Dose Route Frequency   • enoxaparin (LOVENOX) subcutaneous injection 40 mg  40 mg Subcutaneous Daily   • HYDROmorphone (DILAUDID) injection 0 5 mg  0 5 mg Intravenous Q4H PRN   • HYDROmorphone HCl (DILAUDID) injection 0 2 mg  0 2 mg Intravenous Q4H PRN    and PTA meds:   Prior to Admission Medications   Prescriptions Last Dose Informant Patient Reported? Taking? Ascorbic Acid (Vitamin C) 500 MG CAPS   Yes No   Sig: Take by mouth   Cyanocobalamin (B-12) 5000 MCG CAPS   Yes No   Sig: Take by mouth   Entyvio SOLR   No No   Simg IV on week 0, 2, 6 and then every 8 weeks   Folic Acid 20 MG CAPS   Yes No   Sig: Take by mouth     Multiple Vitamins-Minerals (OCUTABS-LUTEIN) TABS   Yes No   Sig: Take by mouth   Nebulizers (Nebulizer Compressor) MISC   Yes No   Sig: Inhale   Zinc 50 MG CAPS   Yes No   Sig: Take by mouth   albuterol (PROVENTIL HFA,VENTOLIN HFA) 90 mcg/act inhaler   Yes No   Sig: Inhale   atorvastatin (LIPITOR) 10 mg tablet   Yes No   Sig: Take 10 mg by mouth in the morning     bimatoprost (LUMIGAN) 0 01 % ophthalmic drops   Yes No   Sig: Administer 1 drop to both eyes daily at bedtime   dorzolamide-timolol (COSOPT) 22 3-6 8 MG/ML ophthalmic solution   Yes No   Si drop 2 times daily- 8 am and 8 pm- both eyes   famotidine (PEPCID) 40 MG tablet   Yes No   ferrous sulfate 325 (65 FE) MG EC tablet   Yes No   Sig: Take 325 mg by mouth 3 (three) times a day with meals   fluticasone (FLONASE) 50 mcg/act nasal spray   Yes No   ipratropium (ATROVENT) 0 03 % nasal spray   Yes No   Sig:     levothyroxine 25 mcg tablet   Yes No   Sig: Take by mouth   loperamide (IMODIUM) 2 mg capsule   Yes No   Sig: Take 2 mg by mouth 4 (four) times a day as needed for diarrhea   mometasone (NASONEX) 50 mcg/act nasal spray   Yes No   Si sprays into each nostril   mometasone-formoterol (DULERA) 200-5 MCG/ACT inhaler   Yes No   Sig: Inhale   omeprazole (PriLOSEC) 20 mg delayed release capsule   Yes No   ondansetron (ZOFRAN) 4 mg tablet   No No   Sig: Take 1 tablet (4 mg total) by mouth every 8 (eight) hours as needed for nausea or vomiting   potassium chloride (K-DUR,KLOR-CON) 20 mEq tablet   Yes No   Sig: Take 20 mEq by mouth   simethicone (MYLICON) 80 mg chewable tablet   Yes No   Sig: Chew 80 mg     timolol (TIMOPTIC) 0 25 % ophthalmic solution   Yes No   ustekinumab (Stelara) 90 mg/mL subcutaneous injection   No No   Sig: Inject 1 mL (90 mg total) under the skin every 56 days   vedolizumab (Entyvio) SOLR   No No   Sig: Infuse 300 mg into a venous catheter every 56 days      Facility-Administered Medications: None     Allergies   Allergen Reactions   • Brimonidine      Red eyes   • Tramadol Nausea Only       Lab Results: I have personally reviewed pertinent lab results  , CBC: No results found for: WBC, HGB, HCT, MCV, PLT, ADJUSTEDWBC, MCH, MCHC, RDW, MPV, NRBC, CMP: No results found for: SODIUM, K, CL, CO2, ANIONGAP, BUN, CREATININE, GLUCOSE, CALCIUM, AST, ALT, ALKPHOS, PROT, BILITOT, EGFR    Counseling / Coordination of Care  Total floor / unit time spent today 25 minutes  Greater than 50% of total time was spent with the patient and / or family counseling and / or coordination of care        Zuri Hinds MD  11/23/2022 12:17 AM

## 2022-11-23 NOTE — CONSULTS
Consult Service: Gastroenterology    PATIENT INFORMATION      Carol Earl 81 y o  female MRN: [id number]  Unit/Bed#: [facility] 834-01 Encounter: [id number]  PCP: Betina Beach DO  Date of Admission:  11/22/2022  Date of Consultation: 11/23/22  Requesting Physician: Yusuf Hartmann MD     ASSESSMENTS & PLAN     Ms Carol Earl is a 81 y o  old female with PMHx of Crohn's disease s/p total proctocolectomy w/ ileoanal J pouch (7624) complicated by SBO and pouch stenosis requiring dilation (2015), recurrent SBO (2019) with stricture s/p dilation complicated by perforation with ex lap and loop ileostomy with eventual reversal presenting with abdominal pain in RLQ with N/V/D x 2 days  GI consulted for Crohn's disease with concern for ileal anal stricture  Found to be COVID positive      #Crohn's disease of small intestine w/ concern for ileal anal stricture  -S/p total proctocolectomy w/ ileoanal J pouch (9464) complicated by SBO and pouch stenosis requiring dilation (2015), recurrent SBO (2019) with stricture s/p dilation complicated by perforation with ex lap and loop ileostomy with eventual reversal  -CT enterography in February 2022 showed active inflammation in the distal ileum and pouch in 2 strictures in the distal ileum/pouch anastomosis  -Entyvio (q4 weeks) level therapeutic in June, still experiencing   -Unclear if patient is experiencing symptoms chronically or if acutely worsening, will need further imaging to assess degree of ileal pouch anal anastomosis stricturing      Plan:   -Recommend repeat CT enterography, MRI enterography   -Plan for endoscopy   -Seen by Colorectal surgery: possible repeat pouch exam to determine degree of ileal pouch anal anastomosis stricturing  -Prednisone not likely to improve clinical picture at this time  -NPO, NGT (-150)  -PRN analgesia and antiemetics  -LR @ 75 mL/hr    REASON FOR CONSULTATION      Crohn's disease, concern for ileal anal stricture    HISTORY OF PRESENT ILLNESS      Carol Earl is a 81 y o  female with PMHx of SMM, GERD, asthma, and IBD initially diagnosed with UC s/p total proctocolectomy w/ ileoanal J pouch (4260) complicated by SBO and pouch stenosis requiring dilation (2015), recurrent SBO (2019) with stricture at pouch anal anastomosis s/p dilation complicated by perforation with ex lap and loop ileostomy with eventual reversal  Colonoscopy in March of 2020 showed anal stricture that was dilated and aphthous ulcerations in the a ferritin limb concerning for Crohn's disease  Patient presented with abdominal pain in RLQ with N/V/D x 2 days  Found to be COVID positive  GI consulted for Crohn's disease with concern for ileal anal stricture  Patient states her symptoms started this past Saturday after eating a Thanksgiving meal and has been unable to tolerate PO intake other than liquids  Patient states her diarrhea is typical for her but reports it has been more liquid and green  Endorses chills but denies chest pain, SOB, difficulty breathing  Seen by GI outpatient in March 2022  Patient on and off Enytvio (now every 4 weeks) with multiple prednisone tapers in the past with symptom improvement  Patient was seen at [place] [address] ED 11/21 and later transferred to [facility] for colorectal surgery evaluation due to complicated Crohn's and surgical history  Impression of CT abdomen/pelvis completed at [facility] "progression of oral contrast throughout small bowel to the ileorectal anastomosis  The distal small bowel is markedly dilated, and the anastomosis itself appears narrowed, where there is surrounding inflammation or scarring  All of these findings were present on the prior study dated 09/06/2018, and are less pronounced currently " NGT placed before transfer  Since seen by colorectal surgery at [facility], placed NPO, LR @ 75 mL/hr, analgesics and antiemetics  On examination today, vital signs within normal limits, afebrile   Patient states he abdominal pain has improved but still present in RLQ  Denies N/V/D at this time  Endorses feeling weak  CBC and BMP wnl  Gastrointestinal ROS: positive for - chills, abdominal pain, appetite loss, change in stools, diarrhea and nausea/vomiting  negative for - blood in stools, constipation, hematemesis, melena, stool incontinence or swallowing difficulty/pain    REVIEW OF SYSTEMS     A thorough 12-point review of systems has been conducted  Pertinent positives and negatives are mentioned in the history of present illness  PAST MEDICAL & SURGICAL HISTORY      Past Medical History:   Diagnosis Date   • Asthma    • COPD (chronic obstructive pulmonary disease) ([facility] )    • Crohn's disease ([place] [address] )    • Diabetes mellitus ([place] [address] )    • Hypertension    • Multiple myeloma ([place] [address] )    • Ocular hypertension        Past Surgical History:   Procedure Laterality Date   • CATARACT EXTRACTION     • COLON SURGERY      Partial colectomy, partial removal of the rectum   • EXPLORATORY LAPAROTOMY     • ILEOSTOMY     • IR BIOPSY BONE MARROW  10/13/2021     MEDICATIONS & ALLERGIES       Medications:   Prior to Admission medications    Medication Sig Start Date End Date Taking?  Authorizing Provider   Ascorbic Acid (Vitamin C) 500 MG CAPS Take by mouth   Yes Historical Provider, MD   bimatoprost (LUMIGAN) 0 01 % ophthalmic drops Administer 1 drop to both eyes daily at bedtime   Yes Historical Provider, MD   dorzolamide-timolol (COSOPT) 22 3-6 8 MG/ML ophthalmic solution 1 drop 2 times daily- 8 am and 8 pm- both eyes 1/24/18  Yes Historical Provider, MD   famotidine (PEPCID) 40 MG tablet  8/26/21  Yes Historical Provider, MD   ferrous sulfate 325 (65 FE) MG EC tablet Take 325 mg by mouth 3 (three) times a day with meals   Yes Historical Provider, MD   fluticasone (FLONASE) 50 mcg/act nasal spray  5/26/21  Yes Historical Provider, MD   ipratropium (ATROVENT) 0 03 % nasal spray   8/26/21  Yes Historical Provider, MD   levothyroxine 25 mcg tablet Take by mouth 8/28/18  Yes Historical Provider, MD   loperamide (IMODIUM) 2 mg capsule Take 2 mg by mouth 4 (four) times a day as needed for diarrhea   Yes Historical Provider, MD   Multiple Vitamins-Minerals (OCUTABS-LUTEIN) TABS Take by mouth   Yes Historical Provider, MD   omeprazole (PriLOSEC) 20 mg delayed release capsule  8/27/21  Yes Historical Provider, MD   ondansetron (ZOFRAN) 4 mg tablet Take 1 tablet (4 mg total) by mouth every 8 (eight) hours as needed for nausea or vomiting 9/19/20  Yes Travis Can PA-C   potassium chloride (K-DUR,KLOR-CON) 20 mEq tablet Take 20 mEq by mouth 7/15/15  Yes Historical Provider, MD   simethicone (MYLICON) 80 mg chewable tablet Chew 80 mg     Yes Historical Provider, MD   albuterol (PROVENTIL HFA,VENTOLIN HFA) 90 mcg/act inhaler Inhale 6/21/18   Historical Provider, MD   atorvastatin (LIPITOR) 10 mg tablet Take 10 mg by mouth in the morning  Patient not taking: Reported on 11/23/2022    Historical Provider, MD   Cyanocobalamin (B-12) 5000 MCG CAPS Take by mouth    Historical Provider, MD Coleman Flower SOLR 300mg IV on week 0, 2, 6 and then every 8 weeks 4/28/21   Travis Can PA-C   Folic Acid 20 MG CAPS Take by mouth      Historical Provider, MD   mometasone (NASONEX) 50 mcg/act nasal spray 2 sprays into each nostril 8/28/18   Historical Provider, MD   mometasone-formoterol Shruti Fatuma) 200-5 MCG/ACT inhaler Inhale    Historical Provider, MD   Nebulizers (Nebulizer Compressor) MISC Inhale 9/1/20   Historical Provider, MD   timolol (TIMOPTIC) 0 25 % ophthalmic solution     Historical Provider, MD   ustekinumab (Stelara) 90 mg/mL subcutaneous injection Inject 1 mL (90 mg total) under the skin every 56 days 8/19/22   Flor Khalil MD   vedolizumab Amarilys [place]) SOLR Infuse 300 mg into a venous catheter every 56 days 4/28/21   Travis Can PA-C   Zinc 50 MG CAPS Take by mouth    Historical Provider, MD       Allergies:    Allergies   Allergen Reactions   • Brimonidine      Red eyes   • Tramadol Nausea Only     SOCIAL HISTORY      Marital Status: Unknown    Substance Use History:   Social History     Substance and Sexual Activity   Alcohol Use No     Social History     Tobacco Use   Smoking Status Never   Smokeless Tobacco Never     Social History     Substance and Sexual Activity   Drug Use No       FAMILY HISTORY      Non-Contributory    PHYSICAL EXAM     Vitals:   Blood Pressure: 131/80 (11/23/22 0237)  Pulse: 95 (11/23/22 0237)  Temperature: 97 9 °F (36 6 °C) (11/23/22 0237)  Respirations: 18 (11/23/22 0237)  Height: 5' 2" (157 5 cm) (11/22/22 2321)  Weight - Scale: 54 kg (119 lb) (11/22/22 2321)  SpO2: 99 % (11/23/22 0237)    Physical Exam:   GENERAL: NAD  HEENT:  NC/AT, no scleral icterus, NGT in place  CARDIAC:  RRR, +S1/S2  PULMONARY:  CTA B/L, no wheezing/rales/rhonci, non-labored breathing  ABDOMEN:  Soft, +BS, no rebound/guarding/rigidity, tenderness to palpation in RLQ, mildly distended  EXTREMITIES:  No edema, cyanosis, or clubbing  NEUROLOGIC:  Alert  SKIN:  No rashes or erythema    ADDITIONAL DATA     Lab Results:       Lab Units 11/23/22  0112 10/05/22  0728 05/27/22  1007 03/29/22  1002 12/10/21  1026   SODIUM mmol/L 138 135 131* 136 131*   POTASSIUM mmol/L 3 8 3 9 4 6 3 8 3 5   CHLORIDE mmol/L 103 103 97* 103 94*   CO2 mmol/L 25 27 32 31 25   BUN mg/dL 20 16 25 13 24   CREATININE mg/dL 0 91 0 98 1 20 0 86 0 99   GLUCOSE RANDOM mg/dL 116  --   --   --   --    CALCIUM mg/dL 9 9 9 8 10 6* 9 5 9 6   MAGNESIUM mg/dL 2 0  --   --   --   --             Lab Units 10/05/22  0728 05/27/22  1007 03/29/22  1002 12/10/21  1026 08/27/21  1151 08/18/21  1040   TOTAL PROTEIN g/dL 8 4  8 1 9 2* 7 6  7 4 8 3*   < > 9 2*   ALBUMIN g/dL 3 0* 3 3* 2 9* 2 9*  --  3 2*   TOTAL BILIRUBIN mg/dL 0 43 0 93 0 27 0 50  --  0 66   AST U/L 24 22 17 14  --  25   ALT U/L 52 29 21 26  --  34   ALK PHOS U/L 115 127* 77 110  --  132*    < > = values in this interval not displayed             Lab Units 11/23/22  0112 10/05/22  0728 06/30/22  0744 05/27/22  1007 03/29/22  1002   WBC Thousand/uL 5 62 5 29 5 12 5 83 4 27*   HEMOGLOBIN g/dL 14 2 14 2 13 3 15 1 12 4   HEMATOCRIT % 44 2 45 1 43 2 47 4* 40 3   PLATELETS Thousands/uL 249 305 397* 357 398*   MCV fL 91 93 93 87 92       Lab Results   Component Value Date    IRON 43 (L) 10/05/2022    TIBC 406 10/05/2022    FERRITIN 193 10/05/2022       No results found for: INR, PROTIME    Microbiology Results:      Imaging:  No results found  Narrative/Impressions - 3 day look back     EKG, Pathology, and Other Studies Reviewed on Admission:   · EKG: Reviewed    Counseling / Coordination of Care Time: 30 total mins spent n consult  Greater than 50% of total time spent on patient counseling and coordination of care    Julia Frazier MD, [address] [facility] Internal Medicine Residency PGY-1  Recommendations not final until attending attestation

## 2022-11-23 NOTE — UTILIZATION REVIEW
Initial Clinical Review    Admission: Date/Time/Statement:   Admission Orders (From admission, onward)     Ordered        11/22/22 2316  Inpatient Admission  Once                      Orders Placed This Encounter   Procedures   • Inpatient Admission     Standing Status:   Standing     Number of Occurrences:   1     Order Specific Question:   Level of Care     Answer:   Med Surg [16]     Order Specific Question:   Estimated length of stay     Answer:   More than 2 Midnights     Order Specific Question:   Certification     Answer:   I certify that inpatient services are medically necessary for this patient for a duration of greater than two midnights  See H&P and MD Progress Notes for additional information about the patient's course of treatment  Initial Presentation: 81 y o  female with PMH of Crohn's disease and complex surgical hx, SBO who initially presented to outside hospital with c/o RLQ abdominal pain with nausea, vomiting and diarrhea  CT at OSH images are unavailable, but impression reads "Progression of oral contrast throughout small bowel to the ileorectal anastomosis   The distal small bowel is markedly dilated, and the anastomosis itself appears narrowed, where there is surrounding inflammation or scarring   All of these findings were present on the prior study dated 09/06/2018, and are less pronounced currently " Findings may be consistent with ileus verses stricture  Also happens to be COVID positive  NGT placed at OSH  Transferred to [address] [place] [address] for GI evaluation and may need repeat pouch exam to determine degree of ileal pouch anal anastomosis stricturing  Admit Inpatient med surg, GI consult, keep NPO, NGT, continue IVF, pain control and antiemetics prn  Date: 11/23   Day 2: GI Consult:  Abdomen soft, tenderness to palpation in RLQ, mildly distended  Crohn's disease of small intestine w/ concern for ileal anal stricture: Recommend repeat CT enterography, MRI enterography   Plan for endoscopy  Seen by Colorectal surgery: possible repeat pouch exam to determine degree of ileal pouch anal anastomosis stricturing  Prednisone not likely to improve clinical picture at this time  NPO, NGT, PRN analgesia and antiemetics, continue IVF       Triage Vitals [11/22/22 2321]   Temperature Pulse Respirations Blood Pressure SpO2   97 9 °F (36 6 °C) (!) 111 18 154/90 96 %      Temp src Heart Rate Source Patient Position - Orthostatic VS BP Location FiO2 (%)   -- -- -- -- --      Pain Score       8          Wt Readings from Last 1 Encounters:   11/22/22 54 kg (119 lb)     Additional Vital Signs:   Date/Time Temp Pulse Resp BP MAP (mmHg) SpO2 O2 Device   11/23/22 10:26:44 -- -- -- 147/86 106 -- --   11/23/22 02:37:46 97 9 °F (36 6 °C) 95 18 131/80 97 99 % --   11/22/22 23:21:16 97 9 °F (36 6 °C) 111 Abnormal  18 154/90 111 96 % None (Room air)     Pertinent Labs/Diagnostic Test Results:       Results from last 7 days   Lab Units 11/23/22  0112   WBC Thousand/uL 5 62   HEMOGLOBIN g/dL 14 2   HEMATOCRIT % 44 2   PLATELETS Thousands/uL 249   NEUTROS ABS Thousands/µL 3 90         Results from last 7 days   Lab Units 11/23/22  0112   SODIUM mmol/L 138   POTASSIUM mmol/L 3 8   CHLORIDE mmol/L 103   CO2 mmol/L 25   ANION GAP mmol/L 10   BUN mg/dL 20   CREATININE mg/dL 0 91   EGFR ml/min/1 73sq m 64   CALCIUM mg/dL 9 9   MAGNESIUM mg/dL 2 0         Results from last 7 days   Lab Units 11/23/22  0635 11/23/22  0013   POC GLUCOSE mg/dl 86 103     Results from last 7 days   Lab Units 11/23/22  0112   GLUCOSE RANDOM mg/dL 116       Past Medical History:   Diagnosis Date   • Asthma    • COPD (chronic obstructive pulmonary disease) (HCC)    • Crohn's disease ([facility] )    • Diabetes mellitus ([facility] )    • Hypertension    • Multiple myeloma ([facility] )    • Ocular hypertension      Present on Admission:  • Crohn's disease of small intestine with other complication ([facility] )      Admitting Diagnosis: Small bowel obstruction  Age/Sex: 81 y o  female  Admission Orders:  Scheduled Medications:  enoxaparin, 40 mg, Subcutaneous, Q24H CAROLYN  famotidine, 20 mg, Intravenous, Q24H [address]  insulin lispro, 1-5 Units, Subcutaneous, Q6H CAROLYN  potassium chloride, 20 mEq, Intravenous, Once      Continuous IV Infusions:  lactated ringers, 75 mL/hr, Intravenous, Continuous      PRN Meds:  HYDROmorphone, 0 5 mg, Intravenous, Q4H PRN x1 thus far  HYDROmorphone, 0 2 mg, Intravenous, Q4H PRN    scd    IP CONSULT TO GASTROENTEROLOGY    Network Utilization Review Department  ATTENTION: Please call with any questions or concerns to [phone] and carefully listen to the prompts so that you are directed to the right person  All voicemails are confidential   Helena Oreilly all requests for admission clinical reviews, approved or denied determinations and any other requests to dedicated fax number below belonging to the campus where the patient is receiving treatment   List of dedicated fax numbers for the Facilities:  [address] DENIALS (Administrative/Medical Necessity) [phone]   [address] (Maternity/NICU/Pediatrics) [phone]   8 Gabriella Carbone [phone]   [place] [address] [phone]   [address] [place] [address] Kristian Zeb Calz[address] [phone]   [address] [facility] 134 815 [facility] [phone]

## 2022-11-23 NOTE — CASE MANAGEMENT
Case Management Discharge Planning Note    Patient name Elton Razo  Location [address] [facility] [id number]  : 1950 Date 2022       Current Admission Date: 2022  Current Admission Diagnosis:Crohn's disease of small intestine with other complication [facility])   Patient Active Problem List    Diagnosis Date Noted   • Hypothyroid 2022   • Protein-calorie malnutrition, unspecified severity ([facility] ) 10/22/2021   • MGUS (monoclonal gammopathy of unknown significance) 2021   • Low iron 2021   • [facility]'s [facility] of small intestine [facility] other [facility] 7767   • Asthma    • COPD (chronic obstructive pulmonary disease) ([facility] )    • Diabetes mellitus ([facility] )    • Ocular hypertension    • Primary open angle glaucoma of both eyes, moderate stage 2020   • Crohn's disease ([facility] ) 2020   • Hypertension goal BP (blood pressure) < 140/90 10/22/2019   • Status post reversal of ileostomy 10/10/2019   • Gastro-esophageal reflux disease without esophagitis 2015      LOS (days): 1  Geometric Mean LOS (GMLOS) (days): 3 60  Days to GMLOS:3     OBJECTIVE:  Risk of Unplanned Readmission Score: 8 18         Current admission status: Inpatient   Preferred Pharmacy:   [address] [place] [address] [address] [facility] [address] [place] [address]  Phone: [phone] Fax: [facility] [place], [address]  [place] [address]  Phone: [phone] Fax: [phone]    Primary Care Provider: Lety Brown DO    Primary Insurance: [facility] REP  Secondary Insurance:     DISCHARGE DETAILS:        Additional Comments: Left Vm for daughter Carmen Minus  [phone] for open

## 2022-11-23 NOTE — UTILIZATION REVIEW
NOTIFICATION OF INPATIENT ADMISSION   AUTHORIZATION REQUEST   SERVICING FACILITY:   [facility]  Address: [address] [place] [address], [address] [place] [address]  Tax ID: [id number]  NPI: [id number] ATTENDING PROVIDER:  Attending Name and NPI#: Shawanda Clark Md [[id number]]  Address: [address] [place] [address], [address] [place], [place] [id number]  Phone: [phone]   ADMISSION INFORMATION:  [facility]  Place of Service Code: 21  Inpatient Admission Date/Time: 11/22/22 10:57 PM  Discharge Date/Time: No discharge date for patient encounter  Admitting Diagnosis Code/Description:  Small bowel obstruction     UTILIZATION REVIEW CONTACT:  Holly Santiago, Utilization   Network Utilization Review Department  Phone: [phone]  Fax: [phone]  Email: [email or web address]  Contact for approvals/pending authorizations, clinical reviews, and discharge  PHYSICIAN ADVISORY SERVICES:  Medical Necessity Denial & [id number] Review  Phone: [phone]  Fax: [phone]  Email: [email or web address]

## 2022-11-23 NOTE — PLAN OF CARE
Problem: GASTROINTESTINAL - ADULT  Goal: Minimal or absence of nausea and/or vomiting  Description: INTERVENTIONS:  - Administer IV fluids if ordered to ensure adequate hydration  - Maintain NPO status until nausea and vomiting are resolved  - Nasogastric tube if ordered  - Administer ordered antiemetic medications as needed  - Provide nonpharmacologic comfort measures as appropriate  - Advance diet as tolerated, if ordered  - Consider nutrition services referral to assist patient with adequate nutrition and appropriate food choices  Outcome: Progressing  Goal: Maintains or returns to baseline bowel function  Description: INTERVENTIONS:  - Assess bowel function  - Encourage oral fluids to ensure adequate hydration  - Administer IV fluids if ordered to ensure adequate hydration  - Administer ordered medications as needed  - Encourage mobilization and activity  - Consider nutritional services referral to assist patient with adequate nutrition and appropriate food choices  Outcome: Progressing  Goal: Maintains adequate nutritional intake  Description: INTERVENTIONS:  - Monitor percentage of each meal consumed  - Identify factors contributing to decreased intake, treat as appropriate  - Assist with meals as needed  - Monitor I&O, weight, and lab values if indicated  - Obtain nutrition services referral as needed  Outcome: Progressing     Problem: METABOLIC, FLUID AND ELECTROLYTES - ADULT  Goal: Electrolytes maintained within normal limits  Description: INTERVENTIONS:  - Monitor labs and assess patient for signs and symptoms of electrolyte imbalances  - Administer electrolyte replacement as ordered  - Monitor response to electrolyte replacements, including repeat lab results as appropriate  - Instruct patient on fluid and nutrition as appropriate  Outcome: Progressing  Goal: Fluid balance maintained  Description: INTERVENTIONS:  - Monitor labs   - Monitor I/O and WT  - Instruct patient on fluid and nutrition as appropriate  - Assess for signs & symptoms of volume excess or deficit  Outcome: Progressing  Goal: Glucose maintained within target range  Description: INTERVENTIONS:  - Monitor Blood Glucose as ordered  - Assess for signs and symptoms of hyperglycemia and hypoglycemia  - Administer ordered medications to maintain glucose within target range  - Assess nutritional intake and initiate nutrition service referral as needed  Outcome: Progressing     Problem: PAIN - ADULT  Goal: Verbalizes/displays adequate comfort level or baseline comfort level  Description: Interventions:  - Encourage patient to monitor pain and request assistance  - Assess pain using appropriate pain scale  - Administer analgesics based on type and severity of pain and evaluate response  - Implement non-pharmacological measures as appropriate and evaluate response  - Consider cultural and social influences on pain and pain management  - Notify physician/advanced practitioner if interventions unsuccessful or patient reports new pain  Outcome: Progressing

## 2022-11-24 ENCOUNTER — APPOINTMENT (INPATIENT)
Dept: RADIOLOGY | Facility: HOSPITAL | Age: 72
End: 2022-11-24

## 2022-11-24 ENCOUNTER — APPOINTMENT (OUTPATIENT)
Dept: RADIOLOGY | Facility: HOSPITAL | Age: 72
End: 2022-11-24

## 2022-11-24 LAB
ANION GAP SERPL CALCULATED.3IONS-SCNC: 5 MMOL/L (ref 4–13)
BUN SERPL-MCNC: 9 MG/DL (ref 5–25)
CALCIUM SERPL-MCNC: 8.6 MG/DL (ref 8.3–10.1)
CHLORIDE SERPL-SCNC: 105 MMOL/L (ref 96–108)
CO2 SERPL-SCNC: 29 MMOL/L (ref 21–32)
CREAT SERPL-MCNC: 0.7 MG/DL (ref 0.6–1.3)
CRP SERPL QL: 75 MG/L
ERYTHROCYTE [DISTWIDTH] IN BLOOD BY AUTOMATED COUNT: 13.6 % (ref 11.6–15.1)
GFR SERPL CREATININE-BSD FRML MDRD: 88 ML/MIN/1.73SQ M
GLUCOSE SERPL-MCNC: 116 MG/DL (ref 65–140)
GLUCOSE SERPL-MCNC: 155 MG/DL (ref 65–140)
GLUCOSE SERPL-MCNC: 166 MG/DL (ref 65–140)
GLUCOSE SERPL-MCNC: 172 MG/DL (ref 65–140)
GLUCOSE SERPL-MCNC: 176 MG/DL (ref 65–140)
HCT VFR BLD AUTO: 37.5 % (ref 34.8–46.1)
HGB BLD-MCNC: 12 G/DL (ref 11.5–15.4)
MAGNESIUM SERPL-MCNC: 1.7 MG/DL (ref 1.6–2.6)
MCH RBC QN AUTO: 29.5 PG (ref 26.8–34.3)
MCHC RBC AUTO-ENTMCNC: 32 G/DL (ref 31.4–37.4)
MCV RBC AUTO: 92 FL (ref 82–98)
PLATELET # BLD AUTO: 184 THOUSANDS/UL (ref 149–390)
PMV BLD AUTO: 11.7 FL (ref 8.9–12.7)
POTASSIUM SERPL-SCNC: 3.6 MMOL/L (ref 3.5–5.3)
RBC # BLD AUTO: 4.07 MILLION/UL (ref 3.81–5.12)
SODIUM SERPL-SCNC: 139 MMOL/L (ref 135–147)
WBC # BLD AUTO: 4.82 THOUSAND/UL (ref 4.31–10.16)

## 2022-11-24 PROCEDURE — 0D7Q7ZZ DILATION OF ANUS, VIA NATURAL OR ARTIFICIAL OPENING: ICD-10-PCS | Performed by: INTERNAL MEDICINE

## 2022-11-24 RX ORDER — MAGNESIUM SULFATE HEPTAHYDRATE 40 MG/ML
2 INJECTION, SOLUTION INTRAVENOUS ONCE
Status: COMPLETED | OUTPATIENT
Start: 2022-11-24 | End: 2022-11-24

## 2022-11-24 RX ORDER — POTASSIUM CHLORIDE 20 MEQ/1
40 TABLET, EXTENDED RELEASE ORAL ONCE
Status: COMPLETED | OUTPATIENT
Start: 2022-11-24 | End: 2022-11-24

## 2022-11-24 RX ADMIN — INSULIN LISPRO 1 UNITS: 100 INJECTION, SOLUTION INTRAVENOUS; SUBCUTANEOUS at 06:17

## 2022-11-24 RX ADMIN — IOHEXOL 98 ML: 350 INJECTION, SOLUTION INTRAVENOUS at 17:03

## 2022-11-24 RX ADMIN — MAGNESIUM SULFATE HEPTAHYDRATE 2 G: 40 INJECTION, SOLUTION INTRAVENOUS at 10:54

## 2022-11-24 RX ADMIN — DORZOLAMIDE HYDROCHLORIDE AND TIMOLOL MALEATE 1 DROP: 20; 5 SOLUTION/ DROPS OPHTHALMIC at 08:40

## 2022-11-24 RX ADMIN — HYDROMORPHONE HYDROCHLORIDE 0.2 MG: 0.2 INJECTION, SOLUTION INTRAMUSCULAR; INTRAVENOUS; SUBCUTANEOUS at 09:04

## 2022-11-24 RX ADMIN — HYDROMORPHONE HYDROCHLORIDE 0.5 MG: 1 INJECTION, SOLUTION INTRAMUSCULAR; INTRAVENOUS; SUBCUTANEOUS at 19:51

## 2022-11-24 RX ADMIN — INSULIN LISPRO 1 UNITS: 100 INJECTION, SOLUTION INTRAVENOUS; SUBCUTANEOUS at 12:11

## 2022-11-24 RX ADMIN — ENOXAPARIN SODIUM 40 MG: 40 INJECTION SUBCUTANEOUS at 08:40

## 2022-11-24 RX ADMIN — FLUTICASONE PROPIONATE 1 SPRAY: 50 SPRAY, METERED NASAL at 08:40

## 2022-11-24 RX ADMIN — FAMOTIDINE 20 MG: 10 INJECTION, SOLUTION INTRAVENOUS at 11:00

## 2022-11-24 RX ADMIN — POTASSIUM CHLORIDE 40 MEQ: 1500 TABLET, EXTENDED RELEASE ORAL at 10:54

## 2022-11-24 NOTE — PROGRESS NOTES
Progress Note -  [facility]  Misael Howell 81 y o  female MRN: [id number]  Unit/Bed#: [facility] 834-01 Encounter: [id number]      ASSESSMENT AND PLAN:      72-year-old female with extensive past medical history of smoldering multiple myeloma, ileocolonic Crohn's diagnosed in her 35s status post proctocolectomy with ileoanal J pouch in 0705 complicated by small-bowel obstruction, pouch stenosis status post dilation 2015, perforation status post ex lap in 2019 admitted for right lower quadrant pain  GI is consulted for further management  1  Abdominal distension, pain  Likely secondary to stenosis  Does not appear completely obstructed as she is passing flatus and having small quantity bowel movements  · Plan for CT enterography today  · Continue NPO status  · NG tube per surgery though given low output recommend discontinuing  · Pain control per primary team   · P r n  Zofran for nausea  · Will hold off on endoscopic management for now  2  Crohn's disease  With complicated surgical history  Previously on Entyvio and recently switched to [place], last dose was in October  Patient reports no improvement in symptoms  Unclear if currently an active flare  Last fecal calprotectin May was 300s  Albumin currently 4 4  CDAI 155, indicating active Crohns  • Hold steroid therapy for now as based on clinical picture unclear if she would benefit  • Follow-up CT enterography  • Check CRP, fecal calprotectin  • Continue DVT prophylaxis  • NPO status due to abdominal distension, concern for obstruction  • Will need to continue still are as an outpatient  • Will need outpatient follow-up for Crohn's management including flexible sigmoidoscopy for restaging  3  COVID positive  Found incidentally on admission  Patient denies any chest pain or shortness of breath    · Management per primary team     Rest of care per primary team     ______________________________________________________________________    Subjective:  Seen examined  Denies any abdominal pain but does admit to continued abdominal distension  Denies any nausea or vomiting  Having bowel movements, last 1 this morning, small quantity and brown  Is passing flatus  REVIEW OF SYSTEMS:    Review of Systems   Constitutional: Negative for chills and fever  HENT: Negative for congestion and sinus pressure  Respiratory: Negative for cough and shortness of breath  Cardiovascular: Negative for chest pain, palpitations and leg swelling  Gastrointestinal: Positive for abdominal distention  Negative for abdominal pain, diarrhea, nausea and vomiting  Genitourinary: Negative for dysuria and hematuria  Musculoskeletal: Negative for arthralgias and back pain  Skin: Negative for color change and rash  Neurological: Negative for dizziness and headaches  Psychiatric/Behavioral: Negative for agitation and confusion  All other systems reviewed and are negative           Historical Information   Past Medical History:   Diagnosis Date   • Asthma    • COPD (chronic obstructive pulmonary disease) ([facility] )    • Crohn's disease ([facility] )    • Diabetes mellitus ([facility] )    • Hypertension    • Multiple myeloma ([facility] )    • Ocular hypertension      Past Surgical History:   Procedure Laterality Date   • CATARACT EXTRACTION     • COLON SURGERY      Partial colectomy, partial removal of the rectum   • EXPLORATORY LAPAROTOMY     • ILEOSTOMY     • IR BIOPSY BONE MARROW  10/13/2021     Social History   Social History     Substance and Sexual Activity   Alcohol Use No     Social History     Substance and Sexual Activity   Drug Use No     Social History     Tobacco Use   Smoking Status Never   Smokeless Tobacco Never     Family History   Problem Relation Age of Onset   • Diabetes Mother    • Hypertension Mother        Meds/Allergies     Medications Prior to Admission   Medication   • Ascorbic Acid (Vitamin C) 500 MG CAPS   • bimatoprost (LUMIGAN) 0 01 % ophthalmic drops   • dorzolamide-timolol (COSOPT) 22 3-6 8 MG/ML ophthalmic solution   • famotidine (PEPCID) 40 MG tablet   • ferrous sulfate 325 (65 FE) MG EC tablet   • fluticasone (FLONASE) 50 mcg/act nasal spray   • ipratropium (ATROVENT) 0 03 % nasal spray   • levothyroxine 25 mcg tablet   • loperamide (IMODIUM) 2 mg capsule   • Multiple Vitamins-Minerals (OCUTABS-LUTEIN) TABS   • omeprazole (PriLOSEC) 20 mg delayed release capsule   • ondansetron (ZOFRAN) 4 mg tablet   • potassium chloride (K-DUR,KLOR-CON) 20 mEq tablet   • simethicone (MYLICON) 80 mg chewable tablet   • albuterol (PROVENTIL HFA,VENTOLIN HFA) 90 mcg/act inhaler   • atorvastatin (LIPITOR) 10 mg tablet   • Cyanocobalamin (B-12) 5000 MCG CAPS   • Entyvio SOLR   • Folic Acid 20 MG CAPS   • mometasone (NASONEX) 50 mcg/act nasal spray   • mometasone-formoterol (DULERA) 200-5 MCG/ACT inhaler   • Nebulizers (Nebulizer Compressor) MISC   • timolol (TIMOPTIC) 0 25 % ophthalmic solution   • ustekinumab (Stelara) 90 mg/mL subcutaneous injection   • vedolizumab (Entyvio) SOLR   • Zinc 50 MG CAPS     Current Facility-Administered Medications   Medication Dose Route Frequency   • dextrose 5 % and sodium chloride 0 45 % with KCl 20 mEq/L infusion  84 mL/hr Intravenous Continuous   • dorzolamide-timolol (COSOPT) 22 3-6 8 MG/ML ophthalmic solution 1 drop  1 drop Both Eyes Daily   • enoxaparin (LOVENOX) subcutaneous injection 40 mg  40 mg Subcutaneous Q24H CAROLYN   • Famotidine (PF) (PEPCID) injection 20 mg  20 mg Intravenous Q24H CAROLYN   • fluticasone (FLONASE) 50 mcg/act nasal spray 1 spray  1 spray Each Nare Daily   • HYDROmorphone (DILAUDID) injection 0 5 mg  0 5 mg Intravenous Q4H PRN   • HYDROmorphone HCl (DILAUDID) injection 0 2 mg  0 2 mg Intravenous Q4H PRN   • insulin lispro (HumaLOG) 100 units/mL subcutaneous injection 1-5 Units  1-5 Units Subcutaneous Q6H [facility]       Allergies   Allergen Reactions   • Brimonidine      Red eyes   • Tramadol Nausea Only           Objective     Blood pressure 141/71, pulse 87, temperature 99 7 °F (37 6 °C), resp  rate 20, height 5' 2" (1 575 m), weight 54 kg (119 lb), SpO2 99 %  Body mass index is 21 77 kg/m²  Intake/Output Summary (Last 24 hours) at 11/24/2022 0706  Last data filed at 11/23/2022 2340  Gross per 24 hour   Intake 1893 35 ml   Output 611 ml   Net 1282 35 ml         PHYSICAL EXAM:      Physical Exam  Vitals and nursing note reviewed  Constitutional:       General: She is not in acute distress  Appearance: Normal appearance  She is well-developed  She is not ill-appearing  Comments: NG tube in place  HENT:      Head: Normocephalic and atraumatic  Mouth/Throat:      Mouth: Mucous membranes are moist    Eyes:      Extraocular Movements: Extraocular movements intact  Conjunctiva/sclera: Conjunctivae normal    Cardiovascular:      Rate and Rhythm: Normal rate and regular rhythm  Pulses: Normal pulses  Heart sounds: Normal heart sounds  No murmur heard  No friction rub  No gallop  Pulmonary:      Effort: Pulmonary effort is normal  No respiratory distress  Breath sounds: Normal breath sounds  No wheezing or rales  Abdominal:      General: Abdomen is flat  Bowel sounds are normal  There is distension  Palpations: Abdomen is soft  Tenderness: There is no abdominal tenderness  There is no guarding  Musculoskeletal:      Cervical back: Neck supple  Right lower leg: No edema  Left lower leg: No edema  Skin:     General: Skin is warm and dry  Neurological:      General: No focal deficit present  Mental Status: She is alert and oriented to person, place, and time     Psychiatric:         Mood and Affect: Mood normal          Behavior: Behavior normal           Lab Results:   Admission on 11/22/2022   Component Date Value   • WBC 11/23/2022 5 62    • RBC 11/23/2022 4 87    • Hemoglobin 11/23/2022 14 2    • Hematocrit 11/23/2022 44 2    • MCV 11/23/2022 91    • MCH 11/23/2022 29 2    • MCHC 11/23/2022 32 1    • RDW 11/23/2022 13 4    • MPV 11/23/2022 11 1    • Platelets 41/59/6852 249    • nRBC 11/23/2022 0    • Neutrophils Relative 11/23/2022 69    • Immat GRANS % 11/23/2022 1    • Lymphocytes Relative 11/23/2022 11 (L)    • Monocytes Relative 11/23/2022 16 (H)    • Eosinophils Relative 11/23/2022 3    • Basophils Relative 11/23/2022 0    • Neutrophils Absolute 11/23/2022 3 90    • Immature Grans Absolute 11/23/2022 0 06    • Lymphocytes Absolute 11/23/2022 0 63    • Monocytes Absolute 11/23/2022 0 88    • Eosinophils Absolute 11/23/2022 0 14    • Basophils Absolute 11/23/2022 0 01    • Sodium 11/23/2022 138    • Potassium 11/23/2022 3 8    • Chloride 11/23/2022 103    • CO2 11/23/2022 25    • ANION GAP 11/23/2022 10    • BUN 11/23/2022 20    • Creatinine 11/23/2022 0 91    • Glucose 11/23/2022 116    • Calcium 11/23/2022 9 9    • eGFR 11/23/2022 64    • Magnesium 11/23/2022 2 0    • POC Glucose 11/23/2022 103    • POC Glucose 11/23/2022 86    • POC Glucose 11/23/2022 80    • POC Glucose 11/23/2022 69    • POC Glucose 11/23/2022 141 (H)    • POC Glucose 11/23/2022 130    • POC Glucose 11/23/2022 155 (H)    • Sodium 11/24/2022 139    • Potassium 11/24/2022 3 6    • Chloride 11/24/2022 105    • CO2 11/24/2022 29    • ANION GAP 11/24/2022 5    • BUN 11/24/2022 9    • Creatinine 11/24/2022 0 70    • Glucose 11/24/2022 172 (H)    • Calcium 11/24/2022 8 6    • eGFR 11/24/2022 88    • WBC 11/24/2022 4 82    • RBC 11/24/2022 4 07    • Hemoglobin 11/24/2022 12 0    • Hematocrit 11/24/2022 37 5    • MCV 11/24/2022 92    • MCH 11/24/2022 29 5    • MCHC 11/24/2022 32 0    • RDW 11/24/2022 13 6    • Platelets 23/33/3564 184    • MPV 11/24/2022 11 7    • Magnesium 11/24/2022 1 7    • POC Glucose 11/24/2022 166 (H)        Imaging Studies: I have personally reviewed pertinent imaging studies      Rhonda Castillo SANDRA O   Gastroenterology Fellow  PGY-4  Available via [employer]  11/24/2022 7:06 AM

## 2022-11-24 NOTE — PROGRESS NOTES
Progress Note - Colorectal Surgery  Azael Cardenas 81 y o  female MRN: [id number]  Unit/Bed#: [facility] 834-01 Encounter: [id number]      Assessment:  81 y o  female with crohn's disease and a complex past surgical history presents with nausea, vomiting, abdominal pain with concern for ileal anal stricture  COVID positive  NGT: 200   + 1x      Plan:  -NPO/NGT  - mIVF @ 84  - PRN analgesics and antiemetics  - CT enterography per GI  - pepcid  - SSI  - DVT ppx        Subjective: No acute events overnight  Afebrile, hemodynamically stable  Passing flatus, having BM  No nausea, or vomiting, fevers or chills  Objective:   Temp:  [98 4 °F (36 9 °C)-99 7 °F (37 6 °C)] 99 7 °F (37 6 °C)  HR:  [] 87  Resp:  [20] 20  BP: (125-147)/(71-86) 141/71    Physical Exam:  General: No acute distress, alert and oriented  CV: Well perfused, regular rate and rhythm  Lungs: Normal work of breathing, no increased respiratory effort  Abdomen: Soft, non-tender, distended  Extremities: No edema, clubbing or cyanosis  Skin: Warm, dry      I/O       11/22 0701  11/23 0700 11/23 0701  11/24 0700 11/24 0701  11/25 0700    I V  (mL/kg)  1893 4 (35 1)     Total Intake(mL/kg)  1893 4 (35 1)     Urine (mL/kg/hr)  410 (0 3)     Emesis/NG output 150 200     Stool  1     Total Output 150 611     Net -150 +1282 4            Unmeasured Urine Occurrence  1 x           Lab, Imaging and other studies: I have personally reviewed pertinent reports    , CBC with diff:   Lab Results   Component Value Date    WBC 4 82 11/24/2022    HGB 12 0 11/24/2022    HCT 37 5 11/24/2022    MCV 92 11/24/2022     11/24/2022    MCH 29 5 11/24/2022    MCHC 32 0 11/24/2022    RDW 13 6 11/24/2022    MPV 11 7 11/24/2022   , BMP/CMP:   Lab Results   Component Value Date    SODIUM 139 11/24/2022    K 3 6 11/24/2022     11/24/2022    CO2 29 11/24/2022    BUN 9 11/24/2022    CREATININE 0 70 11/24/2022    CALCIUM 8 6 11/24/2022    EGFR 88 11/24/2022 Kenneth Levin MD  11/24/2022 8:46 AM

## 2022-11-25 LAB
ANION GAP SERPL CALCULATED.3IONS-SCNC: 6 MMOL/L (ref 4–13)
BASOPHILS # BLD AUTO: 0.02 THOUSANDS/ÂΜL (ref 0–0.1)
BASOPHILS NFR BLD AUTO: 0 % (ref 0–1)
BUN SERPL-MCNC: 10 MG/DL (ref 5–25)
CALCIUM SERPL-MCNC: 9.3 MG/DL (ref 8.3–10.1)
CHLORIDE SERPL-SCNC: 100 MMOL/L (ref 96–108)
CO2 SERPL-SCNC: 27 MMOL/L (ref 21–32)
CREAT SERPL-MCNC: 0.87 MG/DL (ref 0.6–1.3)
EOSINOPHIL # BLD AUTO: 0 THOUSAND/ÂΜL (ref 0–0.61)
EOSINOPHIL NFR BLD AUTO: 0 % (ref 0–6)
ERYTHROCYTE [DISTWIDTH] IN BLOOD BY AUTOMATED COUNT: 13.5 % (ref 11.6–15.1)
GFR SERPL CREATININE-BSD FRML MDRD: 67 ML/MIN/1.73SQ M
GLUCOSE SERPL-MCNC: 134 MG/DL (ref 65–140)
GLUCOSE SERPL-MCNC: 136 MG/DL (ref 65–140)
GLUCOSE SERPL-MCNC: 137 MG/DL (ref 65–140)
GLUCOSE SERPL-MCNC: 148 MG/DL (ref 65–140)
GLUCOSE SERPL-MCNC: 168 MG/DL (ref 65–140)
GLUCOSE SERPL-MCNC: 203 MG/DL (ref 65–140)
HCT VFR BLD AUTO: 44.5 % (ref 34.8–46.1)
HGB BLD-MCNC: 14 G/DL (ref 11.5–15.4)
IMM GRANULOCYTES # BLD AUTO: 0.04 THOUSAND/UL (ref 0–0.2)
IMM GRANULOCYTES NFR BLD AUTO: 1 % (ref 0–2)
LYMPHOCYTES # BLD AUTO: 2.24 THOUSANDS/ÂΜL (ref 0.6–4.47)
LYMPHOCYTES NFR BLD AUTO: 34 % (ref 14–44)
MCH RBC QN AUTO: 29.1 PG (ref 26.8–34.3)
MCHC RBC AUTO-ENTMCNC: 31.5 G/DL (ref 31.4–37.4)
MCV RBC AUTO: 93 FL (ref 82–98)
MONOCYTES # BLD AUTO: 0.82 THOUSAND/ÂΜL (ref 0.17–1.22)
MONOCYTES NFR BLD AUTO: 12 % (ref 4–12)
NEUTROPHILS # BLD AUTO: 3.57 THOUSANDS/ÂΜL (ref 1.85–7.62)
NEUTS SEG NFR BLD AUTO: 53 % (ref 43–75)
NRBC BLD AUTO-RTO: 0 /100 WBCS
PLATELET # BLD AUTO: 206 THOUSANDS/UL (ref 149–390)
PMV BLD AUTO: 11.9 FL (ref 8.9–12.7)
POTASSIUM SERPL-SCNC: 4 MMOL/L (ref 3.5–5.3)
RBC # BLD AUTO: 4.81 MILLION/UL (ref 3.81–5.12)
SODIUM SERPL-SCNC: 133 MMOL/L (ref 135–147)
WBC # BLD AUTO: 6.69 THOUSAND/UL (ref 4.31–10.16)

## 2022-11-25 RX ADMIN — DORZOLAMIDE HYDROCHLORIDE AND TIMOLOL MALEATE 1 DROP: 20; 5 SOLUTION/ DROPS OPHTHALMIC at 09:18

## 2022-11-25 RX ADMIN — HYDROMORPHONE HYDROCHLORIDE 0.5 MG: 1 INJECTION, SOLUTION INTRAMUSCULAR; INTRAVENOUS; SUBCUTANEOUS at 11:49

## 2022-11-25 RX ADMIN — ENOXAPARIN SODIUM 40 MG: 40 INJECTION SUBCUTANEOUS at 09:17

## 2022-11-25 RX ADMIN — FLUTICASONE PROPIONATE 1 SPRAY: 50 SPRAY, METERED NASAL at 09:18

## 2022-11-25 RX ADMIN — PHENOL 1 SPRAY: 1.5 LIQUID ORAL at 06:04

## 2022-11-25 RX ADMIN — INSULIN LISPRO 1 UNITS: 100 INJECTION, SOLUTION INTRAVENOUS; SUBCUTANEOUS at 11:39

## 2022-11-25 RX ADMIN — ALUMINUM HYDROXIDE, MAGNESIUM HYDROXIDE, AND DIMETHICONE 10 ML: 200; 20; 200 SUSPENSION ORAL at 21:00

## 2022-11-25 RX ADMIN — HYDROMORPHONE HYDROCHLORIDE 0.5 MG: 1 INJECTION, SOLUTION INTRAMUSCULAR; INTRAVENOUS; SUBCUTANEOUS at 06:44

## 2022-11-25 RX ADMIN — PHENOL 1 SPRAY: 1.5 LIQUID ORAL at 09:19

## 2022-11-25 RX ADMIN — INSULIN LISPRO 1 UNITS: 100 INJECTION, SOLUTION INTRAVENOUS; SUBCUTANEOUS at 17:56

## 2022-11-25 RX ADMIN — HYDROMORPHONE HYDROCHLORIDE 0.5 MG: 1 INJECTION, SOLUTION INTRAMUSCULAR; INTRAVENOUS; SUBCUTANEOUS at 17:58

## 2022-11-25 RX ADMIN — FAMOTIDINE 20 MG: 10 INJECTION, SOLUTION INTRAVENOUS at 09:17

## 2022-11-25 NOTE — PROGRESS NOTES
Progress Note - Colorectal Surgery   Renee Kvng 81 y o  female MRN: [id number]  Unit/Bed#: [facility] 834-01 Encounter: [id number]    Assessment:  81 y o  female with crohn's disease and a complex past surgical history presents with nausea, vomiting, abdominal pain with concern for ileal anal stricture  COVID positive       NGT replaced, 0 recorded    AVSS on RA    Plan:  - NPO/NGT  - mIVF  - PRN analgesics and antiemetics  - pepcid  - SSI  - DVT ppx      Subjective/Objective     Subjective:   No acute events overnight  -N  -V  +F  Had BM overnight  Loose  Objective:     Blood pressure 136/73, pulse 83, temperature 97 7 °F (36 5 °C), resp  rate 18, height 5' 2" (1 575 m), weight 54 kg (119 lb), SpO2 97 %  ,Body mass index is 21 77 kg/m²  Intake/Output Summary (Last 24 hours) at 11/25/2022 0655  Last data filed at 11/25/2022 0501  Gross per 24 hour   Intake 2549 8 ml   Output --   Net 2549 8 ml       Invasive Devices     Peripheral Intravenous Line  Duration           Peripheral IV 11/24/22 Right;Upper;Ventral (anterior); Medial Arm <1 day          Drain  Duration           NG/OG/Enteral Tube Nasogastric Left nare <1 day                Physical Exam:   Gen: NAD, Comfortable  Neuro: A&O, No focal deficits  Head: Normal Cephalic, Atraumatic  Eye: EOMI, PERRLA, No scleral icterus  Neck: Supple, No JVD, Midline trachea  CV: RRR, Cap refill <2 sec  Pulm: Normal work of breathing, no respiratory distress  Abd: Soft, Distended, Non-Tender  Ext: No edema, Non-tender  Skin: warm, dry, intact

## 2022-11-25 NOTE — PLAN OF CARE
Problem: GASTROINTESTINAL - ADULT  Goal: Minimal or absence of nausea and/or vomiting  Description: INTERVENTIONS:  - Administer IV fluids if ordered to ensure adequate hydration  - Maintain NPO status until nausea and vomiting are resolved  - Nasogastric tube if ordered  - Administer ordered antiemetic medications as needed  - Provide nonpharmacologic comfort measures as appropriate  - Advance diet as tolerated, if ordered  - Consider nutrition services referral to assist patient with adequate nutrition and appropriate food choices  11/25/2022 0012 by Bhavna Reeves RN  Outcome: Progressing  11/24/2022 2004 by Bhavna Reeves RN  Outcome: Progressing  Goal: Maintains or returns to baseline bowel function  Description: INTERVENTIONS:  - Assess bowel function  - Encourage oral fluids to ensure adequate hydration  - Administer IV fluids if ordered to ensure adequate hydration  - Administer ordered medications as needed  - Encourage mobilization and activity  - Consider nutritional services referral to assist patient with adequate nutrition and appropriate food choices  11/25/2022 0012 by Bhavna Reeves RN  Outcome: Progressing  11/24/2022 2004 by Bhavna Reeves RN  Outcome: Progressing  Goal: Maintains adequate nutritional intake  Description: INTERVENTIONS:  - Monitor percentage of each meal consumed  - Identify factors contributing to decreased intake, treat as appropriate  - Assist with meals as needed  - Monitor I&O, weight, and lab values if indicated  - Obtain nutrition services referral as needed  11/25/2022 0012 by Bhavna Reeves RN  Outcome: Progressing  11/24/2022 2004 by Bhavna Reeves RN  Outcome: Progressing     Problem: METABOLIC, FLUID AND ELECTROLYTES - ADULT  Goal: Electrolytes maintained within normal limits  Description: INTERVENTIONS:  - Monitor labs and assess patient for signs and symptoms of electrolyte imbalances  - Administer electrolyte replacement as ordered  - Monitor response to electrolyte replacements, including repeat lab results as appropriate  - Instruct patient on fluid and nutrition as appropriate  11/25/2022 0012 by Tiffany Siegel RN  Outcome: Progressing  11/24/2022 2004 by Tiffany Siegel RN  Outcome: Progressing  Goal: Fluid balance maintained  Description: INTERVENTIONS:  - Monitor labs   - Monitor I/O and WT  - Instruct patient on fluid and nutrition as appropriate  - Assess for signs & symptoms of volume excess or deficit  11/25/2022 0012 by Tiffany Siegel RN  Outcome: Progressing  11/24/2022 2004 by Tiffany Siegel RN  Outcome: Progressing  Goal: Glucose maintained within target range  Description: INTERVENTIONS:  - Monitor Blood Glucose as ordered  - Assess for signs and symptoms of hyperglycemia and hypoglycemia  - Administer ordered medications to maintain glucose within target range  - Assess nutritional intake and initiate nutrition service referral as needed  11/25/2022 0012 by Tiffany Siegel RN  Outcome: Progressing  11/24/2022 2004 by Tiffany Siegel RN  Outcome: Progressing     Problem: PAIN - ADULT  Goal: Verbalizes/displays adequate comfort level or baseline comfort level  Description: Interventions:  - Encourage patient to monitor pain and request assistance  - Assess pain using appropriate pain scale  - Administer analgesics based on type and severity of pain and evaluate response  - Implement non-pharmacological measures as appropriate and evaluate response  - Consider cultural and social influences on pain and pain management  - Notify physician/advanced practitioner if interventions unsuccessful or patient reports new pain  11/25/2022 0012 by Tiffany Siegel RN  Outcome: Progressing  11/24/2022 2004 by Tiffany Siegel RN  Outcome: Progressing

## 2022-11-25 NOTE — CASE MANAGEMENT
Case Management Assessment & Discharge Planning Note    Patient name Robby Garay  Location [address] 834/PPHP 188-87 MRN [id number]  : 1950 Date 2022       Current Admission Date: 2022  Current Admission Diagnosis:Crohn's disease of small intestine with other complication [facility])   Patient Active Problem List    Diagnosis Date Noted   • Hypothyroid 2022   • Protein-calorie malnutrition, unspecified severity ([facility] ) 10/22/2021   • MGUS (monoclonal gammopathy of unknown significance) 2021   • Low iron 2021   • Crohn's disease of small intestine with other complication ([facility] )    • Asthma    • COPD (chronic obstructive pulmonary disease) ([facility] )    • Diabetes mellitus ([facility] )    • Ocular hypertension    • Primary open angle glaucoma of both eyes, moderate stage 2020   • Crohn's disease ([facility] ) 2020   • Hypertension goal BP (blood pressure) < 140/90 10/22/2019   • Status post reversal of ileostomy 10/10/2019   • Gastro-esophageal reflux disease without esophagitis 2015      LOS (days): 3  Geometric Mean LOS (GMLOS) (days): 3 60  Days to GMLOS:1 1     OBJECTIVE:    Risk of Unplanned Readmission Score: 8 43         Current admission status: Inpatient       Preferred Pharmacy:   [address] [place] [address] [address] [facility] [address] [place] [address]  Phone: [phone] Fax: [facility] [address] [address]  Phone: [phone] Fax: [phone]    Primary Care Provider: Batsheva Petty DO    Primary Insurance: Edvin Corona W Francis MONTESINOS  Secondary Insurance:     ASSESSMENT:  [address] 26 Proxies    There are no active Health Care Proxies on file         Advance Directives  Does patient have a [address] [place] [address] Avenue?: No  Does patient have Advance Directives?: No              Patient Information  Admitted from[de-identified] Home  Mental Status: Alert  Assessment information provided by[de-identified] Daughter  Primary Caregiver: Self  Support Systems: [address] of Residence: [facility] entry access options   Select all that apply : Elevator  Type of Current Residence: Apartment  Floor Level: 1  Upon entering residence, is there a bedroom on the main floor (no further steps)?: Yes  Upon entering residence, is there a bathroom on the main floor (no further steps)?: Yes  In the last 12 months, was there a time when you were not able to pay the mortgage or rent on time?: No  In the last 12 months, how many places have you lived?: 1  In the last 12 months, was there a time when you did not have a steady place to sleep or slept in a shelter (including now)?: No  Living Arrangements: Lives Alone    Activities of Daily Living Prior to Admission  Functional Status: Independent  Completes ADLs independently?: Yes  Ambulates independently?: Yes  Does patient use assisted devices?: No  Does patient currently own DME?: No  Does patient have a history of Outpatient Therapy (PT/OT)?: No  Does the patient have a history of Short-Term Rehab?: No  Does patient have a history of HHC?: Yes (unknown name)         Patient Information Continued  Income Source: Pension/FCI  Does patient have prescription coverage?: Yes  Within the past 12 months, you worried that your food would run out before you got the money to buy more : Never true  Within the past 12 months, the food you bought just didn't last and you didn't have money to get more : Never true  Does patient receive dialysis treatments?: No  Does patient have a history of substance abuse?: No  Does patient have a history of Mental Health Diagnosis?: No         Means of Transportation  Means of Transport to Appts[de-identified] Family transport  In the past 12 months, has lack of transportation kept you from medical appointments or from getting medications?: No  In the past 12 months, has lack of transportation kept you from meetings, work, or from getting things needed for daily living?: No        DISCHARGE DETAILS:    Discharge planning discussed with[de-identified] nora  Freedom of Choice: Yes                   Contacts  Patient Contacts: nora Everett Massluis manuel  Relationship to Patient[de-identified] Family  Contact Method: Phone  Phone Number: [phone]  Reason/Outcome: Continuity of Care, Emergency Contact, Discharge Planning                   Would you like to participate in our [address] service program?  : No - Declined     CM reviewed d/c planning process including the following: identifying help at home, patient preference for d/c planning needs, Discharge Lounge, [facility] Meds to Bed program, availability of treatment team to discuss questions or concerns patient and/or family may have regarding understanding medications and recognizing signs and symptoms once discharged  CM also encouraged patient to follow up with all recommended appointments after discharge  Patient advised of importance for patient and family to participate in managing patient’s medical well being  Patient/caregiver received discharge checklist   Content reviewed  Patient/caregiver encouraged to participate in discharge plan of care prior to discharge home

## 2022-11-25 NOTE — PROGRESS NOTES
Progress Note - Colorectal Surgery   Eddie Niagara 81 y o  female MRN: [id number]  Unit/Bed#: [facility] 834-01 Encounter: [id number]    Assessment:  81 y o  female with crohn's disease and a complex past surgical history presents with nausea, vomiting, abdominal pain with concern for ileal anal stricture  COVID positive       AVSS on RA  Urine x 2   from 300  Stool x 2    PE distended, soft, non tender    Plan:  - NPO/NGT  - follow KUB  - mIVF  - PRN analgesics and antiemetics  - pepcid  - SSI  - DVT ppx        Subjective/Objective     Subjective:   No acute events overnight  -N  -V  +F  +BM  Objective:     Blood pressure 126/70, pulse 90, temperature 98 4 °F (36 9 °C), resp  rate 18, height 5' 2" (1 575 m), weight 54 kg (119 lb), SpO2 98 %  ,Body mass index is 21 77 kg/m²        Intake/Output Summary (Last 24 hours) at 11/25/2022 1722  Last data filed at 11/25/2022 1512  Gross per 24 hour   Intake 2237 2 ml   Output 500 ml   Net 1737 2 ml       Invasive Devices     Peripheral Intravenous Line  Duration           Peripheral IV 11/25/22 Left;Ventral (anterior) Forearm <1 day          Drain  Duration           NG/OG/Enteral Tube Nasogastric Left nare 1 day                Physical Exam:   Gen: NAD, Comfortable  Neuro: A&O, No focal deficits  Head: Normal Cephalic, Atraumatic  Eye: EOMI, PERRLA, No scleral icterus  Neck: Supple, No JVD, Midline trachea  CV: RRR, Cap refill <2 sec  Pulm: Normal work of breathing, no respiratory distress  Abd: Soft, Distended, Non-Tender  Ext: No edema, Non-tender  Skin: warm, dry, intact

## 2022-11-26 ENCOUNTER — APPOINTMENT (OUTPATIENT)
Dept: RADIOLOGY | Facility: HOSPITAL | Age: 72
End: 2022-11-26

## 2022-11-26 LAB
GLUCOSE SERPL-MCNC: 123 MG/DL (ref 65–140)
GLUCOSE SERPL-MCNC: 141 MG/DL (ref 65–140)
GLUCOSE SERPL-MCNC: 145 MG/DL (ref 65–140)
GLUCOSE SERPL-MCNC: 163 MG/DL (ref 65–140)
MAGNESIUM SERPL-MCNC: 1.8 MG/DL (ref 1.6–2.6)
PHOSPHATE SERPL-MCNC: 2.5 MG/DL (ref 2.3–4.1)

## 2022-11-26 RX ORDER — INSULIN LISPRO 100 [IU]/ML
1-5 INJECTION, SOLUTION INTRAVENOUS; SUBCUTANEOUS
Status: DISCONTINUED | OUTPATIENT
Start: 2022-11-26 | End: 2022-11-28 | Stop reason: HOSPADM

## 2022-11-26 RX ADMIN — DEXTROSE, SODIUM CHLORIDE, AND POTASSIUM CHLORIDE 84 ML/HR: 5; .45; .15 INJECTION INTRAVENOUS at 02:52

## 2022-11-26 RX ADMIN — FLUTICASONE PROPIONATE 1 SPRAY: 50 SPRAY, METERED NASAL at 08:10

## 2022-11-26 RX ADMIN — ENOXAPARIN SODIUM 40 MG: 40 INJECTION SUBCUTANEOUS at 08:10

## 2022-11-26 RX ADMIN — HYDROMORPHONE HYDROCHLORIDE 0.5 MG: 1 INJECTION, SOLUTION INTRAMUSCULAR; INTRAVENOUS; SUBCUTANEOUS at 05:12

## 2022-11-26 RX ADMIN — FAMOTIDINE 20 MG: 10 INJECTION, SOLUTION INTRAVENOUS at 08:10

## 2022-11-26 RX ADMIN — HYDROMORPHONE HYDROCHLORIDE 0.5 MG: 1 INJECTION, SOLUTION INTRAMUSCULAR; INTRAVENOUS; SUBCUTANEOUS at 20:28

## 2022-11-26 RX ADMIN — DORZOLAMIDE HYDROCHLORIDE AND TIMOLOL MALEATE 1 DROP: 20; 5 SOLUTION/ DROPS OPHTHALMIC at 08:10

## 2022-11-26 NOTE — NUTRITION
11/26/22 1102   Recommendations/Interventions   Interventions/Recommendations Diet to advance;Obtain current weight   Intervention Comments will plan to add nutrition supplements as able   Recommendations to Provider Advance diet as able  Pt now with little nutritional intake x7 days  If diet not anticipated to be advanced in the next 2-3 days, would recommend initiation of parenteral nutrition support  Please obtain new weight as able to assess for weight loss

## 2022-11-26 NOTE — PLAN OF CARE
Problem: GASTROINTESTINAL - ADULT  Goal: Minimal or absence of nausea and/or vomiting  Description: INTERVENTIONS:  - Administer IV fluids if ordered to ensure adequate hydration  - Maintain NPO status until nausea and vomiting are resolved  - Nasogastric tube if ordered  - Administer ordered antiemetic medications as needed  - Provide nonpharmacologic comfort measures as appropriate  - Advance diet as tolerated, if ordered  - Consider nutrition services referral to assist patient with adequate nutrition and appropriate food choices  Outcome: Progressing  Goal: Maintains or returns to baseline bowel function  Description: INTERVENTIONS:  - Assess bowel function  - Encourage oral fluids to ensure adequate hydration  - Administer IV fluids if ordered to ensure adequate hydration  - Administer ordered medications as needed  - Encourage mobilization and activity  - Consider nutritional services referral to assist patient with adequate nutrition and appropriate food choices  Outcome: Progressing  Goal: Maintains adequate nutritional intake  Description: INTERVENTIONS:  - Monitor percentage of each meal consumed  - Identify factors contributing to decreased intake, treat as appropriate  - Assist with meals as needed  - Monitor I&O, weight, and lab values if indicated  - Obtain nutrition services referral as needed  Outcome: Progressing     Problem: METABOLIC, FLUID AND ELECTROLYTES - ADULT  Goal: Electrolytes maintained within normal limits  Description: INTERVENTIONS:  - Monitor labs and assess patient for signs and symptoms of electrolyte imbalances  - Administer electrolyte replacement as ordered  - Monitor response to electrolyte replacements, including repeat lab results as appropriate  - Instruct patient on fluid and nutrition as appropriate  Outcome: Progressing  Goal: Fluid balance maintained  Description: INTERVENTIONS:  - Monitor labs   - Monitor I/O and WT  - Instruct patient on fluid and nutrition as appropriate  - Assess for signs & symptoms of volume excess or deficit  Outcome: Progressing  Goal: Glucose maintained within target range  Description: INTERVENTIONS:  - Monitor Blood Glucose as ordered  - Assess for signs and symptoms of hyperglycemia and hypoglycemia  - Administer ordered medications to maintain glucose within target range  - Assess nutritional intake and initiate nutrition service referral as needed  Outcome: Progressing     Problem: PAIN - ADULT  Goal: Verbalizes/displays adequate comfort level or baseline comfort level  Description: Interventions:  - Encourage patient to monitor pain and request assistance  - Assess pain using appropriate pain scale  - Administer analgesics based on type and severity of pain and evaluate response  - Implement non-pharmacological measures as appropriate and evaluate response  - Consider cultural and social influences on pain and pain management  - Notify physician/advanced practitioner if interventions unsuccessful or patient reports new pain  Outcome: Progressing     Problem: Potential for Falls  Goal: Patient will remain free of falls  Description: INTERVENTIONS:  - Educate patient/family on patient safety including physical limitations  - Instruct patient to call for assistance with activity   - Consult OT/PT to assist with strengthening/mobility   - Keep Call bell within reach  - Keep bed low and locked with side rails adjusted as appropriate  - Keep care items and personal belongings within reach  - Initiate and maintain comfort rounds  - Make Fall Risk Sign visible to staff  - Apply yellow socks and bracelet for high fall risk patients  - Consider moving patient to room near nurses station  Outcome: Progressing

## 2022-11-27 PROBLEM — U07.1 COVID-19: Status: ACTIVE | Noted: 2022-11-27

## 2022-11-27 PROBLEM — R14.0 ABDOMINAL DISTENSION: Status: ACTIVE | Noted: 2022-11-27

## 2022-11-27 LAB
ANION GAP SERPL CALCULATED.3IONS-SCNC: 6 MMOL/L (ref 4–13)
BASOPHILS # BLD AUTO: 0 THOUSANDS/ÂΜL (ref 0–0.1)
BASOPHILS NFR BLD AUTO: 0 % (ref 0–1)
BUN SERPL-MCNC: 5 MG/DL (ref 5–25)
CALCIUM SERPL-MCNC: 8.5 MG/DL (ref 8.3–10.1)
CHLORIDE SERPL-SCNC: 105 MMOL/L (ref 96–108)
CO2 SERPL-SCNC: 24 MMOL/L (ref 21–32)
CREAT SERPL-MCNC: 0.58 MG/DL (ref 0.6–1.3)
EOSINOPHIL # BLD AUTO: 0.02 THOUSAND/ÂΜL (ref 0–0.61)
EOSINOPHIL NFR BLD AUTO: 1 % (ref 0–6)
ERYTHROCYTE [DISTWIDTH] IN BLOOD BY AUTOMATED COUNT: 13.2 % (ref 11.6–15.1)
GFR SERPL CREATININE-BSD FRML MDRD: 93 ML/MIN/1.73SQ M
GLUCOSE SERPL-MCNC: 118 MG/DL (ref 65–140)
GLUCOSE SERPL-MCNC: 127 MG/DL (ref 65–140)
GLUCOSE SERPL-MCNC: 130 MG/DL (ref 65–140)
GLUCOSE SERPL-MCNC: 131 MG/DL (ref 65–140)
GLUCOSE SERPL-MCNC: 139 MG/DL (ref 65–140)
GLUCOSE SERPL-MCNC: 153 MG/DL (ref 65–140)
HCT VFR BLD AUTO: 35.1 % (ref 34.8–46.1)
HGB BLD-MCNC: 11.1 G/DL (ref 11.5–15.4)
IMM GRANULOCYTES # BLD AUTO: 0.02 THOUSAND/UL (ref 0–0.2)
IMM GRANULOCYTES NFR BLD AUTO: 1 % (ref 0–2)
LYMPHOCYTES # BLD AUTO: 1.27 THOUSANDS/ÂΜL (ref 0.6–4.47)
LYMPHOCYTES NFR BLD AUTO: 42 % (ref 14–44)
MCH RBC QN AUTO: 29.1 PG (ref 26.8–34.3)
MCHC RBC AUTO-ENTMCNC: 31.6 G/DL (ref 31.4–37.4)
MCV RBC AUTO: 92 FL (ref 82–98)
MONOCYTES # BLD AUTO: 0.37 THOUSAND/ÂΜL (ref 0.17–1.22)
MONOCYTES NFR BLD AUTO: 12 % (ref 4–12)
NEUTROPHILS # BLD AUTO: 1.35 THOUSANDS/ÂΜL (ref 1.85–7.62)
NEUTS SEG NFR BLD AUTO: 44 % (ref 43–75)
NRBC BLD AUTO-RTO: 0 /100 WBCS
PLATELET # BLD AUTO: 187 THOUSANDS/UL (ref 149–390)
PMV BLD AUTO: 11.9 FL (ref 8.9–12.7)
POTASSIUM SERPL-SCNC: 3.9 MMOL/L (ref 3.5–5.3)
RBC # BLD AUTO: 3.82 MILLION/UL (ref 3.81–5.12)
SODIUM SERPL-SCNC: 135 MMOL/L (ref 135–147)
WBC # BLD AUTO: 3.03 THOUSAND/UL (ref 4.31–10.16)

## 2022-11-27 RX ORDER — LEVOTHYROXINE SODIUM 0.03 MG/1
25 TABLET ORAL
Status: DISCONTINUED | OUTPATIENT
Start: 2022-11-28 | End: 2022-11-28 | Stop reason: HOSPADM

## 2022-11-27 RX ORDER — FLUTICASONE FUROATE AND VILANTEROL 100; 25 UG/1; UG/1
1 POWDER RESPIRATORY (INHALATION)
Status: DISCONTINUED | OUTPATIENT
Start: 2022-11-28 | End: 2022-11-27

## 2022-11-27 RX ADMIN — DORZOLAMIDE HYDROCHLORIDE AND TIMOLOL MALEATE 1 DROP: 20; 5 SOLUTION/ DROPS OPHTHALMIC at 08:17

## 2022-11-27 RX ADMIN — INSULIN LISPRO 1 UNITS: 100 INJECTION, SOLUTION INTRAVENOUS; SUBCUTANEOUS at 12:05

## 2022-11-27 RX ADMIN — FAMOTIDINE 20 MG: 10 INJECTION, SOLUTION INTRAVENOUS at 08:15

## 2022-11-27 RX ADMIN — DEXTROSE, SODIUM CHLORIDE, AND POTASSIUM CHLORIDE 84 ML/HR: 5; .45; .15 INJECTION INTRAVENOUS at 01:44

## 2022-11-27 RX ADMIN — ENOXAPARIN SODIUM 40 MG: 40 INJECTION SUBCUTANEOUS at 08:15

## 2022-11-27 RX ADMIN — FLUTICASONE PROPIONATE 1 SPRAY: 50 SPRAY, METERED NASAL at 08:17

## 2022-11-27 NOTE — PROGRESS NOTES
Progress Note -  [facility]  Benji Bridges 81 y o  female MRN: [id number]  Unit/Bed#: [facility] 834-01 Encounter: [id number]      ASSESSMENT AND PLAN:      74-year-old female with extensive past medical history of smoldering multiple myeloma, ileocolonic Crohn's diagnosed in her 35s status post proctocolectomy with ileoanal J pouch in 3025 complicated by small-bowel obstruction, pouch stenosis status post dilation 2015, perforation status post ex lap in 2019 admitted for right lower quadrant pain  GI is consulted for further management  1  Abdominal distension, pain  Likely secondary to stenosis  Does not appear completely obstructed as she is passing flatus and having small quantity bowel movements  CT enterography does show inflammation in the small bowel  NG tube discontinued yesterday  Status post manual dilation with Colorectal surgery  · Continue clear liquid diet per surgery  · Given improvement in symptoms will hold off on flexible sigmoidoscopy at this point  · Pain control per primary team   · P r n  Zofran for nausea  · Management of Crohn's disease as described below  2  Crohn's disease  With complicated surgical history  Previously on Entyvio and recently switched to [place], last dose was in October  Patient reports no improvement in symptoms  Unclear if currently an active flare  Last fecal calprotectin May was 300s  Albumin currently 4 4  CDAI 155, indicating active Crohns  Fecal calprotectin currently pending  CT shows evidence of inflammation small bowel  • Continue DVT prophylaxis  • Continue diet as tolerated  • Continue Stelara as an outpatient  • Given improvement symptoms will hold off on initiating steroid therapy  • Will need outpatient follow-up for Crohn's management including flexible sigmoidoscopy for restaging  Message sent to staff to schedule  3  COVID positive  Found incidentally on admission    Patient denies any chest pain or shortness of breath  · Management per primary team     Gastroenterology to sign off at this time  Please contact with any questions  Rest of care per primary team     ______________________________________________________________________    Subjective:  Seen and examined  Symptoms have vastly improved  She denies any significant abdominal pain  Has had multiple bowel movements  Denies any nausea or vomiting  Tolerating diet  REVIEW OF SYSTEMS:    Review of Systems   Constitutional: Negative for chills and fever  HENT: Negative for congestion and sinus pressure  Respiratory: Negative for cough and shortness of breath  Cardiovascular: Negative for chest pain, palpitations and leg swelling  Gastrointestinal: Negative for abdominal distention, abdominal pain, diarrhea, nausea and vomiting  Genitourinary: Negative for dysuria and hematuria  Musculoskeletal: Negative for arthralgias and back pain  Skin: Negative for color change and rash  Neurological: Negative for dizziness and headaches  Psychiatric/Behavioral: Negative for agitation and confusion  All other systems reviewed and are negative           Historical Information   Past Medical History:   Diagnosis Date   • Asthma    • COPD [facility]chronic obstructive pulmonary disease) ([facility] )    • Crohn's disease ([facility] )    • Diabetes mellitus ([facility] )    • Hypertension    • Multiple myeloma ([facility] )    • Ocular hypertension      Past Surgical History:   Procedure Laterality Date   • CATARACT EXTRACTION     • COLON SURGERY      Partial colectomy, partial removal of the rectum   • EXPLORATORY LAPAROTOMY     • ILEOSTOMY     • IR BIOPSY BONE MARROW  10/13/2021     Social History   Social History     Substance and Sexual Activity   Alcohol Use Never     Social History     Substance and Sexual Activity   Drug Use Never     Social History     Tobacco Use   Smoking Status Never   Smokeless Tobacco Never     Family History   Problem Relation Age of Onset   • Diabetes Mother    • Hypertension Mother        Meds/Allergies     Medications Prior to Admission   Medication   • Ascorbic Acid (Vitamin C) 500 MG CAPS   • bimatoprost (LUMIGAN) 0 01 % ophthalmic drops   • dorzolamide-timolol (COSOPT) 22 3-6 8 MG/ML ophthalmic solution   • famotidine (PEPCID) 40 MG tablet   • ferrous sulfate 325 (65 FE) MG EC tablet   • fluticasone (FLONASE) 50 mcg/act nasal spray   • ipratropium (ATROVENT) 0 03 % nasal spray   • levothyroxine 25 mcg tablet   • loperamide (IMODIUM) 2 mg capsule   • Multiple Vitamins-Minerals (OCUTABS-LUTEIN) TABS   • omeprazole (PriLOSEC) 20 mg delayed release capsule   • ondansetron (ZOFRAN) 4 mg tablet   • potassium chloride (K-DUR,KLOR-CON) 20 mEq tablet   • simethicone (MYLICON) 80 mg chewable tablet   • albuterol (PROVENTIL HFA,VENTOLIN HFA) 90 mcg/act inhaler   • atorvastatin (LIPITOR) 10 mg tablet   • Cyanocobalamin (B-12) 5000 MCG CAPS   • Entyvio SOLR   • Folic Acid 20 MG CAPS   • mometasone (NASONEX) 50 mcg/act nasal spray   • mometasone-formoterol (DULERA) 200-5 MCG/ACT inhaler   • Nebulizers (Nebulizer Compressor) MISC   • timolol (TIMOPTIC) 0 25 % ophthalmic solution   • ustekinumab (Stelara) 90 mg/mL subcutaneous injection   • vedolizumab (Entyvio) SOLR   • Zinc 50 MG CAPS     Current Facility-Administered Medications   Medication Dose Route Frequency   • al mag oxide-diphenhydramine-lidocaine viscous (MAGIC MOUTHWASH) suspension 10 mL  10 mL Swish & Spit Q4H PRN   • dextrose 5 % and sodium chloride 0 45 % with KCl 20 mEq/L infusion  84 mL/hr Intravenous Continuous   • dorzolamide-timolol (COSOPT) 22 3-6 8 MG/ML ophthalmic solution 1 drop  1 drop Both Eyes Daily   • enoxaparin (LOVENOX) subcutaneous injection 40 mg  40 mg Subcutaneous Q24H CAROLYN   • Famotidine (PF) (PEPCID) injection 20 mg  20 mg Intravenous Q24H CAROLYN   • fluticasone (FLONASE) 50 mcg/act nasal spray 1 spray  1 spray Each Nare Daily   • HYDROmorphone (DILAUDID) injection 0 5 mg  0 5 mg Intravenous Q4H PRN   • HYDROmorphone HCl (DILAUDID) injection 0 2 mg  0 2 mg Intravenous Q4H PRN   • insulin lispro (HumaLOG) 100 units/mL subcutaneous injection 1-5 Units  1-5 Units Subcutaneous 4x Daily (AC & HS)   • phenol (CHLORASEPTIC) 1 4 % mucosal liquid 1 spray  1 spray Mouth/Throat Q2H PRN       Allergies   Allergen Reactions   • Brimonidine      Red eyes   • Tramadol Nausea Only           Objective     Blood pressure 110/59, pulse 77, temperature 97 9 °F (36 6 °C), temperature source Temporal, resp  rate 16, height 5' 2" (1 575 m), weight 54 kg (119 lb), SpO2 97 %  Body mass index is 21 77 kg/m²  Intake/Output Summary (Last 24 hours) at 11/27/2022 0706  Last data filed at 11/27/2022 0142  Gross per 24 hour   Intake 1713 ml   Output --   Net 1713 ml         PHYSICAL EXAM:      Physical Exam  Vitals and nursing note reviewed  Constitutional:       General: She is not in acute distress  Appearance: Normal appearance  She is well-developed  She is not ill-appearing  HENT:      Head: Normocephalic and atraumatic  Mouth/Throat:      Mouth: Mucous membranes are moist    Eyes:      Extraocular Movements: Extraocular movements intact  Conjunctiva/sclera: Conjunctivae normal    Cardiovascular:      Rate and Rhythm: Normal rate and regular rhythm  Pulses: Normal pulses  Heart sounds: Normal heart sounds  No murmur heard  No friction rub  No gallop  Pulmonary:      Effort: Pulmonary effort is normal  No respiratory distress  Breath sounds: Normal breath sounds  No wheezing or rales  Abdominal:      General: Abdomen is flat  Bowel sounds are normal  There is no distension  Palpations: Abdomen is soft  Tenderness: There is no abdominal tenderness  There is no guarding  Musculoskeletal:      Cervical back: Neck supple  Right lower leg: No edema  Left lower leg: No edema  Skin:     General: Skin is warm and dry  Neurological:      General: No focal deficit present  Mental Status: She is alert and oriented to person, place, and time     Psychiatric:         Mood and Affect: Mood normal          Behavior: Behavior normal           Lab Results:   Admission on 11/22/2022   Component Date Value   • WBC 11/23/2022 5 62    • RBC 11/23/2022 4 87    • Hemoglobin 11/23/2022 14 2    • Hematocrit 11/23/2022 44 2    • MCV 11/23/2022 91    • MCH 11/23/2022 29 2    • MCHC 11/23/2022 32 1    • RDW 11/23/2022 13 4    • MPV 11/23/2022 11 1    • Platelets 35/10/9861 249    • nRBC 11/23/2022 0    • Neutrophils Relative 11/23/2022 69    • Immat GRANS % 11/23/2022 1    • Lymphocytes Relative 11/23/2022 11 (L)    • Monocytes Relative 11/23/2022 16 (H)    • Eosinophils Relative 11/23/2022 3    • Basophils Relative 11/23/2022 0    • Neutrophils Absolute 11/23/2022 3 90    • Immature Grans Absolute 11/23/2022 0 06    • Lymphocytes Absolute 11/23/2022 0 63    • Monocytes Absolute 11/23/2022 0 88    • Eosinophils Absolute 11/23/2022 0 14    • Basophils Absolute 11/23/2022 0 01    • Sodium 11/23/2022 138    • Potassium 11/23/2022 3 8    • Chloride 11/23/2022 103    • CO2 11/23/2022 25    • ANION GAP 11/23/2022 10    • BUN 11/23/2022 20    • Creatinine 11/23/2022 0 91    • Glucose 11/23/2022 116    • Calcium 11/23/2022 9 9    • eGFR 11/23/2022 64    • Magnesium 11/23/2022 2 0    • POC Glucose 11/23/2022 103    • POC Glucose 11/23/2022 86    • POC Glucose 11/23/2022 80    • POC Glucose 11/23/2022 69    • POC Glucose 11/23/2022 141 (H)    • POC Glucose 11/23/2022 130    • POC Glucose 11/23/2022 155 (H)    • Sodium 11/24/2022 139    • Potassium 11/24/2022 3 6    • Chloride 11/24/2022 105    • CO2 11/24/2022 29    • ANION GAP 11/24/2022 5    • BUN 11/24/2022 9    • Creatinine 11/24/2022 0 70    • Glucose 11/24/2022 172 (H)    • Calcium 11/24/2022 8 6    • eGFR 11/24/2022 88    • WBC 11/24/2022 4 82    • RBC 11/24/2022 4 07    • Hemoglobin 11/24/2022 12 0    • Hematocrit 11/24/2022 37 5    • MCV 11/24/2022 92 On fundal check patient gushed blood, 50ml approximately. fundus firm and at umbilicus. /58, HR 59, T 36.7, R16, 100O2. denied pain and discomfort.  Pt voided 800ml, 1st PP void. • [address] 11/24/2022 29 5    • MCHC 11/24/2022 32 0    • RDW 11/24/2022 13 6    • Platelets 46/30/1485 184    • MPV 11/24/2022 11 7    • Magnesium 11/24/2022 1 7    • POC Glucose 11/24/2022 166 (H)    • CRP 11/24/2022 75 0 (H)    • POC Glucose 11/24/2022 176 (H)    • POC Glucose 11/24/2022 116    • POC Glucose 11/25/2022 136    • WBC 11/25/2022 6 69    • RBC 11/25/2022 4 81    • Hemoglobin 11/25/2022 14 0    • Hematocrit 11/25/2022 44 5    • MCV 11/25/2022 93    • MCH 11/25/2022 29 1    • MCHC 11/25/2022 31 5    • RDW 11/25/2022 13 5    • MPV 11/25/2022 11 9    • Platelets 32/82/4963 206    • nRBC 11/25/2022 0    • Neutrophils Relative 11/25/2022 53    • Immat GRANS % 11/25/2022 1    • Lymphocytes Relative 11/25/2022 34    • Monocytes Relative 11/25/2022 12    • Eosinophils Relative 11/25/2022 0    • Basophils Relative 11/25/2022 0    • Neutrophils Absolute 11/25/2022 3 57    • Immature Grans Absolute 11/25/2022 0 04    • Lymphocytes Absolute 11/25/2022 2 24    • Monocytes Absolute 11/25/2022 0 82    • Eosinophils Absolute 11/25/2022 0 00    • Basophils Absolute 11/25/2022 0 02    • Sodium 11/25/2022 133 (L)    • Potassium 11/25/2022 4 0    • Chloride 11/25/2022 100    • CO2 11/25/2022 27    • ANION GAP 11/25/2022 6    • BUN 11/25/2022 10    • Creatinine 11/25/2022 0 87    • Glucose 11/25/2022 148 (H)    • Calcium 11/25/2022 9 3    • eGFR 11/25/2022 67    • POC Glucose 11/25/2022 137    • POC Glucose 11/25/2022 203 (H)    • POC Glucose 11/25/2022 168 (H)    • POC Glucose 11/25/2022 134    • Magnesium 11/26/2022 1 8    • Phosphorus 11/26/2022 2 5    • POC Glucose 11/26/2022 145 (H)    • POC Glucose 11/26/2022 163 (H)    • POC Glucose 11/26/2022 141 (H)    • POC Glucose 11/26/2022 123    • POC Glucose 11/27/2022 131        Imaging Studies: I have personally reviewed pertinent imaging studies  [address] [facility]    Gastroenterology Fellow  PGY-4  Available via [facility]  11/27/2022 7:06 AM

## 2022-11-27 NOTE — PROGRESS NOTES
Progress Note - Colorectal Surgery   Lashaun Blade 81 y o  female MRN: [id number]  Unit/Bed#: [facility] 834-01 Encounter: [id number]    Assessment:  81 y o  female with crohn's disease and a complex past surgical history presents with nausea, vomiting, abdominal pain with concern for ileal anal stricture  COVID positive 11/21     AVSS on RA  Urine x 3  Stool x 2     Labs pending    Plan:  - keep clears, consider advance if no GI scope  - if tolerate low res diet consider DC home  - PRN analgesics and antiemetics  - pepcid  - SSI  - DVT ppx  - GI      Subjective/Objective     Subjective:   No acute events overnight  -N  -V  +F  +BM  No pain  Objective:     Blood pressure 112/66, pulse 80, temperature 98 °F (36 7 °C), temperature source Temporal, resp  rate 18, height 5' 2" (1 575 m), weight 54 kg (119 lb), SpO2 96 %  ,Body mass index is 21 77 kg/m²        Intake/Output Summary (Last 24 hours) at 11/27/2022 1559  Last data filed at 11/27/2022 0142  Gross per 24 hour   Intake 1713 ml   Output --   Net 1713 ml       Invasive Devices     Peripheral Intravenous Line  Duration           Peripheral IV 11/25/22 Left;Ventral (anterior) Forearm 2 days                Physical Exam:   Gen: NAD, Comfortable  Neuro: A&O, No focal deficits  Head: Normal Cephalic, Atraumatic  Eye: EOMI, PERRLA, No scleral icterus  Neck: Supple, No JVD, Midline trachea  CV: RRR, Cap refill <2 sec  Pulm: Normal work of breathing, no respiratory distress  Abd: Soft, Distended, Non-Tender  Ext: No edema, Non-tender  Skin: warm, dry, intact

## 2022-11-27 NOTE — PLAN OF CARE
Problem: GASTROINTESTINAL - ADULT  Goal: Minimal or absence of nausea and/or vomiting  Description: INTERVENTIONS:  - Administer IV fluids if ordered to ensure adequate hydration  - Maintain NPO status until nausea and vomiting are resolved  - Nasogastric tube if ordered  - Administer ordered antiemetic medications as needed  - Provide nonpharmacologic comfort measures as appropriate  - Advance diet as tolerated, if ordered  - Consider nutrition services referral to assist patient with adequate nutrition and appropriate food choices  Outcome: Progressing  Goal: Maintains or returns to baseline bowel function  Description: INTERVENTIONS:  - Assess bowel function  - Encourage oral fluids to ensure adequate hydration  - Administer IV fluids if ordered to ensure adequate hydration  - Administer ordered medications as needed  - Encourage mobilization and activity  - Consider nutritional services referral to assist patient with adequate nutrition and appropriate food choices  Outcome: Progressing  Goal: Maintains adequate nutritional intake  Description: INTERVENTIONS:  - Monitor percentage of each meal consumed  - Identify factors contributing to decreased intake, treat as appropriate  - Assist with meals as needed  - Monitor I&O, weight, and lab values if indicated  - Obtain nutrition services referral as needed  Outcome: Progressing     Problem: METABOLIC, FLUID AND ELECTROLYTES - ADULT  Goal: Electrolytes maintained within normal limits  Description: INTERVENTIONS:  - Monitor labs and assess patient for signs and symptoms of electrolyte imbalances  - Administer electrolyte replacement as ordered  - Monitor response to electrolyte replacements, including repeat lab results as appropriate  - Instruct patient on fluid and nutrition as appropriate  Outcome: Progressing  Goal: Fluid balance maintained  Description: INTERVENTIONS:  - Monitor labs   - Monitor I/O and WT  - Instruct patient on fluid and nutrition as appropriate  - Assess for signs & symptoms of volume excess or deficit  Outcome: Progressing  Goal: Glucose maintained within target range  Description: INTERVENTIONS:  - Monitor Blood Glucose as ordered  - Assess for signs and symptoms of hyperglycemia and hypoglycemia  - Administer ordered medications to maintain glucose within target range  - Assess nutritional intake and initiate nutrition service referral as needed  Outcome: Progressing     Problem: PAIN - ADULT  Goal: Verbalizes/displays adequate comfort level or baseline comfort level  Description: Interventions:  - Encourage patient to monitor pain and request assistance  - Assess pain using appropriate pain scale  - Administer analgesics based on type and severity of pain and evaluate response  - Implement non-pharmacological measures as appropriate and evaluate response  - Consider cultural and social influences on pain and pain management  - Notify physician/advanced practitioner if interventions unsuccessful or patient reports new pain  Outcome: Progressing     Problem: Potential for Falls  Goal: Patient will remain free of falls  Description: INTERVENTIONS:  - Educate patient/family on patient safety including physical limitations  - Instruct patient to call for assistance with activity   - Consult OT/PT to assist with strengthening/mobility   - Keep Call bell within reach  - Keep bed low and locked with side rails adjusted as appropriate  - Keep care items and personal belongings within reach  - Initiate and maintain comfort rounds  - Make Fall Risk Sign visible to staff  - Apply yellow socks and bracelet for high fall risk patients  - Consider moving patient to room near nurses station  Outcome: Progressing     Problem: Nutrition/Hydration-ADULT  Goal: Nutrient/Hydration intake appropriate for improving, restoring or maintaining nutritional needs  Description: Monitor and assess patient's nutrition/hydration status for malnutrition   Collaborate with interdisciplinary team and initiate plan and interventions as ordered  Monitor patient's weight and dietary intake as ordered or per policy  Utilize nutrition screening tool and intervene as necessary  Determine patient's food preferences and provide high-protein, high-caloric foods as appropriate       INTERVENTIONS:  - Monitor oral intake, urinary output, labs, and treatment plans  - Assess nutrition and hydration status and recommend course of action  - Evaluate amount of meals eaten  - Assist patient with eating if necessary   - Allow adequate time for meals  - Recommend/ encourage appropriate diets, oral nutritional supplements, and vitamin/mineral supplements  - Order, calculate, and assess calorie counts as needed  - Recommend, monitor, and adjust tube feedings and TPN/PPN based on assessed needs  - Assess need for intravenous fluids  - Provide specific nutrition/hydration education as appropriate  - Include patient/family/caregiver in decisions related to nutrition  Outcome: Progressing

## 2022-11-27 NOTE — PROGRESS NOTES
Progress Note - Colorectal Surgery   Carissa Longo 81 y o  female MRN: [id number]  Unit/Bed#: [facility] 834-01 Encounter: [id number]    Assessment:  81 y o  female with crohn's disease and a complex past surgical history presents with nausea, vomiting, abdominal pain with concern for ileal anal stricture  COVID positive       AVSS on RA  Urine 500  Stool x 1    No labs     PE mild distended, soft      Plan:  - keep clears  - will discuss with medicine about switching to medicine service, GI planning for scope  - PRN analgesics and antiemetics  - pepcid  - SSI  - DVT ppx      Subjective/Objective     Subjective:   No acute events overnight  -N  -V      Objective:     Blood pressure 110/59, pulse 77, temperature 97 9 °F (36 6 °C), temperature source Temporal, resp  rate 16, height 5' 2" (1 575 m), weight 54 kg (119 lb), SpO2 97 %  ,Body mass index is 21 77 kg/m²        Intake/Output Summary (Last 24 hours) at 11/27/2022 0651  Last data filed at 11/27/2022 0142  Gross per 24 hour   Intake 1713 ml   Output --   Net 1713 ml       Invasive Devices     Peripheral Intravenous Line  Duration           Peripheral IV 11/25/22 Left;Ventral (anterior) Forearm 1 day                Physical Exam:   Gen: NAD, Comfortable  Neuro: A&O, No focal deficits  Head: Normal Cephalic, Atraumatic  Eye: EOMI, PERRLA, No scleral icterus  Neck: Supple, No JVD, Midline trachea  CV: RRR, Cap refill <2 sec  Pulm: Normal work of breathing, no respiratory distress  Abd: Soft, mild Distended, Non-Tender  Ext: No edema, Non-tender  Skin: warm, dry, intact

## 2022-11-28 ENCOUNTER — TELEPHONE (OUTPATIENT)
Dept: GASTROENTEROLOGY | Facility: CLINIC | Age: 72
End: 2022-11-28

## 2022-11-28 VITALS
HEIGHT: 62 IN | BODY MASS INDEX: 21.9 KG/M2 | DIASTOLIC BLOOD PRESSURE: 75 MMHG | RESPIRATION RATE: 16 BRPM | HEART RATE: 76 BPM | TEMPERATURE: 97.5 F | OXYGEN SATURATION: 96 % | SYSTOLIC BLOOD PRESSURE: 135 MMHG | WEIGHT: 119 LBS

## 2022-11-28 PROBLEM — R14.0 ABDOMINAL DISTENSION: Status: RESOLVED | Noted: 2022-11-27 | Resolved: 2022-11-28

## 2022-11-28 LAB
ANION GAP SERPL CALCULATED.3IONS-SCNC: 5 MMOL/L (ref 4–13)
BUN SERPL-MCNC: 2 MG/DL (ref 5–25)
CALCIUM SERPL-MCNC: 9.3 MG/DL (ref 8.3–10.1)
CHLORIDE SERPL-SCNC: 107 MMOL/L (ref 96–108)
CO2 SERPL-SCNC: 25 MMOL/L (ref 21–32)
CREAT SERPL-MCNC: 0.74 MG/DL (ref 0.6–1.3)
ERYTHROCYTE [DISTWIDTH] IN BLOOD BY AUTOMATED COUNT: 13.1 % (ref 11.6–15.1)
GFR SERPL CREATININE-BSD FRML MDRD: 82 ML/MIN/1.73SQ M
GLUCOSE SERPL-MCNC: 106 MG/DL (ref 65–140)
GLUCOSE SERPL-MCNC: 106 MG/DL (ref 65–140)
GLUCOSE SERPL-MCNC: 109 MG/DL (ref 65–140)
GLUCOSE SERPL-MCNC: 112 MG/DL (ref 65–140)
GLUCOSE SERPL-MCNC: 97 MG/DL (ref 65–140)
HCT VFR BLD AUTO: 43.1 % (ref 34.8–46.1)
HGB BLD-MCNC: 13.5 G/DL (ref 11.5–15.4)
MAGNESIUM SERPL-MCNC: 2 MG/DL (ref 1.6–2.6)
MCH RBC QN AUTO: 28.8 PG (ref 26.8–34.3)
MCHC RBC AUTO-ENTMCNC: 31.3 G/DL (ref 31.4–37.4)
MCV RBC AUTO: 92 FL (ref 82–98)
PLATELET # BLD AUTO: 264 THOUSANDS/UL (ref 149–390)
PMV BLD AUTO: 11.2 FL (ref 8.9–12.7)
POTASSIUM SERPL-SCNC: 3.8 MMOL/L (ref 3.5–5.3)
RBC # BLD AUTO: 4.68 MILLION/UL (ref 3.81–5.12)
SODIUM SERPL-SCNC: 137 MMOL/L (ref 135–147)
WBC # BLD AUTO: 3.58 THOUSAND/UL (ref 4.31–10.16)

## 2022-11-28 RX ORDER — FAMOTIDINE 20 MG
1 TABLET ORAL DAILY
Refills: 0
Start: 2022-11-28

## 2022-11-28 RX ORDER — GUAIFENESIN/DEXTROMETHORPHAN 100-10MG/5
10 SYRUP ORAL EVERY 4 HOURS PRN
Qty: 118 ML | Refills: 0 | Status: SHIPPED | OUTPATIENT
Start: 2022-11-28

## 2022-11-28 RX ORDER — LISINOPRIL 5 MG/1
5 TABLET ORAL DAILY
COMMUNITY

## 2022-11-28 RX ORDER — ATORVASTATIN CALCIUM 10 MG/1
10 TABLET, FILM COATED ORAL DAILY
COMMUNITY

## 2022-11-28 RX ORDER — MULTIVIT-MINS NO.63/IRON/FOLIC 27 MG-1 MG
1 TABLET ORAL DAILY
Status: ON HOLD | COMMUNITY
End: 2022-11-28 | Stop reason: CLARIF

## 2022-11-28 RX ORDER — GUAIFENESIN/DEXTROMETHORPHAN 100-10MG/5
10 SYRUP ORAL EVERY 4 HOURS PRN
Status: DISCONTINUED | OUTPATIENT
Start: 2022-11-28 | End: 2022-11-28 | Stop reason: HOSPADM

## 2022-11-28 RX ORDER — LEVOTHYROXINE SODIUM 0.12 MG/1
125 TABLET ORAL DAILY
COMMUNITY

## 2022-11-28 RX ORDER — HYDROCHLOROTHIAZIDE 25 MG/1
25 TABLET ORAL DAILY
COMMUNITY

## 2022-11-28 RX ORDER — FLUTICASONE FUROATE AND VILANTEROL 100; 25 UG/1; UG/1
1 POWDER RESPIRATORY (INHALATION) DAILY
COMMUNITY

## 2022-11-28 RX ADMIN — B-COMPLEX W/ C & FOLIC ACID TAB 1 TABLET: TAB at 08:17

## 2022-11-28 RX ADMIN — DEXTROSE, SODIUM CHLORIDE, AND POTASSIUM CHLORIDE 84 ML/HR: 5; .45; .15 INJECTION INTRAVENOUS at 00:22

## 2022-11-28 RX ADMIN — ENOXAPARIN SODIUM 40 MG: 40 INJECTION SUBCUTANEOUS at 08:17

## 2022-11-28 RX ADMIN — FAMOTIDINE 20 MG: 10 INJECTION, SOLUTION INTRAVENOUS at 08:17

## 2022-11-28 RX ADMIN — FLUTICASONE PROPIONATE 1 SPRAY: 50 SPRAY, METERED NASAL at 08:17

## 2022-11-28 RX ADMIN — DORZOLAMIDE HYDROCHLORIDE AND TIMOLOL MALEATE 1 DROP: 20; 5 SOLUTION/ DROPS OPHTHALMIC at 08:17

## 2022-11-28 RX ADMIN — LEVOTHYROXINE SODIUM 25 MCG: 25 TABLET ORAL at 07:00

## 2022-11-28 NOTE — PROGRESS NOTES
[facility]  Progress Note - Keisha Jose 1950, 81 y o  female MRN: [id number]  Unit/Bed#: [facility] 834-01 Encounter: [id number]  Primary Care Provider: Francie Cox DO   Date and time admitted to hospital: 11/22/2022 10:57 PM    * Abdominal distension  Assessment & Plan  Secondary to colonic stricture  Gen surg on board  + flatus and bms  Pt clinically improved  Diet advanced to clears  Cont IVF hydration  Cont to monitor electrolytes      Crohn's disease of small intestine with other complication ([address] )  Assessment & Plan  S/p CT small bowel enterography revealing inflammation  GI on board, doubts acute flare  Pt chronically stelera  Per GI, will defer endoscopy eval as outpt as pt is incidentally covid positive  Cont supportive care    COVID-19  Assessment & Plan  Asymptomatic  No oxygen needs  Cont to monitor    Diabetes mellitus [facility])  Assessment & Plan  Lab Results   Component Value Date    HGBA1C 6 3 (H) 10/05/2022       Recent Labs     11/27/22  0001 11/27/22  0714 11/27/22  1151 11/27/22  1752   POCGLU 131 118 153* 130       Blood Sugar Average: Last 72 hrs:  (P) 146 25   On clears, cont on ISS    COPD (chronic obstructive pulmonary disease) ([facility])  Assessment & Plan  No sign of exacerbation      VTE Pharmacologic Prophylaxis:   Pharmacologic: Enoxaparin (Lovenox)  Mechanical VTE Prophylaxis in Place: No    Patient Centered Rounds: I have performed bedside rounds with nursing staff today  Discussions with Specialists or Other Care Team Provider: Gen surgery    Education and Discussions with Family / Patient: Patient    Time Spent for Care: 30 minutes  More than 50% of total time spent on counseling and coordination of care as described above      Current Length of Stay: 5 day(s)    Current Patient Status: Inpatient   Certification Statement: The patient will continue to require additional inpatient hospital stay due to plan of care per gen surgery, tolerance of diet    Discharge Plan:     Code Status: Level 1 - Full Code      Subjective:   Denies abd pain or distention  Denies dyspenia  Intermittent cought  Objective:     Vitals:   Temp (24hrs), Av °F (36 7 °C), Min:97 9 °F (36 6 °C), Max:98 °F (36 7 °C)    Temp:  [97 9 °F (36 6 °C)-98 °F (36 7 °C)] 98 °F (36 7 °C)  HR:  [77-80] 80  Resp:  [16-18] 18  BP: (110-112)/(59-66) 112/66  SpO2:  [96 %-97 %] 96 %  Body mass index is 21 77 kg/m²  Input and Output Summary (last 24 hours): Intake/Output Summary (Last 24 hours) at 2022  Last data filed at 2022 0142  Gross per 24 hour   Intake 1713 ml   Output --   Net 1713 ml       Physical Exam:     Physical Exam  Cardiovascular:      Rate and Rhythm: Normal rate and regular rhythm  Pulses: Normal pulses  Heart sounds: Normal heart sounds  No murmur heard  Pulmonary:      Effort: Pulmonary effort is normal  No respiratory distress  Breath sounds: Normal breath sounds  No wheezing or rales  Abdominal:      General: Bowel sounds are normal  There is no distension  Palpations: Abdomen is soft  Tenderness: There is no abdominal tenderness  There is no guarding  Musculoskeletal:         General: Normal range of motion  Cervical back: Normal range of motion and neck supple  Right lower leg: No edema  Left lower leg: No edema  Skin:     General: Skin is warm and dry  Neurological:      General: No focal deficit present  Mental Status: She is alert and oriented to person, place, and time  Mental status is at baseline  Cranial Nerves: No cranial nerve deficit  Motor: No weakness             Additional Data:     Labs:    Results from last 7 days   Lab Units 22  0612   WBC Thousand/uL 3 03*   HEMOGLOBIN g/dL 11 1*   HEMATOCRIT % 35 1   PLATELETS Thousands/uL 187   NEUTROS PCT % 44   LYMPHS PCT % 42   MONOS PCT % 12   EOS PCT % 1     Results from last 7 days   Lab Units 22 SODIUM mmol/L 135   POTASSIUM mmol/L 3 9   CHLORIDE mmol/L 105   CO2 mmol/L 24   BUN mg/dL 5   CREATININE mg/dL 0 58*   ANION GAP mmol/L 6   CALCIUM mg/dL 8 5   GLUCOSE RANDOM mg/dL 127         Results from last 7 days   Lab Units 11/27/22  1752 11/27/22  1151 11/27/22  0714 11/27/22  0001 11/26/22  2117 11/26/22  1716 11/26/22  1216 11/26/22  0504 11/25/22  2333 11/25/22  1754 11/25/22  1134 11/25/22  0600   POC GLUCOSE mg/dl 130 153* 118 131 123 141* 163* 145* 134 168* 203* 137                   * I Have Reviewed All Lab Data Listed Above  * Additional Pertinent Lab Tests Reviewed:  All Labs Within Last 24 Hours Reviewed    Imaging:    Imaging Reports Reviewed Today Include:   Imaging Personally Reviewed by Myself Includes:      Recent Cultures (last 7 days):           Last 24 Hours Medication List:   Current Facility-Administered Medications   Medication Dose Route Frequency Provider Last Rate   • al mag oxide-diphenhydramine-lidocaine viscous  10 mL Swish & Spit Q4H PRN Flor Nieves MD     • dextrose 5 % and sodium chloride 0 45 % with KCl 20 mEq/L  84 mL/hr Intravenous Continuous Geena Coronel PA-C 84 mL/hr (11/27/22 0144)   • dorzolamide-timolol  1 drop Both Eyes Daily Geena Coronel PA-C     • enoxaparin  40 mg Subcutaneous Q24H [facility] Merced Alvarado MD     • famotidine  20 mg Intravenous Q24H [facility] Merced Alvarado MD     • fluticasone  1 spray Each Nare Daily Geena Coronel PA-C     • HYDROmorphone  0 5 mg Intravenous Q4H PRN Merced Alvarado MD     • HYDROmorphone  0 2 mg Intravenous Q4H PRN Merced Alvarado MD     • insulin lispro  1-5 Units Subcutaneous 4x Daily [facility] ) Eliel Obando MD     • [START ON 11/28/2022] levothyroxine  25 mcg Oral Early Morning Attila Stover DO     • [START ON 11/28/2022] multivitamin stress formula  1 tablet Oral Daily Gricelda Lopez DO     • phenol  1 spray Mouth/Throat Q2H PRN Kody Cabrera MD          Today, Patient Was Seen By: Anthony Nieto DO    ** Please Note: Dictation voice to text software may have been used in the creation of this document   **

## 2022-11-28 NOTE — TELEPHONE ENCOUNTER
Spoke with patient  OV scheduled for 12/21- Virtual  Pt has an eye appointment at 10:30 but notes she will be home for virtual at 4:40

## 2022-11-28 NOTE — ASSESSMENT & PLAN NOTE
S/p CT small bowel enterography revealing inflammation  GI on board, doubts acute flare  Pt chronically stelera  Per GI, will defer endoscopy eval as outpt as pt is incidentally covid positive  Cont supportive care

## 2022-11-28 NOTE — DISCHARGE SUMMARY
[address] [facility]  Discharge- Geovany [facility] 1950, 81 y o  female MRN: [id number]  Unit/Bed#: [facility] 834-01 Encounter: [id number]  Primary Care Provider: Leland Redd DO   Date and time admitted to hospital: 11/22/2022 10:57 PM    * Abdominal distension  Assessment & Plan  Secondary to colonic stricture; now resolved  Gen surg on board  + flatus and bms  Pt clinically improved  Diet advanced to surgical soft with tolerance  OK to d/c per gen surgery  Pt hx of crohn's disease to have sgmoidoscopy arranged as outpt    Crohn's disease of small intestine with other complication ([facility] )  Assessment & Plan  S/p CT small bowel enterography revealing inflammation  GI on board, doubts acute flare  Pt chronically on stelera  Per GI, will defer endoscopy eval as outpt       COVID-19  Assessment & Plan  Asymptomatic   No oxygen needs  Encouraged to complete quarantine period    Diabetes mellitus [facility])  Assessment & Plan  Lab Results   Component Value Date    HGBA1C 6 3 (H) 10/05/2022       Recent Labs     11/27/22  2147 11/28/22  0710 11/28/22  0745 11/28/22  1116   POCGLU 139 112 109 97       Blood Sugar Average: Last 72 hrs:  (P) 760 [id number]   Resume back on metformin    COPD (chronic obstructive pulmonary disease) ([facility] )  Assessment & Plan  No sign of exacerbation      Discharging Physician / Practitioner: Garth Bueno DO  PCP: Leland Redd DO  Admission Date:   Admission Orders (From admission, onward)     Ordered        11/22/22 2316  Inpatient Admission  Once                      Discharge Date: 11/28/22    Medical Problems     Resolved Problems  Date Reviewed: 11/28/2022   None         Consultations During Hospital Stay:  · Gastroenterology  · Colorectal surgery    Procedures Performed:   · KUB  · CT small bowel enterography    Significant Findings / Test Results:   · See above    Incidental Findings:   ·     Test Results Pending at Discharge (will require follow up):   ·      Outpatient Tests Requested:  ·     Complications: None  Reason for Admission: abd pain, distention    Hospital Course:     HPI: Shey Moore is a 81 y o  female with a history of Crohns and a complex surgical history  Proctocolectomy w/ ileal pouch in 2007 at [facility] c/b SBO and stenosis of pouch requiring dilation  Recurrent SBO in 2019 s/p dilation complicated by perforation with ex lap and loop ileostomy which was eventually reversed later in 2019  Presents with two days of abdominal pain in her RLQ, N, V, diarrhea  CT at OSH images are unavailable, but impression reads "Progression of oral contrast throughout small bowel to the ileorectal anastomosis   The distal small bowel is markedly dilated, and the anastomosis itself appears narrowed, where there is surrounding inflammation or scarring   All of these findings were present on the prior study dated 09/06/2018, and are less pronounced currently " Also happens to be COVID positive  NGT placed at OSH  Please see above list of diagnoses and related plan for additional information  Condition at Discharge: stable     Discharge Day Visit / Exam:     Subjective:  Denies abd pain, distension  + bm, flatus  + Intermittent cough but denies dyspnea  Continues to remains o2 independent    Vitals: Blood Pressure: 113/76 (11/28/22 0755)  Pulse: 76 (11/28/22 0755)  Temperature: 97 5 °F (36 4 °C) (11/28/22 0755)  Temp Source: Temporal (11/28/22 0755)  Respirations: 16 (11/28/22 0755)  Height: 5' 2" (157 5 cm) (11/22/22 2321)  Weight - Scale: 54 kg (119 lb) (11/22/22 2321)  SpO2: 100 % (11/28/22 0755)  Exam:   Physical Exam  Cardiovascular:      Rate and Rhythm: Normal rate and regular rhythm  Pulses: Normal pulses  Heart sounds: Normal heart sounds  No murmur heard  Pulmonary:      Effort: Pulmonary effort is normal  No respiratory distress  Breath sounds: Normal breath sounds  No wheezing or rales     Abdominal:      General: Abdomen is flat  Bowel sounds are normal  There is no distension  Palpations: Abdomen is soft  Tenderness: There is no abdominal tenderness  There is no guarding  Musculoskeletal:         General: Normal range of motion  Cervical back: Normal range of motion and neck supple  Right lower leg: No edema  Left lower leg: No edema  Skin:     General: Skin is warm and dry  Neurological:      General: No focal deficit present  Mental Status: She is alert and oriented to person, place, and time  Mental status is at baseline  Cranial Nerves: No cranial nerve deficit  Motor: No weakness  Discussion with Family: Discussed with patient  I also called her dtr    Discharge instructions/Information to patient and family:   See after visit summary for information provided to patient and family  Provisions for Follow-Up Care:  See after visit summary for information related to follow-up care and any pertinent home health orders  Disposition:     Home    For Discharges to   [address] SNF:   · Not Applicable to this Patient - Not Applicable to this Patient    Planned Readmission: No     Discharge Statement:  I spent 35 minutes discharging the patient  This time was spent on the day of discharge  I had direct contact with the patient on the day of discharge  Greater than 50% of the total time was spent examining patient, answering all patient questions, arranging and discussing plan of care with patient as well as directly providing post-discharge instructions  Additional time then spent on discharge activities  Discharge Medications:  See after visit summary for reconciled discharge medications provided to patient and family        ** Please Note: This note has been constructed using a voice recognition system **

## 2022-11-28 NOTE — UTILIZATION REVIEW
Continued Stay Review    Date: 11/26/22                          Current Patient Class: inpatient  Current Level of Care: med surg    HPI:72 y o  female initially admitted on 11/22/22  Ileal anal stricture J-pouch complicated by small-bowel obstruction, stenosis, ulcerations  COVID-19      Assessment/Plan: Pt's NG tube fell out and replaced on 11/24  Pt having liquid BM, abdomen soft and NT, passing flatus, no ongoing signs of obstruction  NG tube to be removed, clear liquids today, ongoing medical evaluation and GI for possible pouchoscopy  Fu on KUB, continue IVF, pain and nausea control, pepcid, SSI      Vital Signs:   Date/Time Temp Pulse Resp BP MAP (mmHg) SpO2 O2 Device Patient Position - Orthostatic VS   11/28/22 0755 97 5 °F (36 4 °C) 76 16 113/76 -- 100 % -- --   11/27/22 2148 97 9 °F (36 6 °C) 74 18 116/67 83 99 % -- --   11/27/22 2030 -- -- -- -- -- -- None (Room air) --   11/27/22 0959 -- -- -- -- -- -- None (Room air) --   11/27/22 0834 98 °F (36 7 °C) 80 18 112/66 -- 96 % None (Room air) Lying   11/26/22 2243 97 9 °F (36 6 °C) 77 16 110/59 75 97 % -- Lying   11/26/22 2028 -- -- -- -- -- -- None (Room air) --   11/26/22 1535 98 4 °F (36 9 °C) 84 12 135/67 -- 97 % None (Room air) Lying   11/26/22 08:04:19 98 4 °F (36 9 °C) 89 12 117/63 81 95 % None (Room air) Lying   11/26/22 07:38:22 98 4 °F (36 9 °C) 85 22 115/64 81 94 % None (Room air) Lying   11/26/22 0715 -- -- -- -- -- -- None (Room air) --       Pertinent Labs/Diagnostic Results:       Results from last 7 days   Lab Units 11/27/22  0612 11/25/22  0629 11/24/22  0509 11/23/22  0112   WBC Thousand/uL 3 03* 6 69 4 82 5 62   HEMOGLOBIN g/dL 11 1* 14 0 12 0 14 2   HEMATOCRIT % 35 1 44 5 37 5 44 2   PLATELETS Thousands/uL 187 206 184 249   NEUTROS ABS Thousands/µL 1 35* 3 57  --  3 90         Results from last 7 days   Lab Units 11/27/22  0612 11/26/22  0619 11/25/22  0629 11/24/22  0509 11/23/22  0112   SODIUM mmol/L 135  --  133* 139 138   POTASSIUM mmol/L 3 9  --  4 0 3 6 3 8   CHLORIDE mmol/L 105  --  100 105 103   CO2 mmol/L 24  --  27 29 25   ANION GAP mmol/L 6  --  6 5 10   BUN mg/dL 5  --  10 9 20   CREATININE mg/dL 0 58*  --  0 87 0 70 0 91   EGFR ml/min/1 73sq m 93  --  67 88 64   CALCIUM mg/dL 8 5  --  9 3 8 6 9 9   MAGNESIUM mg/dL  --  1 8  --  1 7 2 0   PHOSPHORUS mg/dL  --  2 5  --   --   --          Results from last 7 days   Lab Units 11/28/22  0745 11/28/22  0710 11/27/22  2147 11/27/22  1752 11/27/22  1151 11/27/22  0714 11/27/22  0001 11/26/22  2117 11/26/22  1716 11/26/22  1216 11/26/22  0504 11/25/22  2333   POC GLUCOSE mg/dl 109 112 139 130 153* 118 131 123 141* 163* 145* 134     Results from last 7 days   Lab Units 11/27/22  0612 11/25/22  0629 11/24/22  0509 11/23/22  0112   GLUCOSE RANDOM mg/dL 127 148* 172* 116     Results from last 7 days   Lab Units 11/24/22  0509   CRP mg/L 75 0*     Medications:   Scheduled Medications:  dorzolamide-timolol, 1 drop, Both Eyes, Daily  enoxaparin, 40 mg, Subcutaneous, Q24H CAROLYN  famotidine, 20 mg, Intravenous, Q24H [facility]  fluticasone, 1 spray, Each Nare, Daily  insulin lispro, 1-5 Units, Subcutaneous, 4x Daily (AC & HS)  levothyroxine, 25 mcg, Oral, Early Morning  multivitamin stress formula, 1 tablet, Oral, Daily      Continuous IV Infusions: none     PRN Meds:  al mag oxide-diphenhydramine-lidocaine viscous, 10 mL, Swish & Spit, Q4H PRN  HYDROmorphone, 0 5 mg, Intravenous, Q4H PRN  HYDROmorphone, 0 2 mg, Intravenous, Q4H PRN  phenol, 1 spray, Mouth/Throat, Q2H PRN        Discharge Plan: tbd    Network Utilization Review Department  ATTENTION: Please call with any questions or concerns to [phone] and carefully listen to the prompts so that you are directed to the right person   All voicemails are confidential   Bentley Mir all requests for admission clinical reviews, approved or denied determinations and any other requests to dedicated fax number below belonging to the campus where the patient is receiving treatment   List of dedicated fax numbers for the Facilities:  [address] DENIALS (Administrative/Medical Necessity) [phone]   [address] (Maternity/NICU/Pediatrics) [phone]   Yanet Gabriella Carbone [phone]   Meredith Lees 77 [phone]   [address] [address] [facility] [phone]   [address] [place] Dimitry Banks [facility] 134 815 [facility] [phone]

## 2022-11-28 NOTE — ASSESSMENT & PLAN NOTE
Lab Results   Component Value Date    HGBA1C 6 3 (H) 10/05/2022       Recent Labs     11/27/22  2147 11/28/22  0710 11/28/22  0745 11/28/22  1116   POCGLU 139 112 109 97       Blood Sugar Average: Last 72 hrs:  (P) 163 [id number]   Resume back on metformin

## 2022-11-28 NOTE — TELEPHONE ENCOUNTER
----- Message from Yvonne Camarena DO sent at 11/27/2022 10:57 AM EST -----  Regarding: Office follow-up  Please schedule follow-up with Dr Randall Anderson to discuss IBD management in the next 2-3 weeks if possible

## 2022-11-28 NOTE — ASSESSMENT & PLAN NOTE
Secondary to colonic stricture; now resolved  Gen surg on board  + flatus and bms  Pt clinically improved   Diet advanced to surgical soft with tolerance  OK to d/c per gen surgery  Pt hx of crohn's disease to have sgmoidoscopy arranged as outpt

## 2022-11-28 NOTE — ASSESSMENT & PLAN NOTE
Secondary to colonic stricture  Gen surg on board  + flatus and bms  Pt clinically improved   Diet advanced to clears  Cont IVF hydration  Cont to monitor electrolytes

## 2022-11-28 NOTE — ASSESSMENT & PLAN NOTE
S/p CT small bowel enterography revealing inflammation  GI on board, doubts acute flare  Pt chronically on stelera  Per GI, will defer endoscopy eval as outpt

## 2022-11-28 NOTE — CASE MANAGEMENT
Case Management Discharge Planning Note    Patient name Dulce Head  Location [address]/PPHP 960-07 MRN [id number]  : 1950 Date 2022       Current Admission Date: 2022  Current Admission Diagnosis:Abdominal distension   Patient Active Problem List    Diagnosis Date Noted   • Abdominal distension 2022   • COVID-19 2022   • Hypothyroid 2022   • Protein-calorie malnutrition, unspecified severity ([facility] ) 10/22/2021   • MGUS (monoclonal gammopathy of unknown significance) 2021   • Low iron 2021   • Crohn's disease of small intestine with other complication ([facility] )    • Asthma    • COPD (chronic obstructive pulmonary disease) ([facility] )    • Diabetes mellitus ([facility] )    • Ocular hypertension    • Primary open angle glaucoma of both eyes, moderate stage 2020   • Crohn's disease ([facility] ) 2020   • Hypertension goal BP (blood pressure) < 140/90 10/22/2019   • Status post reversal of ileostomy 10/10/2019   • Gastro-esophageal reflux disease without esophagitis 2015      LOS (days): 6  Geometric Mean LOS (GMLOS) (days): 3 60  Days to GMLOS:-2 1     OBJECTIVE:  Risk of Unplanned Readmission Score: 10         Current admission status: Inpatient   Preferred Pharmacy:   [address] [place] [address] [address] [place] [address]  Phone: [phone] Fax: [address] [address] [place], [address] [address]  Phone: [phone] Fax: [phone]    Primary Care Provider: Modena Ahumada, DO    Primary Insurance: Oscar Noguera [facility] REP  Secondary Insurance:     DISCHARGE DETAILS:    PT for DC today  Needs ride home  Discussed W/C transport  Advised she may receive a bill  Transport requested via [employer]    Notified Collin pt + ARIELLA JONES notified dghter of DC    [facility] transport 5PM, pt notified

## 2022-11-28 NOTE — ASSESSMENT & PLAN NOTE
Lab Results   Component Value Date    HGBA1C 6 3 (H) 10/05/2022       Recent Labs     11/27/22  0001 11/27/22  0714 11/27/22  1151 11/27/22  1752   POCGLU 131 118 153* 130       Blood Sugar Average: Last 72 hrs:  (P) 146 25   On clears, cont on ISS

## 2022-11-29 LAB — CALPROTECTIN STL-MCNT: 3477 UG/G (ref 0–120)

## 2022-11-29 NOTE — UTILIZATION REVIEW
NOTIFICATION OF ADMISSION DISCHARGE   This is a Notification of Discharge from [address]  Please be advised that this patient has been discharge from our facility  Below you will find the admission and discharge date and time including the patient’s disposition  UTILIZATION REVIEW CONTACT:  Jovani Landa  Utilization   Network Utilization Review Department  Phone: [phone] x carefully listen to the prompts  All voicemails are confidential   Email: [email or web address]     ADMISSION INFORMATION  PRESENTATION DATE: 11/22/2022 10:57 PM  OBERVATION ADMISSION DATE:   INPATIENT ADMISSION DATE: 11/22/22 10:57 PM   DISCHARGE DATE: 11/28/2022  5:48 PM   DISPOSITION:Home/Self Care    IMPORTANT INFORMATION:  Send all requests for admission clinical reviews, approved or denied determinations and any other requests to dedicated fax number below belonging to the campus where the patient is receiving treatment   List of dedicated fax numbers:  [address] DENIALS (Administrative/Medical Necessity) [phone]   [address] (Maternity/NICU/Pediatrics) [phone]   [facility] [phone]   [place] [address] [phone]   [address] Gaiola [address] [phone]   [address] [facility] [phone] 90 [facility] [phone]   [address] [place] [address] 119 [phone]   [facility]  [phone]   4056 [facility] [phone]   412 [facility] [address] [facility] [phone]

## 2022-12-21 ENCOUNTER — TELEPHONE (OUTPATIENT)
Dept: GASTROENTEROLOGY | Facility: CLINIC | Age: 72
End: 2022-12-21

## 2022-12-21 ENCOUNTER — TELEMEDICINE (OUTPATIENT)
Dept: GASTROENTEROLOGY | Facility: CLINIC | Age: 72
End: 2022-12-21

## 2022-12-21 DIAGNOSIS — K50.018 CROHN'S DISEASE OF SMALL INTESTINE WITH OTHER COMPLICATION (HCC): Primary | ICD-10-CM

## 2022-12-21 DIAGNOSIS — D50.9 IRON DEFICIENCY ANEMIA, UNSPECIFIED IRON DEFICIENCY ANEMIA TYPE: ICD-10-CM

## 2022-12-21 DIAGNOSIS — R19.7 DIARRHEA, UNSPECIFIED TYPE: ICD-10-CM

## 2022-12-21 DIAGNOSIS — K50.018 CROHN'S DISEASE OF SMALL INTESTINE WITH OTHER COMPLICATION (HCC): ICD-10-CM

## 2022-12-21 DIAGNOSIS — R10.84 GENERALIZED ABDOMINAL PAIN: ICD-10-CM

## 2022-12-21 DIAGNOSIS — D84.9 IMMUNOCOMPROMISED PATIENT (HCC): ICD-10-CM

## 2022-12-21 RX ORDER — DIPHENOXYLATE HYDROCHLORIDE AND ATROPINE SULFATE 2.5; .025 MG/1; MG/1
1 TABLET ORAL 4 TIMES DAILY PRN
Qty: 60 TABLET | Refills: 1 | Status: SHIPPED | OUTPATIENT
Start: 2022-12-21

## 2022-12-21 NOTE — PROGRESS NOTES
Virtual Regular Visit    Verification of patient location:    Patient is located in the following state in which I hold an active license [place]      Assessment/Plan:    The patient is a 68-year-old woman with smoldering multiple myeloma, inflammatory bowel disease initially diagnosed with ulcerative colitis status post proctocolectomy with ileal pouch anal anastomosis in 0246, complicated by small bowel obstruction in 2019 with stricture in the pouch at the anastomosis and adhesions status post dilation of the stricture and complicated by microperforation for which she underwent an exploratory laparotomy, lysis of adhesions, creation of a loop ileostomy and eventually ileostomy reversal in 2019, recent admission for nausea vomiting and abdominal pain in the setting of COVID positivity and after eating a Thanksgiving meal with imaging that revealed markedly dilated distal small bowel with some surrounding inflammation concerning for possible obstruction  She had an NG tube placed and she underwent manual dilation with colorectal surgery with significant improvement of her symptoms  Presents today for follow-up since hospital admission  She has been having some gas pains and diarrhea  Of note she was started on Entyvio in September 2020 and received her first infusion but did not follow-up for additional infusions  She was restarted on Entyvio in November but then had interruption of treatment again until February 2021 secondary to insurance issues  After that she had been on Entyvio every 8 weeks  She had also been receiving iron infusions for iron deficiency anemia  In December 2021 she had significant diarrhea and elevation of her CRP and calprotectin, C  difficile negative, colonoscopy showing anal stricture that was dilated as well as inflammation and ulcerations of the pouch  Subsequent CT enterography in February 2022 showed active inflammation in the distal ileum and pouch into strictures    Michelle Sanchez level was checked and it was low so she had been managed on prednisone taper and the infusions of Entyvio were increased to every 4 weeks around March 2022  Had been delayed and then restarted around April or May 2022  Colonoscopy in March 2020 with anal stricture that was dilated, aphthous ulcerations in the ileal limb leading into the pouch concerning for Crohn's disease  Pouchoscopy from 6/2022 with multiple large superficial irregular ulcers 50 cm from the anal verge, mild anal stricture, 2 areas of narrowing in the pouch although they easily admit the scope, ulcerations in these areas of narrowing, erythematous anastomosis  CT enterography from 2020 with ileitis, pouchitis, high-grade narrowing in the ileal pouch anal anastomosis  CT enterography from November 2022 with active inflammatory small bowel Crohn's with luminal narrowing and stricture involving the pouch, anal anastomosis and distal ileum/pouch anastomosis  It appeared when compared to prior study that the degree of enhancement and thickening was less severe  No penetrating perianal disease  Cholelithiasis noted  Recent blood work with relatively normal BMP  CBC with white blood cell count of 3 5 ED with normal hemoglobin, MCV, platelets  Prior CMP with albumin of 3, slightly elevated corrected calcium and fasting glucose but otherwise normal   CRP was 75  Prior calprotectin was 341          Problem List Items Addressed This Visit        Digestive    Crohn's disease of small intestine with other complication ([facility] ) - Primary    Relevant Orders    CBC and differential    Comprehensive metabolic panel    C-reactive protein    Quantiferon TB Gold Plus    Chronic Hepatitis Panel    Ustekinumab and Anti-Ustek Ab   Other Visit Diagnoses     Immunocompromised patient ([facility] )        Relevant Orders    CBC and differential    Comprehensive metabolic panel    C-reactive protein    Quantiferon TB Gold Plus    Chronic Hepatitis Panel Ustekinumab and Anti-Ustek Ab    Generalized abdominal pain        Diarrhea, unspecified type        Relevant Medications    diphenoxylate-atropine (LOMOTIL) 2 5-0 025 mg per tablet    Banana Flakes (Banatrol plus) PACK    Iron deficiency anemia, unspecified iron deficiency anemia type              Continue Stelara every 8 weeks  Can check level but also try to increase to every 4 weeks  Consider adding methotrexate 25 mg once a week  Repeat pouchoscopy next spring 2023  Next full set of blood work right before Stelara injection  Next QuantiFERON gold and hepatitis panel May 2023  Consider permanent ileostomy in the future if she would be agreeable  We discussed this previously and she was not agreeable  Drink at least 8 cups of water per day  Low residue diet  Low fiber diet  Avoid eating 3-4 hours prior to bedtime  Can get opinion from Dr Nikunj Hendricks at [facility] in [facility] for consideration of endoscopic treatment of her strictures  Follow-up with [facility]    To be discussed at future visit:  Avoid live virus vaccines  Yearly flu shot  COVID vaccine and booster  Pneumonia vaccine  Shingrix  DEXA screen  Routine Pap smears and mammograms but just as indicated for routine surveillance  Routine skin exams with the dermatologist             Reason for visit is   Chief Complaint   Patient presents with   • Follow-up     Pt f/l  from hospital   • Abdominal Pain     Pt said abd pain, sometime gasses   • Diarrhea     Pt said she has always diarrhea, watery and color yellowish and brown   • Constipation   • Virtual Regular Visit        Encounter provider Natalie Whiting MD    Provider located at [address] [address] [phone]      Recent Visits  No visits were found meeting these conditions    Showing recent visits within past 7 days and meeting all other requirements  Today's Visits  Date Type Provider Dept   12/21/22 [facility]phone [facility] [place] [facility]   12/21/22 Telemedicine MD Mina Yates [facility]   Showing today's visits and meeting all other requirements  Future Appointments  No visits were found meeting these conditions  Showing future appointments within next 150 days and meeting all other requirements       The patient was identified by name and date of birth  Gavin Gerardo was informed that this is a telemedicine visit and that the visit is being conducted through Telephone  My office door was closed  No one else was in the room  She acknowledged consent and understanding of privacy and security of the video platform  The patient has agreed to participate and understands they can discontinue the visit at any time  Patient is aware this is a billable service  It was my intent to perform this visit via video technology but the patient was not able to do a video connection so the visit was completed via audio telephone only  Subjective  Gavin Gerardo is a 81 y o  female with Crohn's here for follow-up  She was feeling sick and still tired  Since coming home she has been eating better  She got a sinus infection and just started a z-pack today  + cough, nasal congestion  The coughing is irritating her stomach  She has some generalized abdominal pain but she thinks it is gas  She has been passing gas and she feels better  + loose stools  8+ stools per day, with nocturnal BMs and some nighttime leakage and incontinence  No blood in the stool         Past Medical History:   Diagnosis Date   • Asthma    • COPD (chronic obstructive pulmonary disease) ([facility] )    • COVID    • [facility]'s disease [facility] )    • Diabetes mellitus ([facility] )    • Hypertension    • Multiple myeloma ([facility] )    • Ocular hypertension        Past Surgical History:   Procedure Laterality Date   • CATARACT EXTRACTION     • COLON SURGERY      Partial colectomy, partial removal of the rectum   • EXPLORATORY LAPAROTOMY     • ILEOSTOMY     • IR BIOPSY BONE MARROW  10/13/2021       Current Outpatient Medications   Medication Sig Dispense Refill   • albuterol (PROVENTIL HFA,VENTOLIN HFA) 90 mcg/act inhaler Inhale     • Ascorbic Acid (Vitamin C) 500 MG CAPS Take 1 capsule by mouth in the morning     • Banana Flakes (Banatrol plus) PACK Take 1 each by mouth 2 (two) times a day 75 each 1   • Cyanocobalamin (B-12) 5000 MCG CAPS Take 1 capsule by mouth in the morning     • dextromethorphan-guaiFENesin (ROBITUSSIN DM)  mg/5 mL syrup Take 10 mL by mouth every 4 (four) hours as needed for cough 118 mL 0   • diphenoxylate-atropine (LOMOTIL) 2 5-0 025 mg per tablet Take 1 tablet by mouth 4 (four) times a day as needed for diarrhea 60 tablet 1   • dorzolamide-timolol (COSOPT) 22 3-6 8 MG/ML ophthalmic solution 1 drop 2 times daily- 8 am and 8 pm- both eyes     • ferrous sulfate 325 (65 FE) MG EC tablet Take 325 mg by mouth 3 (three) times a day with meals     • fluticasone (FLONASE) 50 mcg/act nasal spray 1 spray daily     • Fluticasone Furoate-Vilanterol 100-25 mcg/actuation inhaler Inhale 1 puff daily Rinse mouth after use  • hydrochlorothiazide (HYDRODIURIL) 25 mg tablet Take 25 mg by mouth daily     • levothyroxine 125 mcg tablet Take 125 mcg by mouth daily     • lisinopril (ZESTRIL) 5 mg tablet Take 5 mg by mouth daily     • metFORMIN (GLUCOPHAGE) 500 mg tablet Take 500 mg by mouth 2 (two) times a day with meals     • Multiple Vitamins-Minerals (Ocutabs-Lutein) TABS Take 1 tablet by mouth in the morning  0   • ustekinumab (Stelara) 90 mg/mL subcutaneous injection Inject 1 mL (90 mg total) under the skin every 56 days 1 mL 7   • Zinc 50 MG CAPS Take by mouth     • atorvastatin (LIPITOR) 10 mg tablet Take 10 mg by mouth daily (Patient not taking: Reported on 12/21/2022)       No current facility-administered medications for this visit          Allergies   Allergen Reactions   • Brimonidine      Red eyes   • Tramadol Nausea Only     REVIEW OF SYSTEMS:  10 point ROS reviewed and negative, except as above      PHYSICAL EXAMINATION:  Unable to perform physical examination given the unavailability of a video application         I spent 15 minutes directly with the patient during this visit

## 2022-12-28 RX ORDER — USTEKINUMAB 90 MG/ML
90 INJECTION, SOLUTION SUBCUTANEOUS
Qty: 1 ML | Refills: 11 | Status: SHIPPED | OUTPATIENT
Start: 2022-12-28

## 2022-12-28 NOTE — TELEPHONE ENCOUNTER
Zane was approved q 4 weeks  Can you please send the new RX in to [address]  I will let her know was approved

## 2023-01-04 ENCOUNTER — TELEPHONE (OUTPATIENT)
Dept: GASTROENTEROLOGY | Facility: CLINIC | Age: 73
End: 2023-01-04

## 2023-01-04 NOTE — TELEPHONE ENCOUNTER
Patients GI provider:  Dr Noemy Chavis    Number to return call: [phone]    Reason for call: Pt calling because she needs transportation set up to take her to the lab for blood work     Scheduled procedure/appointment date if applicable: N/A

## 2023-01-06 NOTE — TELEPHONE ENCOUNTER
Spoke to patient and was letting her know that unfortunately we do not provide transportation for lab work

## 2023-01-31 ENCOUNTER — LAB (OUTPATIENT)
Dept: LAB | Facility: CLINIC | Age: 73
End: 2023-01-31

## 2023-01-31 DIAGNOSIS — K50.018 CROHN'S DISEASE OF SMALL INTESTINE WITH OTHER COMPLICATION (HCC): ICD-10-CM

## 2023-01-31 DIAGNOSIS — D84.9 IMMUNOCOMPROMISED PATIENT (HCC): ICD-10-CM

## 2023-01-31 LAB
ALBUMIN SERPL BCP-MCNC: 3.3 G/DL (ref 3.5–5)
ALP SERPL-CCNC: 100 U/L (ref 46–116)
ALT SERPL W P-5'-P-CCNC: 35 U/L (ref 12–78)
ANION GAP SERPL CALCULATED.3IONS-SCNC: 4 MMOL/L (ref 4–13)
AST SERPL W P-5'-P-CCNC: 18 U/L (ref 5–45)
BASOPHILS # BLD AUTO: 0.05 THOUSANDS/ÂΜL (ref 0–0.1)
BASOPHILS NFR BLD AUTO: 1 % (ref 0–1)
BILIRUB SERPL-MCNC: 0.54 MG/DL (ref 0.2–1)
BUN SERPL-MCNC: 13 MG/DL (ref 5–25)
CALCIUM ALBUM COR SERPL-MCNC: 10.1 MG/DL (ref 8.3–10.1)
CALCIUM SERPL-MCNC: 9.5 MG/DL (ref 8.3–10.1)
CHLORIDE SERPL-SCNC: 103 MMOL/L (ref 96–108)
CO2 SERPL-SCNC: 29 MMOL/L (ref 21–32)
CREAT SERPL-MCNC: 0.93 MG/DL (ref 0.6–1.3)
CRP SERPL QL: 3.5 MG/L
EOSINOPHIL # BLD AUTO: 0.06 THOUSAND/ÂΜL (ref 0–0.61)
EOSINOPHIL NFR BLD AUTO: 1 % (ref 0–6)
ERYTHROCYTE [DISTWIDTH] IN BLOOD BY AUTOMATED COUNT: 13.3 % (ref 11.6–15.1)
GFR SERPL CREATININE-BSD FRML MDRD: 62 ML/MIN/1.73SQ M
GLUCOSE P FAST SERPL-MCNC: 111 MG/DL (ref 65–99)
HCT VFR BLD AUTO: 42.9 % (ref 34.8–46.1)
HGB BLD-MCNC: 13.6 G/DL (ref 11.5–15.4)
IMM GRANULOCYTES # BLD AUTO: 0.03 THOUSAND/UL (ref 0–0.2)
IMM GRANULOCYTES NFR BLD AUTO: 1 % (ref 0–2)
LYMPHOCYTES # BLD AUTO: 1.67 THOUSANDS/ÂΜL (ref 0.6–4.47)
LYMPHOCYTES NFR BLD AUTO: 38 % (ref 14–44)
MCH RBC QN AUTO: 29.2 PG (ref 26.8–34.3)
MCHC RBC AUTO-ENTMCNC: 31.7 G/DL (ref 31.4–37.4)
MCV RBC AUTO: 92 FL (ref 82–98)
MONOCYTES # BLD AUTO: 0.47 THOUSAND/ÂΜL (ref 0.17–1.22)
MONOCYTES NFR BLD AUTO: 11 % (ref 4–12)
NEUTROPHILS # BLD AUTO: 2.07 THOUSANDS/ÂΜL (ref 1.85–7.62)
NEUTS SEG NFR BLD AUTO: 48 % (ref 43–75)
NRBC BLD AUTO-RTO: 0 /100 WBCS
PLATELET # BLD AUTO: 259 THOUSANDS/UL (ref 149–390)
PMV BLD AUTO: 12.5 FL (ref 8.9–12.7)
POTASSIUM SERPL-SCNC: 4.2 MMOL/L (ref 3.5–5.3)
PROT SERPL-MCNC: 7.9 G/DL (ref 6.4–8.4)
RBC # BLD AUTO: 4.65 MILLION/UL (ref 3.81–5.12)
SODIUM SERPL-SCNC: 136 MMOL/L (ref 135–147)
WBC # BLD AUTO: 4.35 THOUSAND/UL (ref 4.31–10.16)

## 2023-02-01 LAB
HBV CORE AB SER QL: NORMAL
HBV CORE IGM SER QL: NORMAL
HBV SURFACE AG SER QL: NORMAL
HCV AB SER QL: NORMAL

## 2023-02-01 NOTE — RESULT ENCOUNTER NOTE
Hi,    Can you please let the patient know her blood work came back and overall looks improved  Her inflammatory marker has improved significantly and there is no anemia  Can we also set her up for a follow-up with me? It can be virtual      Thank you!

## 2023-02-02 ENCOUNTER — TELEPHONE (OUTPATIENT)
Dept: GASTROENTEROLOGY | Facility: CLINIC | Age: 73
End: 2023-02-02

## 2023-02-02 LAB
GAMMA INTERFERON BACKGROUND BLD IA-ACNC: 0.07 IU/ML
M TB IFN-G BLD-IMP: NEGATIVE
M TB IFN-G CD4+ BCKGRND COR BLD-ACNC: -0.01 IU/ML
M TB IFN-G CD4+ BCKGRND COR BLD-ACNC: 0 IU/ML
MITOGEN IGNF BCKGRD COR BLD-ACNC: 5.32 IU/ML

## 2023-02-02 NOTE — TELEPHONE ENCOUNTER
----- Message from Les Lewis MD sent at 2/1/2023  6:35 AM EST -----  Hi,    Can you please let the patient know her blood work came back and overall looks improved  Her inflammatory marker has improved significantly and there is no anemia  Can we also set her up for a follow-up with me? It can be virtual      Thank you!

## 2023-02-08 LAB
USTEKINUMAB AB SERPL IA-MCNC: <40 NG/ML
USTEKINUMAB SERPL IA-MCNC: 7 UG/ML

## 2023-03-28 ENCOUNTER — TELEPHONE (OUTPATIENT)
Dept: OBGYN CLINIC | Facility: OTHER | Age: 73
End: 2023-03-28

## 2023-03-28 NOTE — TELEPHONE ENCOUNTER
Appointment Confirmation (to confirm pre existing appointments - ONLY)  No need to route   Who is calling to confirm? Patient   If someone other than patient is calling, are they listed on the communication consent form?  N/A   Appointment with  Aashish Yadav   Appointment date & time  4/26/23 1030   Appointment location [facility]   Patient verbilized understanding Yes

## 2023-03-29 ENCOUNTER — APPOINTMENT (OUTPATIENT)
Dept: LAB | Facility: CLINIC | Age: 73
End: 2023-03-29

## 2023-03-29 DIAGNOSIS — E61.1 LOW IRON: ICD-10-CM

## 2023-03-29 DIAGNOSIS — R10.84 ABDOMINAL PAIN, GENERALIZED: ICD-10-CM

## 2023-03-29 DIAGNOSIS — D47.2 SMOLDERING MULTIPLE MYELOMA (SMM): ICD-10-CM

## 2023-03-29 DIAGNOSIS — E11.9 DIABETES MELLITUS WITHOUT COMPLICATION (HCC): ICD-10-CM

## 2023-03-29 DIAGNOSIS — K50.90 CROHN'S DISEASE WITHOUT COMPLICATION, UNSPECIFIED GASTROINTESTINAL TRACT LOCATION (HCC): ICD-10-CM

## 2023-03-29 LAB
ALBUMIN SERPL BCP-MCNC: 3.2 G/DL (ref 3.5–5)
ALP SERPL-CCNC: 83 U/L (ref 46–116)
ALT SERPL W P-5'-P-CCNC: 43 U/L (ref 12–78)
ANION GAP SERPL CALCULATED.3IONS-SCNC: 2 MMOL/L (ref 4–13)
AST SERPL W P-5'-P-CCNC: 27 U/L (ref 5–45)
BASOPHILS # BLD AUTO: 0.05 THOUSANDS/ÂΜL (ref 0–0.1)
BASOPHILS NFR BLD AUTO: 1 % (ref 0–1)
BILIRUB SERPL-MCNC: 0.59 MG/DL (ref 0.2–1)
BUN SERPL-MCNC: 15 MG/DL (ref 5–25)
CALCIUM ALBUM COR SERPL-MCNC: 10.4 MG/DL (ref 8.3–10.1)
CALCIUM SERPL-MCNC: 9.8 MG/DL (ref 8.3–10.1)
CHLORIDE SERPL-SCNC: 101 MMOL/L (ref 96–108)
CHOLEST SERPL-MCNC: 162 MG/DL
CO2 SERPL-SCNC: 32 MMOL/L (ref 21–32)
CREAT SERPL-MCNC: 0.78 MG/DL (ref 0.6–1.3)
EOSINOPHIL # BLD AUTO: 0.05 THOUSAND/ÂΜL (ref 0–0.61)
EOSINOPHIL NFR BLD AUTO: 1 % (ref 0–6)
ERYTHROCYTE [DISTWIDTH] IN BLOOD BY AUTOMATED COUNT: 13.8 % (ref 11.6–15.1)
FERRITIN SERPL-MCNC: 77 NG/ML (ref 8–388)
GFR SERPL CREATININE-BSD FRML MDRD: 77 ML/MIN/1.73SQ M
GLUCOSE P FAST SERPL-MCNC: 93 MG/DL (ref 65–99)
HCT VFR BLD AUTO: 42.9 % (ref 34.8–46.1)
HDLC SERPL-MCNC: 122 MG/DL
HGB BLD-MCNC: 13.5 G/DL (ref 11.5–15.4)
IGA SERPL-MCNC: 152 MG/DL (ref 70–400)
IGG SERPL-MCNC: 1760 MG/DL (ref 700–1600)
IGM SERPL-MCNC: 124 MG/DL (ref 40–230)
IMM GRANULOCYTES # BLD AUTO: 0.02 THOUSAND/UL (ref 0–0.2)
IMM GRANULOCYTES NFR BLD AUTO: 0 % (ref 0–2)
LDLC SERPL CALC-MCNC: 24 MG/DL (ref 0–100)
LYMPHOCYTES # BLD AUTO: 1.53 THOUSANDS/ÂΜL (ref 0.6–4.47)
LYMPHOCYTES NFR BLD AUTO: 31 % (ref 14–44)
MCH RBC QN AUTO: 28.8 PG (ref 26.8–34.3)
MCHC RBC AUTO-ENTMCNC: 31.5 G/DL (ref 31.4–37.4)
MCV RBC AUTO: 92 FL (ref 82–98)
MONOCYTES # BLD AUTO: 0.5 THOUSAND/ÂΜL (ref 0.17–1.22)
MONOCYTES NFR BLD AUTO: 10 % (ref 4–12)
NEUTROPHILS # BLD AUTO: 2.8 THOUSANDS/ÂΜL (ref 1.85–7.62)
NEUTS SEG NFR BLD AUTO: 57 % (ref 43–75)
NRBC BLD AUTO-RTO: 0 /100 WBCS
PLATELET # BLD AUTO: 314 THOUSANDS/UL (ref 149–390)
PMV BLD AUTO: 12.2 FL (ref 8.9–12.7)
POTASSIUM SERPL-SCNC: 3.6 MMOL/L (ref 3.5–5.3)
PROT SERPL-MCNC: 8.1 G/DL (ref 6.4–8.4)
RBC # BLD AUTO: 4.69 MILLION/UL (ref 3.81–5.12)
SODIUM SERPL-SCNC: 135 MMOL/L (ref 135–147)
TRIGL SERPL-MCNC: 80 MG/DL
WBC # BLD AUTO: 4.95 THOUSAND/UL (ref 4.31–10.16)

## 2023-03-30 ENCOUNTER — APPOINTMENT (OUTPATIENT)
Dept: LAB | Facility: CLINIC | Age: 73
End: 2023-03-30

## 2023-03-30 LAB
KAPPA LC FREE SER-MCNC: 29.4 MG/L (ref 3.3–19.4)
KAPPA LC FREE/LAMBDA FREE SER: 0.8 {RATIO} (ref 0.26–1.65)
LAMBDA LC FREE SERPL-MCNC: 36.7 MG/L (ref 5.7–26.3)

## 2023-03-31 LAB
ALBUMIN SERPL ELPH-MCNC: 4.32 G/DL (ref 3.5–5)
ALBUMIN SERPL ELPH-MCNC: 56.1 % (ref 52–65)
ALPHA1 GLOB SERPL ELPH-MCNC: 0.39 G/DL (ref 0.1–0.4)
ALPHA1 GLOB SERPL ELPH-MCNC: 5 % (ref 2.5–5)
ALPHA2 GLOB SERPL ELPH-MCNC: 0.85 G/DL (ref 0.4–1.2)
ALPHA2 GLOB SERPL ELPH-MCNC: 11.1 % (ref 7–13)
BETA GLOB ABNORMAL SERPL ELPH-MCNC: 0.39 G/DL (ref 0.4–0.8)
BETA1 GLOB SERPL ELPH-MCNC: 5.1 % (ref 5–13)
BETA2 GLOB SERPL ELPH-MCNC: 2.7 % (ref 2–8)
BETA2+GAMMA GLOB SERPL ELPH-MCNC: 0.21 G/DL (ref 0.2–0.5)
EST. AVERAGE GLUCOSE BLD GHB EST-MCNC: 134 MG/DL
GAMMA GLOB ABNORMAL SERPL ELPH-MCNC: 1.54 G/DL (ref 0.5–1.6)
GAMMA GLOB SERPL ELPH-MCNC: 20 % (ref 12–22)
HBA1C MFR BLD: 6.3 %
IGG/ALB SER: 1.28 {RATIO} (ref 1.1–1.8)
IRON SATN MFR SERPL: 9 % (ref 15–50)
IRON SERPL-MCNC: 40 UG/DL (ref 50–170)
M PROTEIN 1 MFR SERPL ELPH: 11.1 %
M PROTEIN 1 SERPL ELPH-MCNC: 0.85 G/DL
PROT PATTERN SERPL ELPH-IMP: ABNORMAL
PROT SERPL-MCNC: 7.7 G/DL (ref 6.4–8.2)
TIBC SERPL-MCNC: 440 UG/DL (ref 250–450)

## 2023-04-24 NOTE — PROGRESS NOTES
HEMATOLOGY CLINIC NOTE    Primary Care Provider: Johnny Fierro MD  Referring Provider: Nazia Jurado  MRN: [id number]  : 1950    Assessment / Plan:   1  Smoldering multiple myeloma (SMM)  This is a 59-year-old female who presented to our clinic 2021 for iron deficiency anemia  It was found that she also had elevated protein despite low albumin, hypercalcemia, elevated alk-phos  She was worked up for possible plasma neoplasm  SPEP M spike was found IgG lambda at 1 03  Bone survey showed no bone lesions in 2021  A bone marrow biopsy was obtained showing smoldering multiple myeloma plasma cell neoplasm 15%  She is currently on observation  She had a CT myeloma scan 2022 which was unrevealing  Most recent labs as follows --> 3/2023: WBC 4 95, Hgb 13 5, MCV 92, PLT 314K, diff normal  CMP showed AG 2, corrected calcium 10 4, alk phos 83, total protein 8 1  albumin 3 2  overall acceptable  [place] free lt chain 29 4, Lambda free lt chain 36 7, K/L ratio 0 80  IgG 1760  SPEP M spike 0 85g/dL  Ferritin 77, iron 40, TIBC 440, iron sat 9%  Patient offers no complaints  No red flag signs/symptoms  She will continue to be monitored every 6 months with labs prior     - CBC and differential; Future  - Comprehensive metabolic panel; Future  - IgG, IgA, IgM; Future  - Immunoglobulin free LT chains blood; Future  - Protein electrophoresis, serum; Future  - Protein electrophoresis, urine; Future  - Iron Panel (Includes Ferritin, Iron Sat%, Iron, and TIBC); Future    2  Crohn's disease of small intestine with other complication ([facility] [address] )  Well-controlled per patient  No bleeding anywhere  Continues to follow GI closely  3  Low iron  Iron panel slightly low  Does suggest some chronic disease with elevated ferritin and low other iron indices  No indication for IV iron at this time  She will continue increasing iron in diet + oral iron EOD with Vitamin C   She offers no complaints and has increased energy  Repeat Iron panel, CBC-D in 6m  She understands if Crohn's flare ups become frequent, bleeding occurs, or other anemic like s/s occur that I previously educated her on to call our office  · Discussion of decision making    I personally reviewed the following lab results, the image studies, pathology, other specialty/physicians consult notes and recommendations, and outside medical records from [facility]  I had a lengthy discussion with the patient and shared the work-up findings  I spent 20 minutes reviewing the records (labs, clinician notes, outside records, medical history, ordering medicine/tests/procedures, interpreting the imaging/labs previously done) and coordination of care as well as direct time with the patient today, of which greater than 50% of the time was spent in counseling and coordination of care with the patient/family  · Plan/Labs  · CBC, CMP, SPEP, serum free light chain, immunoglobulin testing, UPEP in 6m  · No indication for treatment at this time  Will continue to monitor  · Iron will be repeated in 6 months  Continue oral iron EOD with Vitamin C  IV iron PRN  No need at this time  · Continue F/U with GI    Follow Up: 6 months     All questions were answered to the patient's satisfaction during this encounter  The patient knows the contact information for our office and knows to reach out for any relevant concerns related to this encounter  They are to call for any temperature 100 4 or higher, new symptoms including but not restricted to shaking chills, decreased appetite, nausea, vomiting, diarrhea, increased fatigue, shortness of breath or chest pain, confusion, and not feeling the strength to come to the clinic  For all other listed problems and medical diagnosis in their chart - they are managed by PCP and/or other specialists, which the patient acknowledges  Thank you very much for your consultation and making us a part of this patient's care   We are continuing to follow closely with you  Please do not hesitate to reach out to me with any additional questions or concerns  Reason for visit:       Chief Complaint   Patient presents with   • Follow-up     6 month        History of Hematology Illness:     Parmjit Ellison is a 73 y o  female who came in for follow up  1  Smoldering Multiple Myeloma, IgG Lambda  - 8/2021 SPEP --> M spike 1 03  K/L ratio 0 78  SPEP ordered because of high total protein at 9 2, albumin 3 2  Corrected calcium 10 4, alk phos 132  PTH WNL  Bone survey negative for lytic lesions  Immunoglobulin free light chain showed normal kappa/lambda ratio  Prompted BMBx --> 10/2021 --> BMBx --> 15% plasma cell neoplasm found with 15% lambda restricted plasma cells in normocellular marrow (40% cellularity)      - 10/2022: WBC 5 29, Hgb 14 2, MCV 93, PLT 305K, diff acceptable  CMP showed corrected calcium 10 6, rest acceptable  SPEP showed M spike 1  11  Kappa 27 8, Lambda 36 9, K/L ratio 0 75  11/2022 CT myeloma scan showed no bone lesions/concerning findings  - 3/2023: WBC 4 95, Hgb 13 5, MCV 92, PLT 314K, diff normal  CMP showed AG 2, corrected calcium 10 4, alk phos 83, total protein 8 1  albumin 3 2  overall acceptable  [place] free lt chain 29 4, Lambda free lt chain 36 7, K/L ratio 0 80  IgG 1760  SPEP M spike 0 85g/dL  Ferritin 77, iron 40, TIBC 440, iron sat 9%       2  History of Iron Deficiency Anemia   - secondary to Chronic GI bleeding, malabsorption from Crohn's   - 8/18/2021: Hgb 13 7g/dL, MCV 87    -8/27/2021: Ferritin 20, iron 55, TIBC 446, iron sat 12%  - 2019: last colonoscopy unremarkable per pt  - 9/2021: S/P Venofer 300mg x2  - 3/2022: Hgb 12 4g/dL, MCV 92  Ferritin 20, iron 31, TIBC 404, iron sat 8%  -10/2022: Hgb 14 2g/dL, MCV 93  Ferritin 193, iron 43, TIBC 406, iron sat 11%  - 3/2023: Hgb 13 5, MCV 92  Ferritin 77, iron 40, TIBC 440, iron sat 9%  Taking oral iron EOD without major complaints      3  Crohn's Disease  - was "being treated with Vedolizumab until 4/2022  Closely following GI       4  Mild Reactive Thrombocytosis, now resolved  - since at least 8/2020  - ranges in high 300s-400s  - likely reactive due to iron deficiency + inflammation from Crohn's disease    Interval History:   08/27/21: This is a 78year old female with PMH of Crohn's disease, DM, COPD, Asthma, ocular hypertension presenting for consultation       Patient states she has fatigue, PICA for ice, frequent bleeding from the rectum typical for her Crohn's disease   No shortness of breath, no lightheadedness   No constitutional symptoms such as fever, chills, night sweats, lumps, bumps, sudden weight loss   No abdominal pain   Patient does state she has bone pain in her right femur   Not joint related      Patient's last colonoscopy was 2019, she is due for 1 soon   She has had Crohn's disease since the 90s   No menstrual bleeding or bleeding into urine   Diet is well balanced   No other malabsorption of conditions   She currently takes B12       She has never had IV iron before or blood transfusion   She currently takes oral iron   Does not smoke or drink  [facility]atin[facility] personal history of cancer   No family history of cancer  [place] Thompson is up-to-date on her cancer screenings      9/17/2021:  Patient came in for follow-up  Denies any complaints  She does not have any constitutional symptoms at this time  Still has some lingering bone pain      \"10/22/2021:  Came for follow-up doing okay body weight stable  No bone pain, bleeding easy bruise no other complaints\"    4/22/2022:  Patient came in for follow-up  She offers no specific complaints  States she has no constitutional symptoms such as fever, chills, night sweats, lumps, bumps, sudden weight loss  She has no bone pain  Does have joint pain in bilateral knees which is chronic  She has a very good appetite and has actually gained a little bit of weight    She was receiving infusion treatment for Crohn's " disease  However, this was stopped earlier this month  10/25/2022:  Patient came in for follow-up  She does mention some right upper thigh and right lower leg pain which is chronic  Otherwise, no other pains  No constitutional symptoms such as fevers, chills, night sweats, enlarged lymphadenopathy, other lumps or bumps, sudden weight loss  Good appetite  Continues to gain some weight  Crohns is stable per patient  4/26/2023: Patient came in for follow-up  She is doing well and has no complaints  She feels she actually has more energy than usual   She is taking oral iron without any issues  No constitutional symptoms as above  No bone pain  No worsening peripheral neuropathy  No appetite changes  Crohn's disease stable for patient  No bleeding anywhere  Problem list:       Patient Active Problem List   Diagnosis   • Asthma   • COPD (chronic obstructive pulmonary disease) ([facility] )   • Diabetes mellitus ([facility] )   • Ocular hypertension   • Crohn's disease of small intestine with other complication ([place] [address] )   • Low iron   • Protein-calorie malnutrition, unspecified severity ([place] [address] )       REVIEW OF SYMPTOMS:   Review of Systems   Constitutional: Negative for activity change, appetite change, chills, diaphoresis, fatigue, fever and unexpected weight change  HENT: Negative for nosebleeds  Eyes: Negative for visual disturbance  Respiratory: Negative for cough and shortness of breath  Cardiovascular: Negative for chest pain, palpitations and leg swelling  Gastrointestinal: Negative for abdominal pain, anal bleeding, blood in stool, constipation, diarrhea, nausea and vomiting  Endocrine: Negative for cold intolerance  Genitourinary: Negative for hematuria, menstrual problem and vaginal bleeding  Musculoskeletal: Negative for arthralgias  Skin: Negative for color change, pallor and rash  Neurological: Negative for dizziness, syncope, light-headedness and headaches     Hematological: Negative for "adenopathy  Does not bruise/bleed easily  Psychiatric/Behavioral: Negative for sleep disturbance  PHYSICAL EXAMINATION:     Vital Signs:   /82 (BP Location: Left arm, Patient Position: Sitting)   Pulse 75   Resp 14   Ht 5' 3\" (1 6 m)   Wt 54 4 kg (120 lb)   SpO2 98%   BMI 21 26 kg/m²   Body surface area is 1 56 meters squared  Ht Readings from Last 8 Encounters:   04/22/22 5' 3\" (1 6 m)   03/01/22 5' 3\" (1 6 m)   01/04/22 5' 3\" (1 6 m)   12/10/21 5' 3\" (1 6 m)   10/22/21 5' 3\" (1 6 m)   10/13/21 5' 3\" (1 6 m)   09/17/21 5' 3\" (1 6 m)   08/27/21 5' 3\" (1 6 m)       Wt Readings from Last 8 Encounters:   04/22/22 54 4 kg (120 lb)   03/01/22 54 3 kg (119 lb 12 8 oz)   01/04/22 51 6 kg (113 lb 12 8 oz)   12/14/21 48 8 kg (107 lb 9 4 oz)   12/10/21 49 3 kg (108 lb 9 6 oz)   10/22/21 51 3 kg (113 lb)   10/13/21 50 8 kg (112 lb)   09/17/21 53 3 kg (117 lb 8 oz)          Physical Exam  Constitutional:       General: She is not in acute distress  Appearance: Normal appearance  She is not ill-appearing, toxic-appearing or diaphoretic  HENT:      Head: Normocephalic and atraumatic  Eyes:      General: No scleral icterus  Extraocular Movements: Extraocular movements intact  Conjunctiva/sclera: Conjunctivae normal       Pupils: Pupils are equal, round, and reactive to light  Cardiovascular:      Rate and Rhythm: Normal rate and regular rhythm  Pulmonary:      Effort: Pulmonary effort is normal  No respiratory distress  Abdominal:      Tenderness: There is no abdominal tenderness  Musculoskeletal:         General: No swelling or tenderness  Normal range of motion  Cervical back: Normal range of motion and neck supple  Right lower leg: No edema  Left lower leg: No edema  Skin:     General: Skin is warm and dry  Coloration: Skin is not jaundiced or pale  Findings: No bruising, erythema, lesion or rash  Neurological:      General: No focal deficit present      " Mental Status: She is alert and oriented to person, place, and time  Mental status is at baseline  Motor: No weakness  Psychiatric:         Mood and Affect: Mood normal          Behavior: Behavior normal          Thought Content: Thought content normal          Judgment: Judgment normal        Reviewed historical information        PAST MEDICAL HISTORY:    Past Medical History:   Diagnosis Date   • Asthma    • COPD (chronic obstructive pulmonary disease) ([facility] )    • Crohn's disease ([facility] )    • Diabetes mellitus ([facility] )    • Hypertension    • Multiple myeloma ([facility] )    • Ocular hypertension        PAST SURGICAL HISTORY:    Past Surgical History:   Procedure Laterality Date   • CATARACT EXTRACTION     • COLON SURGERY      Partial colectomy, partial removal of the rectum   • EXPLORATORY LAPAROTOMY     • ILEOSTOMY     • IR BIOPSY BONE MARROW  10/13/2021         CURRENT MEDICATIONS:     Current Outpatient Medications:   •  albuterol (PROVENTIL HFA,VENTOLIN HFA) 90 mcg/act inhaler, Inhale, Disp: , Rfl:   •  Ascorbic Acid (Vitamin C) 500 MG CAPS, Take by mouth, Disp: , Rfl:   •  bimatoprost (LUMIGAN) 0 01 % ophthalmic drops, Administer 1 drop to both eyes daily at bedtime, Disp: , Rfl:   •  Cyanocobalamin (B-12) 5000 MCG CAPS, Take by mouth, Disp: , Rfl:   •  dorzolamide-timolol (COSOPT) 22 3-6 8 MG/ML ophthalmic solution, 1 drop 2 times daily- 8 am and 8 pm- both eyes, Disp: , Rfl:   •  Entyvio SOLR, 300mg IV on week 0, 2, 6 and then every 8 weeks, Disp: 1 each, Rfl: 6  •  famotidine (PEPCID) 40 MG tablet, , Disp: , Rfl:   •  ferrous sulfate 325 (65 FE) MG EC tablet, Take 325 mg by mouth 3 (three) times a day with meals, Disp: , Rfl:   •  fluticasone (FLONASE) 50 mcg/act nasal spray, , Disp: , Rfl:   •  Folic Acid 20 MG CAPS, Take by mouth  , Disp: , Rfl:   •  ipratropium (ATROVENT) 0 03 % nasal spray,  , Disp: , Rfl:   •  levothyroxine 25 mcg tablet, Take by mouth, Disp: , Rfl:   •  loperamide (IMODIUM) 2 mg capsule, Take 2 mg by mouth 4 (four) times a day as needed for diarrhea, Disp: , Rfl:   •  mometasone (NASONEX) 50 mcg/act nasal spray, 2 sprays into each nostril, Disp: , Rfl:   •  mometasone-formoterol (DULERA) 200-5 MCG/ACT inhaler, Inhale, Disp: , Rfl:   •  Multiple Vitamins-Minerals (OCUTABS-LUTEIN) TABS, Take by mouth, Disp: , Rfl:   •  Nebulizers (Nebulizer Compressor) MISC, Inhale, Disp: , Rfl:   •  omeprazole (PriLOSEC) 20 mg delayed release capsule, , Disp: , Rfl:   •  ondansetron (ZOFRAN) 4 mg tablet, Take 1 tablet (4 mg total) by mouth every 8 (eight) hours as needed for nausea or vomiting, Disp: 60 tablet, Rfl: 0  •  potassium chloride (KLOR-CON M20) 20 mEq tablet, Take 20 mEq by mouth, Disp: , Rfl:   •  simethicone (MYLICON) 80 mg chewable tablet, Chew 80 mg  , Disp: , Rfl:   •  timolol (TIMOPTIC) 0 25 % ophthalmic solution, , Disp: , Rfl:   •  vedolizumab (Entyvio) SOLR, Infuse 300 mg into a venous catheter every 56 days, Disp: 1 each, Rfl: 6  •  Zinc 50 MG CAPS, Take by mouth, Disp: , Rfl:     Family History   Social History     Tobacco Use   • Smoking status: Never Smoker   • Smokeless tobacco: Never Used   Vaping Use   • Vaping Use: Never used   Substance Use Topics   • Alcohol use: No   • Drug use: No   te    WBC 4 27 (L) 0  Family History   Problem Relation Age of Onset   • Diabetes Mother    • Hypertension Mother    3/29/2022    HGB 12 4 03/29/2022    HCT 40 3 03/29/2022    MCV 92 03/29/2022     (H) 03/29/2022   sults   Component Value Date    WBC 4 27 (L) 03/29/2022    HGB 12 4 03/29/2022    HCT 40 3 03/29/2022    MCV 92 03/29/2022     (H) 03/29/2022      Component Value Date    SODIUM 136 03/29/2022    K 3 8 03/29/2022     03/29/2022    CO2 31 03/29/2022    AGAP 2 (L) 03/29/2022    BUN 13 03/29/2022    CREATININE 0 86 03/29/2022    GLUC 90 11/06/2020    GLUF 89 03/29/2022    CALCIUM 9 5 03/29/2022    AST 17 03/29/2022    ALT 21 03/29/2022    ALKPHOS 77 03/29/2022    TP 7 6 03/29/2022    TP 7 4 03/29/2022    TBILI 0 27 03/29/2022    EGFR 69 03/29/2022       IMAGING:  CT small bowel enterography  Narrative: CT ABDOMEN AND PELVIS WITH IV CONTRAST- ENTEROGRAPHY - WITH CONTRAST    INDICATION:   [id number]: Crohn's disease of small intestine with unspecified complications  COMPARISON:  CT abdomen pelvis 8/31/2020    TECHNIQUE:  Contrast-enhanced CT examination of the abdomen and pelvis was performed utilizing thin section technique and after the administration of low density enteric contrast according to protocol designed specifically to obtain sensitive evaluation   of the small bowel  Axial, sagittal, and coronal 2D reformatted images were created from the source data and submitted for interpretation  Radiation dose length product (DLP) for this visit:  433 mGy-cm   This examination, like all CT scans performed in the [facility], was performed utilizing techniques to minimize radiation dose exposure, including the use of iterative   reconstruction and automated exposure control  IV Contrast:  100 mL of iohexol (OMNIPAQUE)  Enteric Contrast:  1350 cc of low density enteric contrast Madi De Los Santos) was administered  FINDINGS:     ENTEROGRAPHY:  -Small bowel distension: Adequate  -Bowel: Diseased bowel segments as follows:      -Segments: 2  *  Length/location: Short segment luminal narrowing at the distal ileum/pouch anastomosis measuring 2 3 cm (series 601 image 44)  Luminal narrowing at the pouch anal anastomosis measuring approximately 2 0 cm (series 601 image 103)  These are similar   to the prior study  *  Segmental mural hyperenhancement: Stratified (bilaminar or trilaminar)  *  Wall thickening: Mild wall thickening of the distal ileum /pouch consistent with active inflammation similar to prior  *  Stricture: Stricture with moderate to severe upstream dilation (>4 cm) similar to prior  *  Ulcerations: Probable  *  Sacculations: Absent    *  Penetrating complication: None  -Mesenteric findings: Probably reactive mesenteric lymph nodes measuring less than 1 cm in short axis diameter similar to prior   -Extraintestinal findings: Cholelithiasis  APPENDIX:  Surgically absent    STOMACH:  Unremarkable  REMAINDER OF THE ABDOMEN AND PELVIS:    ABDOMEN    LOWER CHEST:  No change in mild right basilar atelectasis/scarring with associated bronchiolectasis  LIVER/BILIARY TREE:  One or more subcentimeter sharply circumscribed low-density hepatic lesion(s) are noted, too small to accurately characterize, but statistically most likely to represent subcentimeter hepatic cysts  Hepatic contours are normal     The liver enhances heterogeneously similar to prior  No biliary dilatation  GALLBLADDER:  Cholelithiasis without pericholecystic inflammatory changes  SPLEEN:  Unremarkable  PANCREAS:  Unremarkable  ADRENAL GLANDS:  Unremarkable  KIDNEYS/URETERS:  Unremarkable  No hydronephrosis  ABDOMINOPELVIC CAVITY:  No ascites  No pneumoperitoneum  VESSELS:  Unremarkable for patient's age  PELVIS    REPRODUCTIVE ORGANS:  Unremarkable for patient's age  URINARY BLADDER:  Unremarkable  ABDOMINAL WALL/INGUINAL REGIONS:  Unremarkable  OSSEOUS STRUCTURES:  No acute fracture or destructive osseous lesion  Impression: 1  Mild active inflammation in the distal ileum/pouch with two strictures at the distal ileum /pouch anastomosis and ileoanal anastomosis as above  Severe small bowel dilation throughout the abdomen and pelvis  Findings are similar to prior CT  2   Cholelithiasis      Workstation performed: [id number]

## 2023-04-26 ENCOUNTER — TELEPHONE (OUTPATIENT)
Dept: OTHER | Facility: OTHER | Age: 73
End: 2023-04-26

## 2023-04-26 ENCOUNTER — OFFICE VISIT (OUTPATIENT)
Dept: HEMATOLOGY ONCOLOGY | Facility: CLINIC | Age: 73
End: 2023-04-26

## 2023-04-26 ENCOUNTER — TELEMEDICINE (OUTPATIENT)
Dept: GASTROENTEROLOGY | Facility: CLINIC | Age: 73
End: 2023-04-26

## 2023-04-26 VITALS
OXYGEN SATURATION: 99 % | BODY MASS INDEX: 21.71 KG/M2 | DIASTOLIC BLOOD PRESSURE: 72 MMHG | RESPIRATION RATE: 16 BRPM | WEIGHT: 118 LBS | SYSTOLIC BLOOD PRESSURE: 122 MMHG | HEART RATE: 73 BPM | TEMPERATURE: 97.7 F | HEIGHT: 62 IN

## 2023-04-26 DIAGNOSIS — E11.69 TYPE 2 DIABETES MELLITUS WITH OTHER SPECIFIED COMPLICATION, WITHOUT LONG-TERM CURRENT USE OF INSULIN (HCC): ICD-10-CM

## 2023-04-26 DIAGNOSIS — K50.018 CROHN'S DISEASE OF SMALL INTESTINE WITH OTHER COMPLICATION (HCC): Primary | ICD-10-CM

## 2023-04-26 DIAGNOSIS — D84.9 IMMUNOCOMPROMISED PATIENT (HCC): ICD-10-CM

## 2023-04-26 DIAGNOSIS — D47.2 SMOLDERING MULTIPLE MYELOMA (SMM): Primary | ICD-10-CM

## 2023-04-26 DIAGNOSIS — D50.9 IRON DEFICIENCY ANEMIA, UNSPECIFIED IRON DEFICIENCY ANEMIA TYPE: ICD-10-CM

## 2023-04-26 DIAGNOSIS — E46 PROTEIN-CALORIE MALNUTRITION, UNSPECIFIED SEVERITY (HCC): ICD-10-CM

## 2023-04-26 DIAGNOSIS — K21.9 GASTRO-ESOPHAGEAL REFLUX DISEASE WITHOUT ESOPHAGITIS: ICD-10-CM

## 2023-04-26 DIAGNOSIS — J44.9 CHRONIC OBSTRUCTIVE PULMONARY DISEASE, UNSPECIFIED COPD TYPE (HCC): ICD-10-CM

## 2023-04-26 DIAGNOSIS — K50.819 CROHN'S DISEASE OF SMALL AND LARGE INTESTINES WITH COMPLICATION (HCC): ICD-10-CM

## 2023-04-26 DIAGNOSIS — B49 FUNGAL INFECTION: ICD-10-CM

## 2023-04-26 DIAGNOSIS — R19.7 DIARRHEA, UNSPECIFIED TYPE: ICD-10-CM

## 2023-04-26 DIAGNOSIS — K50.018 CROHN'S DISEASE OF SMALL INTESTINE WITH OTHER COMPLICATION (HCC): ICD-10-CM

## 2023-04-26 DIAGNOSIS — E61.1 LOW IRON: ICD-10-CM

## 2023-04-26 RX ORDER — OMEPRAZOLE 20 MG/1
20 CAPSULE, DELAYED RELEASE ORAL DAILY
COMMUNITY

## 2023-04-26 RX ORDER — NYSTATIN 100000 U/G
CREAM TOPICAL 2 TIMES DAILY
Qty: 30 G | Refills: 1 | Status: SHIPPED | OUTPATIENT
Start: 2023-04-26

## 2023-04-26 NOTE — PROGRESS NOTES
Virtual Regular Visit    Verification of patient location:    Patient is located at Home in the following state in which I hold an active license [place]      Assessment/Plan:    The patient is a 79-year-old woman with smoldering multiple myeloma, inflammatory bowel disease initially thought to be ulcerative colitis status post proctocolectomy with ileal pouch anal anastomosis in 6999 complicated by small bowel obstruction in 2019 with strictures in the pouch at the anastomosis and adhesions status post dilation of the stricture which was complicated by microperforation for which she underwent an exploratory laparotomy, lysis of adhesions, creation of a loop ileostomy and eventually ileostomy reversal in 2019 who presents for follow-up  Previously recommended that she perform manual dilation and that had helped with significant improvement of her symptoms previously  She had started [facility] in September 2020 and she received her first infusion but did not follow-up after that  She had been restarted on Entyvio in November but then had interruption of treatment until February 2021 secondary to insurance issues and ultimately restarted on Entyvio  She had also received iron infusions in the past   December 2021 she has significant diarrhea with some elevation of inflammatory markers but C  difficile negative and colonoscopy at that time showed anal stricture that was dilated as well as inflammation and ulcerations of the pouch, and subsequent CT enterography in February 2022 showed active inflammation of the distal ileum and pouch with strictures  Her Entyvio level was checked and it was low and she was increased to every 4 weeks in March 2022 but that had been delayed and restarted sometime in May  Overall she feels that she has been improving with the stelara and her bowel movements have improved   She does have irritation in the jonny-anal area and is concerned she has a fungal infection there (which she had previously)  CT enterography from November 2022 with active inflammatory small bowel Crohn's disease with luminal narrowing and stricture involving the pouch as well as distal ileum/pouch anastomosis but it did appear that the degree of enhancement in severity was less  There was no penetrating perianal disease  Cholelithiasis had been noted  Last pouchoscopy June 2022 with multiple large superficial irregular ulcers 50 cm from the anal verge and 2 areas of narrowing in the pouch as well as ulcerations in these areas of narrowing  Most recent Stelara level 7 with no antidrug antibodies  Most recent CMP with albumin 3 2, corrected calcium 10 4, otherwise overall normal   Iron saturation 9 with iron of 40 and ferritin of 77  CBC overall normal   Hemoglobin A1c 6 3  CRP 3 5  Problem List Items Addressed This Visit        Digestive    Crohn's disease of small intestine with other complication ([facility] ) - Primary    Gastro-esophageal reflux disease without esophagitis    Relevant Medications    omeprazole (PriLOSEC) 20 mg delayed release capsule       Endocrine    Diabetes mellitus ([facility] )       Respiratory    COPD (chronic obstructive pulmonary disease) ([facility] )       Other    Protein-calorie malnutrition, unspecified severity ([facility] )    Crohn's disease ([facility] )   Other Visit Diagnoses     Fungal infection        Relevant Medications    nystatin (MYCOSTATIN) cream    Immunocompromised patient ([facility] )        Diarrhea, unspecified type        Iron deficiency anemia, unspecified iron deficiency anemia type            Nystatin cream for fungal infection in the anal area  Continue Stelara every 4 weeks    Continue Banatrol  Continue Lomotil as needed  Continue Imodium as needed  Order repeat pouchoscopy in the future  Management of iron as per hematology    Next blood work in 3 to 4 months  Next quant gold and hepatitis panel due January 2024    If medical therapy fails, can consider permanent ileostomy in the future;   This has been discussed with her previously but overall she does not want to do this option  Low residue diet (avoid leafy green vegetables, raw fruits and vegetables especially with skin, nuts, seeds, corn/popcorn, high-fiber foods)  Drink at least 8 cups of water per day  Avoid eating 3 to 4 hours prior to bedtime  Continue to follow-up with colorectal surgery    IBD HCM to be discussed in more detail:  Avoid live virus vaccines  Yearly flu shot  COVID vaccine and booster  Pneumonia vaccine  Shingrix  Routine mammogram  Routine Pap smear  DEXA scan  Routine skin exams with the dermatologist         Reason for visit is   Chief Complaint   Patient presents with   • Follow-up     Crohn's    • Virtual Regular Visit        Encounter provider Giovanni Anguiano MD    Provider located at [address] [id number] [phone]      Recent Visits  Date Type Provider Dept   04/26/23 Telemedicine Giovanni Anguiano MD [facility] [place]   Showing recent visits within past 7 days and meeting all other requirements  Future Appointments  No visits were found meeting these conditions  Showing future appointments within next 150 days and meeting all other requirements       The patient was identified by name and date of birth  Kemalerik Garcia was informed that this is a telemedicine visit and that the visit is being conducted through Telephone  My office door was closed  No one else was in the room  She acknowledged consent and understanding of privacy and security of the video platform  The patient has agreed to participate and understands they can discontinue the visit at any time  Patient is aware this is a billable service       It was my intent to perform this visit via video technology but the patient was not able to do a video connection so the visit was completed via audio telephone only       Subjective  Luh Flores is a 81 y o  female with IBD here for follow-up  She is trying to eat more red meat and spinach  She is eating more and drinking more  She is sometimes tired but she takes her vitamins  Her weight is up and down a little  No blood in her stool  In March she noticed some blood in the anal area with some itching  It was a lot  She used iodine and washed it and it got better, and then it came back  She had this before and was treated for fungal infection and it got better  The BMs have improved to 5-6 per day  It has improved  Less urgency and she can hold it in  When she has gas she has pain  She will go back to liquids and then it is better  It does not happen often but it can happen  She will take yogi detox tea and it helps  She does it some beans      Past Medical History:   Diagnosis Date   • Asthma    • COPD (chronic obstructive pulmonary disease) ([facility] )    • COVID    • Crohn's disease ([facility] )    • Diabetes mellitus ([place] [address] )    • Hypertension    • Multiple myeloma ([place] [address] )    • Ocular hypertension        Past Surgical History:   Procedure Laterality Date   • CATARACT EXTRACTION     • COLON SURGERY      Partial colectomy, partial removal of the rectum   • EXPLORATORY LAPAROTOMY     • ILEOSTOMY     • IR BIOPSY BONE MARROW  10/13/2021       Current Outpatient Medications   Medication Sig Dispense Refill   • albuterol (PROVENTIL HFA,VENTOLIN HFA) 90 mcg/act inhaler Inhale     • Ascorbic Acid (Vitamin C) 500 MG CAPS Take 1 capsule by mouth in the morning     • atorvastatin (LIPITOR) 10 mg tablet Take 10 mg by mouth daily     • Banana Flakes (Banatrol plus) PACK Take 1 each by mouth 2 (two) times a day 75 each 1   • Cyanocobalamin (B-12) 5000 MCG CAPS Take 1 capsule by mouth in the morning     • dextromethorphan-guaiFENesin (ROBITUSSIN DM)  mg/5 mL syrup Take 10 mL by mouth every 4 (four) hours as needed for cough 118 mL 0   • diphenoxylate-atropine (LOMOTIL) 2 5-0 025 mg per tablet Take 1 tablet by mouth 4 (four) times a day as needed for diarrhea 60 tablet 1   • dorzolamide-timolol (COSOPT) 22 3-6 8 MG/ML ophthalmic solution 1 drop 2 times daily- 8 am and 8 pm- both eyes     • ferrous sulfate 325 (65 FE) MG EC tablet Take 325 mg by mouth 3 (three) times a day with meals     • fluticasone (FLONASE) 50 mcg/act nasal spray 1 spray daily     • Fluticasone Furoate-Vilanterol 100-25 mcg/actuation inhaler Inhale 1 puff daily Rinse mouth after use  • hydrochlorothiazide (HYDRODIURIL) 25 mg tablet Take 25 mg by mouth daily     • levothyroxine 125 mcg tablet Take 125 mcg by mouth daily     • lisinopril (ZESTRIL) 5 mg tablet Take 5 mg by mouth daily     • metFORMIN (GLUCOPHAGE) 500 mg tablet Take 500 mg by mouth 2 (two) times a day with meals     • Multiple Vitamins-Minerals (Ocutabs-Lutein) TABS Take 1 tablet by mouth in the morning  0   • nystatin (MYCOSTATIN) cream Apply topically 2 (two) times a day 30 g 1   • omeprazole (PriLOSEC) 20 mg delayed release capsule Take 20 mg by mouth daily     • ustekinumab (Stelara) 90 mg/mL subcutaneous injection Inject 1 mL (90 mg total) under the skin every 28 days 1 mL 11   • Zinc 50 MG CAPS Take by mouth       No current facility-administered medications for this visit  Allergies   Allergen Reactions   • Brimonidine      Red eyes   • Mixed Ragweed Allergic Rhinitis   • Tramadol Nausea Only       Review of systems:  10 point ROS reviewed and negative, except as above    Video Exam    There were no vitals filed for this visit  Unable to perform physical examination given the unavailability of a video application         Visit Time  Total Visit Duration: 20

## 2023-04-26 NOTE — TELEPHONE ENCOUNTER
P/t stated had a virtual visit today and forgo the mention is having a issue with very dry skin and looking for a recommendation from the office   Please call back to discuss

## 2023-06-23 ENCOUNTER — TELEPHONE (OUTPATIENT)
Dept: GASTROENTEROLOGY | Facility: CLINIC | Age: 73
End: 2023-06-23

## 2023-08-25 ENCOUNTER — TELEPHONE (OUTPATIENT)
Dept: GASTROENTEROLOGY | Facility: CLINIC | Age: 73
End: 2023-08-25

## 2023-08-25 NOTE — LETTER
Date: 8/25/2023      Morelia Cardoso  [address] [address] [place]      Dear Morelia Cardoso,      We have attempted to reach you regarding your upcoming appointment on 9/18/2023 with Dr. Latonya Pritchard. The phone number comes up no longer in service. Unfortunately, due to a change in the provider's schedule we need to change your appointment. Please call our office as soon as possible so we can reschedule your appointment. We apologize for any inconvenience this may cause. Thank you in advance for your cooperation and assistance.           Sincerely,    [facility]  [phone]

## 2023-09-22 ENCOUNTER — TELEPHONE (OUTPATIENT)
Dept: HEMATOLOGY ONCOLOGY | Facility: CLINIC | Age: 73
End: 2023-09-22

## 2023-09-22 ENCOUNTER — TELEPHONE (OUTPATIENT)
Dept: GASTROENTEROLOGY | Facility: CLINIC | Age: 73
End: 2023-09-22

## 2023-09-22 ENCOUNTER — TELEMEDICINE (OUTPATIENT)
Dept: GASTROENTEROLOGY | Facility: CLINIC | Age: 73
End: 2023-09-22
Payer: COMMERCIAL

## 2023-09-22 DIAGNOSIS — D50.9 IRON DEFICIENCY ANEMIA, UNSPECIFIED IRON DEFICIENCY ANEMIA TYPE: ICD-10-CM

## 2023-09-22 DIAGNOSIS — D84.9 IMMUNOCOMPROMISED PATIENT (HCC): ICD-10-CM

## 2023-09-22 DIAGNOSIS — R10.84 GENERALIZED ABDOMINAL PAIN: ICD-10-CM

## 2023-09-22 DIAGNOSIS — K50.018 CROHN'S DISEASE OF SMALL INTESTINE WITH OTHER COMPLICATION (HCC): Primary | ICD-10-CM

## 2023-09-22 DIAGNOSIS — E46 PROTEIN-CALORIE MALNUTRITION, UNSPECIFIED SEVERITY (HCC): ICD-10-CM

## 2023-09-22 DIAGNOSIS — E53.8 VITAMIN B12 DEFICIENCY: ICD-10-CM

## 2023-09-22 DIAGNOSIS — R19.7 DIARRHEA, UNSPECIFIED TYPE: ICD-10-CM

## 2023-09-22 DIAGNOSIS — K21.9 GASTRO-ESOPHAGEAL REFLUX DISEASE WITHOUT ESOPHAGITIS: ICD-10-CM

## 2023-09-22 DIAGNOSIS — B49 FUNGAL INFECTION: ICD-10-CM

## 2023-09-22 DIAGNOSIS — Z98.890 STATUS POST REVERSAL OF ILEOSTOMY: ICD-10-CM

## 2023-09-22 PROCEDURE — 99214 OFFICE O/P EST MOD 30 MIN: CPT | Performed by: INTERNAL MEDICINE

## 2023-09-22 RX ORDER — DIPHENOXYLATE HYDROCHLORIDE AND ATROPINE SULFATE 2.5; .025 MG/1; MG/1
1 TABLET ORAL 4 TIMES DAILY PRN
Qty: 60 TABLET | Refills: 1 | Status: SHIPPED | OUTPATIENT
Start: 2023-09-22

## 2023-09-22 RX ORDER — NYSTATIN 100000 U/G
CREAM TOPICAL 2 TIMES DAILY
Qty: 30 G | Refills: 1 | Status: SHIPPED | OUTPATIENT
Start: 2023-09-22

## 2023-09-22 NOTE — TELEPHONE ENCOUNTER
We are fully aware. Her approval end date is 12/24/2023 and we submit for reauthorization approximately 30 days prior.

## 2023-09-22 NOTE — PROGRESS NOTES
Virtual Regular Visit    Verification of patient location:    Patient is located at Home in the following state in which I hold an active license [place]      Assessment/Plan:    The patient is a 74-year-old woman with smoldering multiple myeloma as well as inflammatory bowel disease that was initially thought to be ulcerative colitis status post proctocolectomy with ileal pouch anal anastomosis in 6838 but complicated by small bowel obstruction in 2019 with strictures in the pouch at the anastomosis and adhesions status post dilation of the stricture complicated by microperforation for which she underwent exploratory laparotomy with lysis of adhesions and creation of a loop ileostomy and eventual ileostomy reversal in 2019 thought to possibly have Crohn's disease versus ischemia of the pouch who now presents for follow-up. Was previously recommended that she perform manual dilation and that it helped significant improvement. In the past she had been on Entyvio since September 2020 with some lost to follow-up but ultimately restarted and then again unable to be continued secondary to insurance issues. Overall she feels well and the best she has ever been. CT enterography February 2022 with active inflammation of the distal ileum and pouch with strictures. Her Entyvio at that time had been increased to every 4 weeks. CT enterography from November 2022 with active IBD with small bowel inflammation and luminal narrowing and stricturing involving the pouch as well as the distal ileum and pouch anastomosis. No penetrating perianal disease. The degree of mural hyperenhancement and wall thickening was less severe compared to prior CT enterography. Last pouchoscopy June 2023 with multiple large superficial irregular ulcers 50 cm from anal verge with 2 areas of narrowing in the pouch as well as ulcerations. Recent Stelara level was 7 with no antidrug antibodies.   Most recent CMP with corrected calcium 10.4 and albumin 3.2 but otherwise normal.  Iron studies with low iron at 40 and iron saturation of 9 back in March 2023. CBC normal.  Hemoglobin A1c 6.3. CRP 3.5. IgG 1760. Problem List Items Addressed This Visit        Digestive    Crohn's disease of small intestine with other complication ([address]) - Primary    Gastro-esophageal reflux disease without esophagitis       Other    Protein-calorie malnutrition, unspecified severity ([address])    Status post reversal of ileostomy   Other Visit Diagnoses     Immunocompromised patient ([address])        Diarrhea, unspecified type        Iron deficiency anemia, unspecified iron deficiency anemia type        Generalized abdominal pain              Continue Stelara every 28 days  Next blood work ordered now  Next quant gold and hepatitis panel January 2024 can order today  Next imaging in 9 to 12 months  Next pouchoscopy ordered at next visit    Nystatin cream as needed    Continue Banatrol 2 times daily  Can use Imodium or Lomotil as needed to help with constipation, but would try to limit given prior bowel obstruction  Continue manual dilations as needed    Low residue diet (avoid leafy green vegetables, raw fruits and vegetables especially with skin, nuts, seeds, corn/popcorn, high-fiber foods).    Drink at least 8 cups of water per day  High-protein diet  Avoid eating 3 to 4 hours before bedtime    Continue B12 supplementation  Continue iron supplementation with vitamin C    Continue omeprazole 20 mg daily    Avoid live virus vaccines  Yearly flu shot  COVID vaccine and booster  Pneumonia vaccine  Shingrix  Routine skin exams with the dermatologist  Routine Pap smear  Routine mammogram  Routine follow-up with gynecologist           Reason for visit is   Chief Complaint   Patient presents with   • Follow-up     Crohn's fu   • Medication Refill     Diclofenac sodium 1%% gel   • Virtual Regular Visit        Encounter provider Haseeb Perez MD    Provider located at [facility]  [place] [address] [facility] [id number] [phone]      Recent Visits  No visits were found meeting these conditions. Showing recent visits within past 7 days and meeting all other requirements  Today's Visits  Date Type Provider Dept   09/22/23 Telemedicine MD Mina Buchanan [facility]   Showing today's visits and meeting all other requirements  Future Appointments  No visits were found meeting these conditions. Showing future appointments within next 150 days and meeting all other requirements       The patient was identified by name and date of birth. Lucas Tolentino was informed that this is a telemedicine visit and that the visit is being conducted through Telephone. My office door was closed. No one else was in the room. She acknowledged consent and understanding of privacy and security of the video platform. The patient has agreed to participate and understands they can discontinue the visit at any time. Patient is aware this is a billable service. It was my intent to perform this visit via video technology but the patient was not able to do a video connection so the visit was completed via audio telephone only. Subjective  Lucas Tolentino is a 81 y.o. female with crohn's. She feels well. Appetite is increased. Energy is good. She takes vitamins. BMs good. She can sleep through the night. She has 3-4 BMs per day. Still loose but it does not bother her. She has a little pain from constipation today. She drinks Koby. No blood in the stool. She thinks she is gaining weight and her clothes fit better.      Past Medical History:   Diagnosis Date   • Asthma    • COPD (chronic obstructive pulmonary disease) ([address])    • COVID    • Crohn's disease ([address])    • Diabetes mellitus ([address])    • Hypertension    • Multiple myeloma ([address])    • Ocular hypertension        Past Surgical History: Procedure Laterality Date   • CATARACT EXTRACTION     • COLON SURGERY      Partial colectomy, partial removal of the rectum   • EXPLORATORY LAPAROTOMY     • ILEOSTOMY     • IR BIOPSY BONE MARROW  10/13/2021       Current Outpatient Medications   Medication Sig Dispense Refill   • albuterol (PROVENTIL HFA,VENTOLIN HFA) 90 mcg/act inhaler Inhale     • Ascorbic Acid (Vitamin C) 500 MG CAPS Take 1 capsule by mouth in the morning     • atorvastatin (LIPITOR) 10 mg tablet Take 10 mg by mouth daily     • Banana Flakes (Banatrol plus) PACK Take 1 each by mouth 2 (two) times a day 75 each 1   • Cyanocobalamin (B-12) 5000 MCG CAPS Take 1 capsule by mouth in the morning     • dextromethorphan-guaiFENesin (ROBITUSSIN DM)  mg/5 mL syrup Take 10 mL by mouth every 4 (four) hours as needed for cough 118 mL 0   • diphenoxylate-atropine (LOMOTIL) 2.5-0.025 mg per tablet Take 1 tablet by mouth 4 (four) times a day as needed for diarrhea 60 tablet 1   • dorzolamide-timolol (COSOPT) 22.3-6.8 MG/ML ophthalmic solution 1 drop 2 times daily- 8 am and 8 pm- both eyes     • ferrous sulfate 325 (65 FE) MG EC tablet Take 325 mg by mouth 3 (three) times a day with meals     • fluticasone (FLONASE) 50 mcg/act nasal spray 1 spray daily     • Fluticasone Furoate-Vilanterol 100-25 mcg/actuation inhaler Inhale 1 puff daily Rinse mouth after use.      • hydrochlorothiazide (HYDRODIURIL) 25 mg tablet Take 25 mg by mouth daily     • levothyroxine 125 mcg tablet Take 125 mcg by mouth daily     • lisinopril (ZESTRIL) 5 mg tablet Take 5 mg by mouth daily     • metFORMIN (GLUCOPHAGE) 500 mg tablet Take 500 mg by mouth 2 (two) times a day with meals     • Multiple Vitamins-Minerals (Ocutabs-Lutein) TABS Take 1 tablet by mouth in the morning  0   • nystatin (MYCOSTATIN) cream Apply topically 2 (two) times a day 30 g 1   • omeprazole (PriLOSEC) 20 mg delayed release capsule Take 20 mg by mouth daily     • ustekinumab (Stelara) 90 mg/mL subcutaneous injection Inject 1 mL (90 mg total) under the skin every 28 days 1 mL 11   • Zinc 50 MG CAPS Take by mouth       No current facility-administered medications for this visit. Allergies   Allergen Reactions   • Brimonidine      Red eyes   • Mixed Ragweed Allergic Rhinitis   • Tramadol Nausea Only       REVIEW OF SYSTEMS:  10 point ROS reviewed and negative, except as above      PHYSICAL EXAMINATION:  Unable to perform physical examination given the unavailability of a video application.      Visit Time  Total Visit Duration: 15

## 2023-09-22 NOTE — TELEPHONE ENCOUNTER
KSENIA  DR johnny CHOI   Who are you speaking with? Patient   If it is not the patient, are they listed on an active communication consent form? N/A   Is this a KSENIA or DR johnny CHOI KSENIA   Which provider is patient currently scheduled or established with? Tali Ortiz   What is the original appointment date and time? 10/26/23 10:00am   At which location is the appointment scheduled to take place? [facility]   Which provider is the patient transitioning care to? Dr. Leslee Hayden   What is the new appointment date and time? 10/26/23 9:00am   At which location is the new appointment scheduled to take place? [facility]   What is the reason for this change? Change in provider's schedule       Patient needs [facility] set up for this appointment. [facility] has used [facility] in [facility] past. Patient would like a call back to know when [facility] will pick her up on the day of her appointment.  [phone]

## 2023-09-22 NOTE — TELEPHONE ENCOUNTER
Leon Benedicty me that she is concerned her Stelara authorization will run out in December. Just wanted to send a message in case for some reason you were not aware, but I know that you are always on top of it. Thank you!

## 2023-09-28 ENCOUNTER — TELEPHONE (OUTPATIENT)
Age: 73
End: 2023-09-28

## 2023-09-28 DIAGNOSIS — R19.7 DIARRHEA, UNSPECIFIED TYPE: Primary | ICD-10-CM

## 2023-09-28 NOTE — TELEPHONE ENCOUNTER
Patients GI provider:  Dr. Abram Reyes    Number to return call: [phone]    Reason for call: Pt called in stating that the [facility] mail order doesn't carry the med sent over to them for the diarrhea. Pt stated she did not know which one of the meds for the diarrhea they do not carry. Asked pt which pharmacy should we send it over to and pt stated she didn't know. The pharmacy told her that Dr. Abram Reyes should know where to send it to.      Scheduled procedure/appointment date if applicable: Apt/procedure NA

## 2023-09-28 NOTE — TELEPHONE ENCOUNTER
Called and Spoke with the pt regarding the medication Banana Flakes (Banatrol Pack)for Diarrhea the Insurance not carry this medication. Explained to the PT. Called the Insurance spoke the ref name Alecia Blanco give the lists the medication cover by Insurance.

## 2023-10-02 RX ORDER — LOPERAMIDE HYDROCHLORIDE 2 MG/1
4 CAPSULE ORAL 4 TIMES DAILY PRN
Qty: 240 CAPSULE | Refills: 3 | Status: SHIPPED | OUTPATIENT
Start: 2023-10-02 | End: 2023-10-03

## 2023-10-02 NOTE — TELEPHONE ENCOUNTER
Called and Spoke with the  patient she okay change to Loperamide and send the scripts to [facility].

## 2023-10-03 ENCOUNTER — APPOINTMENT (OUTPATIENT)
Dept: LAB | Facility: HOSPITAL | Age: 73
End: 2023-10-03
Payer: COMMERCIAL

## 2023-10-03 DIAGNOSIS — E11.9 DIABETES MELLITUS WITHOUT COMPLICATION (HCC): ICD-10-CM

## 2023-10-03 DIAGNOSIS — H40.1131 PRIMARY OPEN ANGLE GLAUCOMA OF BOTH EYES, MILD STAGE: ICD-10-CM

## 2023-10-03 DIAGNOSIS — K50.018 CROHN'S DISEASE OF SMALL INTESTINE WITH OTHER COMPLICATION (HCC): ICD-10-CM

## 2023-10-03 DIAGNOSIS — K21.9 GASTRO-ESOPHAGEAL REFLUX DISEASE WITHOUT ESOPHAGITIS: ICD-10-CM

## 2023-10-03 DIAGNOSIS — E53.8 VITAMIN B12 DEFICIENCY: ICD-10-CM

## 2023-10-03 DIAGNOSIS — D84.9 IMMUNOCOMPROMISED PATIENT (HCC): ICD-10-CM

## 2023-10-03 DIAGNOSIS — E46 PROTEIN-CALORIE MALNUTRITION, UNSPECIFIED SEVERITY (HCC): ICD-10-CM

## 2023-10-03 DIAGNOSIS — R19.7 DIARRHEA, UNSPECIFIED TYPE: ICD-10-CM

## 2023-10-03 DIAGNOSIS — I10 HYPERTENSION GOAL BP (BLOOD PRESSURE) < 130/80: ICD-10-CM

## 2023-10-03 DIAGNOSIS — D50.9 IRON DEFICIENCY ANEMIA, UNSPECIFIED IRON DEFICIENCY ANEMIA TYPE: ICD-10-CM

## 2023-10-03 DIAGNOSIS — E03.9 HYPOTHYROIDISM, UNSPECIFIED TYPE: ICD-10-CM

## 2023-10-03 LAB
ALBUMIN SERPL BCP-MCNC: 4.1 G/DL (ref 3.5–5)
ALBUMIN SERPL BCP-MCNC: 4.1 G/DL (ref 3.5–5)
ALP SERPL-CCNC: 96 U/L (ref 34–104)
ALP SERPL-CCNC: 99 U/L (ref 34–104)
ALT SERPL W P-5'-P-CCNC: 33 U/L (ref 7–52)
ALT SERPL W P-5'-P-CCNC: 34 U/L (ref 7–52)
ANION GAP SERPL CALCULATED.3IONS-SCNC: 6 MMOL/L
ANION GAP SERPL CALCULATED.3IONS-SCNC: 7 MMOL/L
AST SERPL W P-5'-P-CCNC: 25 U/L (ref 13–39)
AST SERPL W P-5'-P-CCNC: 28 U/L (ref 13–39)
BASOPHILS # BLD AUTO: 0.04 THOUSANDS/ÂΜL (ref 0–0.1)
BASOPHILS NFR BLD AUTO: 1 % (ref 0–1)
BILIRUB SERPL-MCNC: 0.73 MG/DL (ref 0.2–1)
BILIRUB SERPL-MCNC: 0.76 MG/DL (ref 0.2–1)
BUN SERPL-MCNC: 19 MG/DL (ref 5–25)
BUN SERPL-MCNC: 19 MG/DL (ref 5–25)
CALCIUM SERPL-MCNC: 9.8 MG/DL (ref 8.4–10.2)
CALCIUM SERPL-MCNC: 9.9 MG/DL (ref 8.4–10.2)
CHLORIDE SERPL-SCNC: 96 MMOL/L (ref 96–108)
CHLORIDE SERPL-SCNC: 97 MMOL/L (ref 96–108)
CHOLEST SERPL-MCNC: 174 MG/DL
CO2 SERPL-SCNC: 30 MMOL/L (ref 21–32)
CO2 SERPL-SCNC: 31 MMOL/L (ref 21–32)
CREAT SERPL-MCNC: 0.79 MG/DL (ref 0.6–1.3)
CREAT SERPL-MCNC: 0.85 MG/DL (ref 0.6–1.3)
CRP SERPL QL: 2.7 MG/L
EOSINOPHIL # BLD AUTO: 0.05 THOUSAND/ÂΜL (ref 0–0.61)
EOSINOPHIL NFR BLD AUTO: 1 % (ref 0–6)
ERYTHROCYTE [DISTWIDTH] IN BLOOD BY AUTOMATED COUNT: 14.4 % (ref 11.6–15.1)
EST. AVERAGE GLUCOSE BLD GHB EST-MCNC: 157 MG/DL
FERRITIN SERPL-MCNC: 39 NG/ML (ref 11–307)
GFR SERPL CREATININE-BSD FRML MDRD: 69 ML/MIN/1.73SQ M
GFR SERPL CREATININE-BSD FRML MDRD: 76 ML/MIN/1.73SQ M
GLUCOSE P FAST SERPL-MCNC: 104 MG/DL (ref 65–99)
GLUCOSE P FAST SERPL-MCNC: 112 MG/DL (ref 65–99)
HBA1C MFR BLD: 7.1 %
HBV CORE AB SER QL: NORMAL
HBV CORE IGM SER QL: NORMAL
HBV SURFACE AG SER QL: NORMAL
HCT VFR BLD AUTO: 43.1 % (ref 34.8–46.1)
HCV AB SER QL: NORMAL
HDLC SERPL-MCNC: 117 MG/DL
HGB BLD-MCNC: 13.6 G/DL (ref 11.5–15.4)
IMM GRANULOCYTES # BLD AUTO: 0.03 THOUSAND/UL (ref 0–0.2)
IMM GRANULOCYTES NFR BLD AUTO: 1 % (ref 0–2)
IRON SATN MFR SERPL: 13 % (ref 15–50)
IRON SERPL-MCNC: 56 UG/DL (ref 50–212)
LDLC SERPL CALC-MCNC: 38 MG/DL (ref 0–100)
LYMPHOCYTES # BLD AUTO: 2.22 THOUSANDS/ÂΜL (ref 0.6–4.47)
LYMPHOCYTES NFR BLD AUTO: 48 % (ref 14–44)
MCH RBC QN AUTO: 27.6 PG (ref 26.8–34.3)
MCHC RBC AUTO-ENTMCNC: 31.6 G/DL (ref 31.4–37.4)
MCV RBC AUTO: 87 FL (ref 82–98)
MONOCYTES # BLD AUTO: 0.48 THOUSAND/ÂΜL (ref 0.17–1.22)
MONOCYTES NFR BLD AUTO: 10 % (ref 4–12)
NEUTROPHILS # BLD AUTO: 1.83 THOUSANDS/ÂΜL (ref 1.85–7.62)
NEUTS SEG NFR BLD AUTO: 39 % (ref 43–75)
NONHDLC SERPL-MCNC: 57 MG/DL
NRBC BLD AUTO-RTO: 0 /100 WBCS
PLATELET # BLD AUTO: 274 THOUSANDS/UL (ref 149–390)
PMV BLD AUTO: 12.3 FL (ref 8.9–12.7)
POTASSIUM SERPL-SCNC: 4.1 MMOL/L (ref 3.5–5.3)
POTASSIUM SERPL-SCNC: 4.5 MMOL/L (ref 3.5–5.3)
PROT SERPL-MCNC: 8.3 G/DL (ref 6.4–8.4)
PROT SERPL-MCNC: 8.5 G/DL (ref 6.4–8.4)
RBC # BLD AUTO: 4.93 MILLION/UL (ref 3.81–5.12)
SODIUM SERPL-SCNC: 133 MMOL/L (ref 135–147)
SODIUM SERPL-SCNC: 134 MMOL/L (ref 135–147)
TIBC SERPL-MCNC: 447 UG/DL (ref 250–450)
TRIGL SERPL-MCNC: 96 MG/DL
TSH SERPL DL<=0.05 MIU/L-ACNC: 0.34 UIU/ML (ref 0.45–4.5)
UIBC SERPL-MCNC: 391 UG/DL (ref 155–355)
VIT B12 SERPL-MCNC: 920 PG/ML (ref 180–914)
WBC # BLD AUTO: 4.65 THOUSAND/UL (ref 4.31–10.16)

## 2023-10-03 PROCEDURE — 86705 HEP B CORE ANTIBODY IGM: CPT

## 2023-10-03 PROCEDURE — 86803 HEPATITIS C AB TEST: CPT

## 2023-10-03 PROCEDURE — 86480 TB TEST CELL IMMUN MEASURE: CPT

## 2023-10-03 PROCEDURE — 82728 ASSAY OF FERRITIN: CPT

## 2023-10-03 PROCEDURE — 80061 LIPID PANEL: CPT

## 2023-10-03 PROCEDURE — 86704 HEP B CORE ANTIBODY TOTAL: CPT

## 2023-10-03 PROCEDURE — 86140 C-REACTIVE PROTEIN: CPT

## 2023-10-03 PROCEDURE — 80053 COMPREHEN METABOLIC PANEL: CPT

## 2023-10-03 PROCEDURE — 83550 IRON BINDING TEST: CPT

## 2023-10-03 PROCEDURE — 83036 HEMOGLOBIN GLYCOSYLATED A1C: CPT

## 2023-10-03 PROCEDURE — 82607 VITAMIN B-12: CPT

## 2023-10-03 PROCEDURE — 85025 COMPLETE CBC W/AUTO DIFF WBC: CPT

## 2023-10-03 PROCEDURE — 36415 COLL VENOUS BLD VENIPUNCTURE: CPT

## 2023-10-03 PROCEDURE — 87340 HEPATITIS B SURFACE AG IA: CPT

## 2023-10-03 PROCEDURE — 84443 ASSAY THYROID STIM HORMONE: CPT

## 2023-10-03 PROCEDURE — 83540 ASSAY OF IRON: CPT

## 2023-10-03 RX ORDER — LOPERAMIDE HYDROCHLORIDE 2 MG/1
4 CAPSULE ORAL 4 TIMES DAILY PRN
Qty: 720 CAPSULE | Refills: 3 | Status: SHIPPED | OUTPATIENT
Start: 2023-10-03

## 2023-10-03 NOTE — TELEPHONE ENCOUNTER
Ra Lopez from [facility] Insurance order pharmacy called to request a quantity change for the loperamide. She needs a quantity of 720 sent.

## 2023-10-04 ENCOUNTER — TELEPHONE (OUTPATIENT)
Dept: GASTROENTEROLOGY | Facility: CLINIC | Age: 73
End: 2023-10-04

## 2023-10-04 ENCOUNTER — TELEPHONE (OUTPATIENT)
Age: 73
End: 2023-10-04

## 2023-10-04 DIAGNOSIS — K50.018 CROHN'S DISEASE OF SMALL INTESTINE WITH OTHER COMPLICATION (HCC): ICD-10-CM

## 2023-10-04 DIAGNOSIS — R76.12 POSITIVE QUANTIFERON-TB GOLD TEST: Primary | ICD-10-CM

## 2023-10-04 LAB
GAMMA INTERFERON BACKGROUND BLD IA-ACNC: 0.04 IU/ML
M TB IFN-G BLD-IMP: POSITIVE
M TB IFN-G CD4+ BCKGRND COR BLD-ACNC: 0.09 IU/ML
M TB IFN-G CD4+ BCKGRND COR BLD-ACNC: 0.99 IU/ML
MITOGEN IGNF BCKGRD COR BLD-ACNC: 3.8 IU/ML

## 2023-10-04 NOTE — TELEPHONE ENCOUNTER
Yahaira Quezada from [facility] called in about pts positive TB lab work. She asks that pt is sent for chest xray asap.      Any further questions, can call [phone] No difficulties

## 2023-10-09 ENCOUNTER — NURSE TRIAGE (OUTPATIENT)
Age: 73
End: 2023-10-09

## 2023-10-09 NOTE — TELEPHONE ENCOUNTER
----- Message from Peri Olivera sent at 10/9/2023 10:29 AM EDT -----  Pt called to ask if she should take the [address] [place] [address] Road or wait until you get the chest xray results

## 2023-10-09 NOTE — TELEPHONE ENCOUNTER
I spoke with patient and advised to hold the [facility] until chest xray resulted.  I advised she go today but she states she will report on Wednesday for test.

## 2023-10-12 ENCOUNTER — TELEPHONE (OUTPATIENT)
Dept: GASTROENTEROLOGY | Facility: CLINIC | Age: 73
End: 2023-10-12

## 2023-10-12 DIAGNOSIS — R76.12 POSITIVE QUANTIFERON-TB GOLD TEST: Primary | ICD-10-CM

## 2023-10-12 NOTE — TELEPHONE ENCOUNTER
Called patient, reached voicemail, left message to call back to relay provider's message after xray results.

## 2023-10-12 NOTE — TELEPHONE ENCOUNTER
Pt called in returning the results call. Pt asking for a call back to review. Eucrisa Counseling: Patient may experience a mild burning sensation during topical application. Eucrisa is not approved in children less than 2 years of age.

## 2023-10-20 ENCOUNTER — TELEPHONE (OUTPATIENT)
Dept: INFECTIOUS DISEASES | Facility: CLINIC | Age: 73
End: 2023-10-20

## 2023-10-20 NOTE — TELEPHONE ENCOUNTER
Contacted patient to schedule consult. [facility] She states she is OK to wait until end of November for appointment, because she does have a lot of other appointments. She was provided instructions on how to get there, and the address.

## 2023-10-23 ENCOUNTER — TELEPHONE (OUTPATIENT)
Dept: HEMATOLOGY ONCOLOGY | Facility: CLINIC | Age: 73
End: 2023-10-23

## 2023-10-23 NOTE — TELEPHONE ENCOUNTER
Patient calling to see if she has transportation set up for her appointment on 10/26/23 at 9:00am.  I informed patient she does have transportation set up, patient verbalized her understanding.

## 2023-10-26 ENCOUNTER — OFFICE VISIT (OUTPATIENT)
Dept: HEMATOLOGY ONCOLOGY | Facility: CLINIC | Age: 73
End: 2023-10-26
Payer: COMMERCIAL

## 2023-10-26 VITALS
RESPIRATION RATE: 16 BRPM | HEIGHT: 62 IN | HEART RATE: 78 BPM | BODY MASS INDEX: 21.35 KG/M2 | SYSTOLIC BLOOD PRESSURE: 112 MMHG | WEIGHT: 116 LBS | OXYGEN SATURATION: 96 % | TEMPERATURE: 98.1 F | DIASTOLIC BLOOD PRESSURE: 64 MMHG

## 2023-10-26 DIAGNOSIS — D47.2 SMOLDERING MULTIPLE MYELOMA (SMM): Primary | ICD-10-CM

## 2023-10-26 DIAGNOSIS — C90.00 MULTIPLE MYELOMA NOT HAVING ACHIEVED REMISSION (HCC): ICD-10-CM

## 2023-10-26 DIAGNOSIS — E46 PROTEIN-CALORIE MALNUTRITION, UNSPECIFIED SEVERITY (HCC): ICD-10-CM

## 2023-10-26 PROCEDURE — 99204 OFFICE O/P NEW MOD 45 MIN: CPT | Performed by: INTERNAL MEDICINE

## 2023-10-26 NOTE — PROGRESS NOTES
Kat Buenrostro  1950  [facility] HEMATOLOGY ONCOLOGY SPECIALISTS [facility] [id number]    CHIEF COMPLAINT:  Patient with history of SMM. Denies  bone pains, fatigue or lethargy. Feels well. HISTORY OF PRESENT ILLNESS:      1. Smoldering multiple myeloma (SMM)  This is a 53-year-old female who presented to our clinic 08/2021 for iron deficiency anemia. also   SPEP M spike was found IgG lambda at 1.03. Bone survey showed no bone lesions in 8/2021. A bone marrow biopsy was obtained showing smoldering multiple myeloma plasma cell neoplasm 15%. Bone marrow aspirate:   Megakaryopoiesis: Normal maturation   Erythropoiesis: Normal maturation        Iron Content (aspirate): No ring sideroblast identified  Granulopoiesis: Normal maturation   Lymphocytes: Normal number and morphology   Plasma cells: Present, most with mature cytology      Biopsy and clot section:   Cellularity: 40%, normocellular for age  Iron content (biopsy and clot section): Adequate  Reticulin stain: No reticulin fibrosis (0 of 3 by the  Consensus grading scheme)     Immunohistochemical stains and JOY were performed with appropriate controls and show the following results:  -   highlights scattered plasma cells, which appear 15% Lambda restricted plasma cells, negative for Kappa. -  Scattered CD20 positive B lymphocytes and CD3 positive T lymphocytes She is currently on observation. She had a CT myeloma scan 11/2022 which was unrevealing.           Patient Active Problem List   Diagnosis    Asthma    COPD (chronic obstructive pulmonary disease) ([address])    Diabetes mellitus ([address])    Ocular hypertension    Crohn's disease of small intestine with other complication ([address])    Low iron    Protein-calorie malnutrition, unspecified severity (HCC)    Gastro-esophageal reflux disease without esophagitis    Hypertension goal BP (blood pressure) < 140/90    Hypothyroid    MGUS (monoclonal gammopathy of unknown significance)    Primary open angle glaucoma of both eyes, moderate stage    Status post reversal of ileostomy    Crohn's disease ([address] )    COVID-19     Past Medical History:   Diagnosis Date    Asthma     COPD (chronic obstructive pulmonary disease) ([facility])     COVID     Crohn's disease ([address])     Diabetes mellitus ([address])     Hypertension     Multiple myeloma ([facility])     Ocular hypertension      Oncology History    No history exists. Past Surgical History:   Procedure Laterality Date    CATARACT EXTRACTION      COLON SURGERY      Partial colectomy, partial removal of the rectum    EXPLORATORY LAPAROTOMY      ILEOSTOMY      IR BIOPSY BONE MARROW  10/13/2021     Family History   Problem Relation Age of Onset    Diabetes Mother     Hypertension Mother      Social History     Socioeconomic History    Marital status: Unknown     Spouse name: Not on file    Number of children: Not on file    Years of education: Not on file    Highest education level: Not on file   Occupational History    Not on file   Tobacco Use    Smoking status: Never    Smokeless tobacco: Never   Vaping Use    Vaping Use: Never used   Substance and Sexual Activity    Alcohol use: Never    Drug use: Never    Sexual activity: Not Currently   Other Topics Concern    Not on file   Social History Narrative    Not on file     Social Determinants of Health     Financial Resource Strain: Not on file   Food Insecurity: No Food Insecurity (11/25/2022)    Hunger Vital Sign     Worried About Running Out of Food in the Last Year: Never true     Ran Out of Food in the Last Year: Never true   Transportation Needs: No Transportation Needs (11/25/2022)    PRAPARE - Transportation     Lack of Transportation (Medical): No     Lack of Transportation (Non-Medical):  No   Physical Activity: Not on file   Stress: Not on file   Social Connections: Not on file   Intimate Partner Violence: Not on file   Housing Stability: Low Risk  (11/25/2022) Housing Stability Vital Sign     Unable to Pay for Housing in the Last Year: No     Number of Places Lived in the Last Year: 1     Unstable Housing in the Last Year: No       Allergies   Allergen Reactions    Brimonidine      Red eyes    Mixed Ragweed Allergic Rhinitis    Tramadol Nausea Only           Meds:    Current Outpatient Medications:     albuterol (PROVENTIL HFA,VENTOLIN HFA) 90 mcg/act inhaler, Inhale, Disp: , Rfl:     Ascorbic Acid (Vitamin C) 500 MG CAPS, Take 1 capsule by mouth in the morning, Disp: , Rfl:     atorvastatin (LIPITOR) 10 mg tablet, Take 10 mg by mouth daily, Disp: , Rfl:     Banana Flakes (Banatrol plus) PACK, Take 1 each by mouth 2 (two) times a day, Disp: 75 each, Rfl: 1    Cyanocobalamin (B-12) 5000 MCG CAPS, Take 1 capsule by mouth in the morning, Disp: , Rfl:     dextromethorphan-guaiFENesin (ROBITUSSIN DM)  mg/5 mL syrup, Take 10 mL by mouth every 4 (four) hours as needed for cough, Disp: 118 mL, Rfl: 0    diphenoxylate-atropine (LOMOTIL) 2.5-0.025 mg per tablet, Take 1 tablet by mouth 4 (four) times a day as needed for diarrhea, Disp: 60 tablet, Rfl: 1    dorzolamide-timolol (COSOPT) 22.3-6.8 MG/ML ophthalmic solution, 1 drop 2 times daily- 8 am and 8 pm- both eyes, Disp: , Rfl:     ferrous sulfate 325 (65 FE) MG EC tablet, Take 325 mg by mouth 3 (three) times a day with meals, Disp: , Rfl:     fluticasone (FLONASE) 50 mcg/act nasal spray, 1 spray daily, Disp: , Rfl:     Fluticasone Furoate-Vilanterol 100-25 mcg/actuation inhaler, Inhale 1 puff daily Rinse mouth after use., Disp: , Rfl:     hydrochlorothiazide (HYDRODIURIL) 25 mg tablet, Take 25 mg by mouth daily, Disp: , Rfl:     levothyroxine 125 mcg tablet, Take 125 mcg by mouth daily, Disp: , Rfl:     lisinopril (ZESTRIL) 5 mg tablet, Take 5 mg by mouth daily, Disp: , Rfl:     loperamide (IMODIUM) 2 mg capsule, Take 2 capsules (4 mg total) by mouth 4 (four) times a day as needed for diarrhea, Disp: 720 capsule, Rfl: 3    metFORMIN (GLUCOPHAGE) 500 mg tablet, Take 500 mg by mouth 2 (two) times a day with meals, Disp: , Rfl:     Multiple Vitamins-Minerals (Ocutabs-Lutein) TABS, Take 1 tablet by mouth in the morning, Disp: , Rfl: 0    nystatin (MYCOSTATIN) cream, Apply topically 2 (two) times a day, Disp: 30 g, Rfl: 1    omeprazole (PriLOSEC) 20 mg delayed release capsule, Take 20 mg by mouth daily, Disp: , Rfl:     ustekinumab (Stelara) 90 mg/mL subcutaneous injection, Inject 1 mL (90 mg total) under the skin every 28 days, Disp: 1 mL, Rfl: 11    Zinc 50 MG CAPS, Take by mouth, Disp: , Rfl:          REVIEW OF SYSTEMS:  Constitutional:  Denies any fever; denies night sweats; denies weight loss. HEENT:  Denies blurred vission; denies eue drainage; denies bulging eyes; denies hearing impairment; denies tinnitis; denies vertigo; denies sore-throat; denies sinues drainage or post nasal drip. Neck:  Denies neck swelling. Respiratory:  Denies cough; denises coughing up blood; denies chest pains on breathing; denies shortness of breath. Cardiovascular:  Denies chest pains on exertion; denies heart palpitations; denies light headedness or feeling of fainting; denies leg swelling; denies pains in calves; denies pain in legs on walking. GI:  Denies difficulty swallowing; denies heartburn; denies vomiting blood; denies black-colored stools; denies nausea; denies vomiting; denies abdominal paindenies passage of bright red blood. :  Denies blood in urine; denies urinary frequency; denies pain on urination; denies difficulty in passing urine. Spine:  Denies neck pain; denies radiation of pain into arms or legs; denies lower back pain. Hematology:  Denies easy bruising. Lymphalics:  Denies new lumps anywhere  Neurological:  Denies headaches; denies dizziness; denies numbness in extremities; denies weakness in extremities; denies poor balance or disequilibrium. Dermatologic:  denies rash; denies itching.       PHYSICAL EXAM:  /64 Pulse 78   Temp 98.1 °F (36.7 °C) (Temporal)   Resp 16   Ht 5' 2" (1.575 m)   Wt 52.6 kg (116 lb)   SpO2 96%   BMI 21.22 kg/m²      General:  Patient alert; oriented. HEENT:  PERRL; EOM intact; conjunctiva normal; no scleral icterus. Neck:  Trachea midline; thyroid not enlarged; No carotid bruits  Lymphatics:  No submandibular adenopathy; no cervical adenopathy; no supraclavicular adenopathy; no axillary adenopathy; no inguinal adenopathy. Heart:  Regular rhythm; S1 and S2 normal; No murmurs; no rubs; no gallops; peripheral pulses normal and equal bilaterally. Lungs:  Clear to ausculation and percussion  Abdomen:  Soft; non-tender; no masses; no organomegaly; no superficial veins; no hernia; no abdominal bruise. Extremities:  No leg swelling; no calf tenderness  Spine:  No gross spinal deformity; no spinal tenderness; no CVA tenderness. Neurological: patient alert and oriented; crainials II - XII intact; no motor deficit; no sensory deficit; Gait normal.  Psychiatric:  Mood is appropriate, affect is normal, no active hallucinations or delusions.       Labs:   Latest Reference Range & Units 10/03/23 09:50   CO2 21 - 32 mmol/L  21 - 32 mmol/L 31  30   Anion Gap mmol/L  mmol/L 6  7   BUN 5 - 25 mg/dL  5 - 25 mg/dL 19  19   Creatinine 0.60 - 1.30 mg/dL  0.60 - 1.30 mg/dL 0.85  0.79   GLUCOSE FASTING 65 - 99 mg/dL  65 - 99 mg/dL 112 (H)  104 (H)   Calcium 8.4 - 10.2 mg/dL  8.4 - 10.2 mg/dL 9.8  9.9   AST 13 - 39 U/L  13 - 39 U/L 28  25   ALT 7 - 52 U/L  7 - 52 U/L 33  34   Alkaline Phosphatase 34 - 104 U/L  34 - 104 U/L 96  99   Total Protein 6.4 - 8.4 g/dL  6.4 - 8.4 g/dL 8.3  8.5 (H)   Albumin 3.5 - 5.0 g/dL  3.5 - 5.0 g/dL 4.1  4.1   TOTAL BILIRUBIN 0.20 - 1.00 mg/dL  0.20 - 1.00 mg/dL 0.73  0.76   eGFR ml/min/1.73sq m  ml/min/1.73sq m 69  76      Latest Reference Range & Units 10/03/23 09:50   WBC 4.31 - 10.16 Thousand/uL 4.65   Red Blood Cell Count 3.81 - 5.12 Million/uL 4.93   Hemoglobin 11.5 - 15.4 g/dL 13.6   HCT 34.8 - 46.1 % 43.1   MCV 82 - 98 fL 87   MCH 26.8 - 34.3 pg 27.6   MCHC 31.4 - 37.4 g/dL 31.6   RDW 11.6 - 15.1 % 14.4   Platelet Count 211 - 390 Thousands/uL 274   MPV 8.9 - 12.7 fL 12.3   nRBC /100 WBCs 0   Neutrophils % 43 - 75 % 39 (L)   Immat GRANS % 0 - 2 % 1   Lymphocytes Relative 14 - 44 % 48 (H)   Monocytes Relative 4 - 12 % 10   Eosinophils 0 - 6 % 1   Basophils Relative 0 - 1 % 1   Immature Grans Absolute 0.00 - 0.20 Thousand/uL 0.03   Absolute Neutrophils 1.85 - 7.62 Thousands/µL 1.83 (L)   Lymphocytes Absolute 0.60 - 4.47 Thousands/µL 2.22   Absolute Monocytes 0.17 - 1.22 Thousand/µL 0.48   Absolute Eosinophils 0.00 - 0.61 Thousand/µL 0.05   Basophils Absolute 0.00 - 0.10 Thousands/µL 0.04     Assessment/Plan:   81year old female with hx of SMM. No complaints. Exam unremarkable. Labs stable. Will see again in 3 months, with labs, including quantitation of immunoglobs, and serum free light chains.

## 2023-11-29 DIAGNOSIS — K50.018 CROHN'S DISEASE OF SMALL INTESTINE WITH OTHER COMPLICATION (HCC): ICD-10-CM

## 2023-11-29 RX ORDER — USTEKINUMAB 90 MG/ML
INJECTION, SOLUTION SUBCUTANEOUS
Qty: 90 ML | Refills: 5 | Status: SHIPPED | OUTPATIENT
Start: 2023-11-29

## 2023-11-30 ENCOUNTER — CONSULT (OUTPATIENT)
Dept: INFECTIOUS DISEASES | Facility: CLINIC | Age: 73
End: 2023-11-30
Payer: COMMERCIAL

## 2023-11-30 VITALS
OXYGEN SATURATION: 96 % | RESPIRATION RATE: 15 BRPM | SYSTOLIC BLOOD PRESSURE: 130 MMHG | TEMPERATURE: 97.8 F | HEART RATE: 67 BPM | DIASTOLIC BLOOD PRESSURE: 66 MMHG

## 2023-11-30 DIAGNOSIS — R76.12 POSITIVE QUANTIFERON-TB GOLD TEST: ICD-10-CM

## 2023-11-30 DIAGNOSIS — D84.9 IMMUNODEFICIENCY (HCC): Primary | ICD-10-CM

## 2023-11-30 PROCEDURE — 99204 OFFICE O/P NEW MOD 45 MIN: CPT | Performed by: INTERNAL MEDICINE

## 2023-11-30 RX ORDER — RIFAMPIN 300 MG/1
300 CAPSULE ORAL EVERY 12 HOURS SCHEDULED
Qty: 60 CAPSULE | Refills: 1 | Status: SHIPPED | OUTPATIENT
Start: 2023-11-30 | End: 2023-12-30

## 2023-11-30 NOTE — PATIENT INSTRUCTIONS
We are going to start treatment for latent TB  You will take Rifampin 600 mg once daily for a total of 4 months  It is best to take this pill together with your Atorvastatin (cholesterol pill) and on an empty stomach. If yo have too much nausea, you can take it with some crackers or apple sauce. While you are on the Rifampin, we will stop Omeprazole temporarily  We will plan to see you again in 4 weeks for follow up  Please call us if you are worried about any side effects (fever, rash, abdominal pain, fatigue).

## 2023-11-30 NOTE — PROGRESS NOTES
Consultation - Infectious Disease   Moy Washburn 72 y.o. 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 She has had multiple negative tests in the past, including last in January of 2023. She has no current major signs or symptoms of active TB and recent CXR showed no disease in the lungs. We discussed the possibility this could be a false positive result and that we could repeat testing with a T-SPOT. We also discussed initiating therapy now, and possible risk of conversion to active disease if this is a true positive given her underlying immunosuppression and use of a biologic agent. At this time she has elected to start therapy. We have decided on Rifampin. We discussed risks of this medication including possible hepatoxicity, drug interactions, discoloration of body fluids. She has no major liver disease.  -Start PO Rifampin 600 mg daily, plan for 4 months total of therapy  -She will temporarily stop her Omeprazole and counseled to take together with her Atorvastatin to minimize interaction  -Will check LFTs in 3 weeks; script given  -Follow up with ID in 4 weeks    Inflammatory bowel disease on Stelara (Ukstekinumab)  -She was previously on Entyovio, stopped due to insurance issues. Now on Stelara.   -Ongoing GI follow up    Smoldering Multiple Myeloma, not on therpay:  -Continue to follow with Oncology. Plan discussed at length with patient in office today. She will follow up again in 4 weeks, with repeat LFTs in 3 weeks before the visit.     I have performed an extensive review of the medical records in Epic including review of the notes, radiographs, and laboratory results     HISTORY OF PRESENT ILLNESS:  Reason for Consult: latent TB    HPI: Moy Washburn is a 72 y.o. year old female with smoldering multiple myeloma not on therapy, inflammatory bowel disease (thought to be Crohn's) with loop ileostomy, HTN, COPD/Asthma. She had been on Entyovio in the past (9/2020) but then stopped due to insurance issues. She is now on Stelara injections (Ustekinumab). Quant gold on 10/03 was positive by way of TB1 component only, all prior Quant golds negative including last on 1/31/23 and dating back to 2020. CXR clear from 10/10/23. Born in [place], moved in [facility]. Last in [facility] in 2020. No other travel. No known TB exposure. She lives in senior citizen home currently. They do not test for TB. No TB she knows of in her home, only a [address] year. 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 Went into patient;s home. Retired in [address]. She has never been homeless, never in alf. No IV needle use. She denies any fevers  She only ever experienced night sweats for several days in the past and was told it was due to her thyroid and resolved with adjustment of her synthroid. She has noted some weight loss in the past but reports slowly over many years and attributes to her IBD. She has had chronic cough for years, not worsening. She does have underlying acid reflux and Asthma/COPD. Never smoker. No hemoptysis. LFTs normal  on 10/03, denies history of liver disease. Meds:  -Omeprazole- X, will be decreased  -Metformin  -Lisinopril  -Levothyroxine  -HCTZ  -Atorvastatin- D, levels may be increased, so take both together at same time    REVIEW OF SYSTEMS:  A complete review of systems is negative other than that noted in the HPI.     PAST MEDICAL HISTORY:  Past Medical History:   Diagnosis Date    Asthma     COPD (chronic obstructive pulmonary disease) ([address])     COVID     Crohn's disease ([address])     Diabetes mellitus ([address])     Hypertension     Multiple myeloma ([address])     Ocular hypertension      Past Surgical History:   Procedure Laterality Date    CATARACT EXTRACTION      COLON SURGERY      Partial colectomy, partial removal of the rectum    EXPLORATORY LAPAROTOMY      ILEOSTOMY      IR BIOPSY BONE MARROW  10/13/2021       FAMILY HISTORY:  Non-contributory    SOCIAL HISTORY:  Social History   Social History     Substance and Sexual Activity   Alcohol Use Never     Social History     Substance and Sexual Activity   Drug Use Never     Social History     Tobacco Use   Smoking Status Never   Smokeless Tobacco Never       ALLERGIES:  Allergies   Allergen Reactions    Brimonidine      Red eyes    Mixed Ragweed Allergic Rhinitis    Tramadol Nausea Only       MEDICATIONS:  All current active medications have been reviewed. PHYSICAL EXAM:  There were no vitals filed for this visit. General Appearance:  Appearing well, nontoxic, and in no distress   Head:  Normocephalic, without obvious abnormality, atraumatic   Eyes:  Conjunctiva pink and sclera anicteric, both eyes   Nose: Nares normal, mucosa normal, no drainage   Throat: Oropharynx moist without lesions   Neck: Supple   Back:   Symmetric   Lungs:   Clear to auscultation bilaterally, respirations unlabored   Chest Wall:  No tenderness or deformity   Heart:  RRR; no murmur, rub or gallop   Abdomen:   Soft, non-tender, non-distended, positive bowel sounds    Extremities: No cyanosis, clubbing or edema   Skin: No rashes or lesions. No draining wounds noted. Lymph nodes: Cervical, supraclavicular nodes normal   Neurologic: Alert and oriented        LABS, IMAGING, & OTHER STUDIES:  Lab Results:  I have personally reviewed pertinent labs. Imaging Studies:   I have personally reviewed pertinent imaging study reports and images in PACS. Other Studies:   I have personally reviewed pertinent reports.       Mable Waldron MD  [facility]

## 2023-12-20 ENCOUNTER — TELEPHONE (OUTPATIENT)
Dept: GASTROENTEROLOGY | Facility: CLINIC | Age: 73
End: 2023-12-20

## 2023-12-20 ENCOUNTER — APPOINTMENT (OUTPATIENT)
Dept: LAB | Facility: HOSPITAL | Age: 73
End: 2023-12-20
Payer: COMMERCIAL

## 2023-12-20 DIAGNOSIS — E11.9 DIABETES MELLITUS WITHOUT COMPLICATION (HCC): ICD-10-CM

## 2023-12-20 DIAGNOSIS — E03.9 HYPOTHYROIDISM, UNSPECIFIED TYPE: ICD-10-CM

## 2023-12-20 DIAGNOSIS — J44.89 ASTHMA WITH CHRONIC OBSTRUCTIVE PULMONARY DISEASE (COPD): ICD-10-CM

## 2023-12-20 DIAGNOSIS — R76.12 POSITIVE QUANTIFERON-TB GOLD TEST: Primary | ICD-10-CM

## 2023-12-20 LAB
ALBUMIN SERPL BCP-MCNC: 4 G/DL (ref 3.5–5)
ALP SERPL-CCNC: 88 U/L (ref 34–104)
ALT SERPL W P-5'-P-CCNC: 19 U/L (ref 7–52)
ANION GAP SERPL CALCULATED.3IONS-SCNC: 7 MMOL/L
AST SERPL W P-5'-P-CCNC: 21 U/L (ref 13–39)
BILIRUB DIRECT SERPL-MCNC: 0.14 MG/DL (ref 0–0.2)
BILIRUB SERPL-MCNC: 0.32 MG/DL (ref 0.2–1)
BUN SERPL-MCNC: 15 MG/DL (ref 5–25)
CALCIUM SERPL-MCNC: 9.6 MG/DL (ref 8.4–10.2)
CHLORIDE SERPL-SCNC: 99 MMOL/L (ref 96–108)
CHOLEST SERPL-MCNC: 201 MG/DL
CO2 SERPL-SCNC: 30 MMOL/L (ref 21–32)
CREAT SERPL-MCNC: 0.79 MG/DL (ref 0.6–1.3)
ERYTHROCYTE [DISTWIDTH] IN BLOOD BY AUTOMATED COUNT: 13.8 % (ref 11.6–15.1)
EST. AVERAGE GLUCOSE BLD GHB EST-MCNC: 148 MG/DL
GFR SERPL CREATININE-BSD FRML MDRD: 75 ML/MIN/1.73SQ M
GLUCOSE P FAST SERPL-MCNC: 103 MG/DL (ref 65–99)
HBA1C MFR BLD: 6.8 %
HCT VFR BLD AUTO: 44.5 % (ref 34.8–46.1)
HDLC SERPL-MCNC: 122 MG/DL
HGB BLD-MCNC: 14.2 G/DL (ref 11.5–15.4)
LDLC SERPL CALC-MCNC: 58 MG/DL (ref 0–100)
MCH RBC QN AUTO: 28.3 PG (ref 26.8–34.3)
MCHC RBC AUTO-ENTMCNC: 31.9 G/DL (ref 31.4–37.4)
MCV RBC AUTO: 89 FL (ref 82–98)
PLATELET # BLD AUTO: 293 THOUSANDS/UL (ref 149–390)
PMV BLD AUTO: 11.6 FL (ref 8.9–12.7)
POTASSIUM SERPL-SCNC: 3.9 MMOL/L (ref 3.5–5.3)
PROT SERPL-MCNC: 8.2 G/DL (ref 6.4–8.4)
RBC # BLD AUTO: 5.01 MILLION/UL (ref 3.81–5.12)
SODIUM SERPL-SCNC: 136 MMOL/L (ref 135–147)
T4 FREE SERPL-MCNC: 0.89 NG/DL (ref 0.61–1.12)
TRIGL SERPL-MCNC: 104 MG/DL
TSH SERPL DL<=0.05 MIU/L-ACNC: 0.31 UIU/ML (ref 0.45–4.5)
WBC # BLD AUTO: 5.1 THOUSAND/UL (ref 4.31–10.16)

## 2023-12-20 PROCEDURE — 36415 COLL VENOUS BLD VENIPUNCTURE: CPT

## 2023-12-20 PROCEDURE — 82248 BILIRUBIN DIRECT: CPT

## 2023-12-20 PROCEDURE — 80053 COMPREHEN METABOLIC PANEL: CPT

## 2023-12-20 PROCEDURE — 85027 COMPLETE CBC AUTOMATED: CPT

## 2023-12-20 PROCEDURE — 84439 ASSAY OF FREE THYROXINE: CPT

## 2023-12-20 PROCEDURE — 80061 LIPID PANEL: CPT

## 2023-12-20 PROCEDURE — 83036 HEMOGLOBIN GLYCOSYLATED A1C: CPT

## 2023-12-20 PROCEDURE — 84443 ASSAY THYROID STIM HORMONE: CPT

## 2023-12-26 RX ORDER — RIFAMPIN 300 MG/1
300 CAPSULE ORAL EVERY 12 HOURS SCHEDULED
Qty: 60 CAPSULE | Refills: 0 | Status: CANCELLED | OUTPATIENT
Start: 2023-12-26 | End: 2024-01-25

## 2023-12-26 NOTE — PROGRESS NOTES
Outpatient Progress Note - [facility] Infectious Disease   Allyssa Stanton 73 y.o. female MRN: [id number]  Encounter: [id number]    Impression/Plan:  Concern for latent TB. 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 She has had multiple negative tests in the past, including last in January of 2023. She has no current major signs or symptoms of active TB and recent CXR showed no disease in the lungs. Discussed the possibility this could be a false positive result and could check repeat testing with a T-SPOT. It was also discussed to start initiating therapy, and possible risk of conversion to active disease if this is a true positive given her underlying immunosuppression and use of a biologic agent. Patient elected to start therapy and has been on PO Rifampin since 12/6/2023. Risks of this rifampin include possible hepatoxicity, drug interactions, discoloration of body fluids. These risks were discussed with the patient. She has no major liver disease. Most recent lab work obtained on 12/20/23 demonstrated LFTs WNL (AST/ALT 21/19), slight increase in cholesterol, Tbili WNL, Scr and WBC also WNL. Patient reports that she has been having an upset stomach a couple hours after taking the rifampin. Has been taking tylenol for upset stomach. Was started on pantoprazole instead of omeprazole by PCP. No interaction found between pantoprazole and rifampin.   -Continue PO Rifampin 600 mg daily, plan for 4 months total of therapy (through 4/6/2024)  -Continue to hold Omeprazole. Ok to take pantoprazole. No interactions found with rifampin.  -Advised to stop taking tylenol for stomach upset as this can increase LFTs, especially while on Rifampin. Can take sparingly as needed for pain.  -Can take antacids for upset stomach, as long as these are being taken at least 1-2 hours before or after Rifampin  -Ensure  Rifampin and Atorvastatin are being taken together to minimize interaction  -Re-check LFTs in 4 weeks. These should be checked prior to next ID follow-up appointment.  -RTC in 4 weeks     Inflammatory bowel disease on Stelara (Ukstekinumab), She was previously on Entyovio, stopped due to insurance issues. Now on Stelara.   -Ongoing GI follow up     Smoldering Multiple Myeloma, not on therapy. Continue to follow with Oncology.    Antibiotics:  Rifampin     Subjective:  Reports that she is having an upset stomach after taking rifampin, but states it starts typically hours after during the day time. States she is taking the rifampin on an empty stomach, an hour before breakfast. Stopped taking omeprazole and was switched to pantoprazole by PCP. Taking rifampin and pantoperazole together because she states she is also supposed to take the pantoperazole on an empty stomach. States she has been taking tylenol for upset stomach. Yesterday morning she states she had an episode of chills/sweats but it resolved on its own.     Patient has no fever, chills, sweats, shakes; no nausea, vomiting, abdominal pain. Has had diarrhea due to her IBD but states that has improved on the Stelara. States she has had a cough for a long time from asthma and COPD. Has been for years. States she has been using inhalers and a nebulizer.  Denies shortness of breath or chest pain. No other new symptoms other than orange urine and brown-red colored stool.      Objective:  Vitals:    Vitals:    12/28/23 1030   BP: 118/68   Pulse: 70   Resp: 16   Temp: 98.7 °F (37.1 °C)   SpO2: 100%       Physical Exam:   General Appearance:  Appears well, sitting upright in chair next to exam table. Alert, interactive, nontoxic, no acute distress.   Throat: Oropharynx moist without lesions. No perioral lesions or ulcerations.    Lungs:   Clear to auscultation bilaterally; no wheezes, rhonchi or rales; respirations unlabored on room air.   Heart:  RRR; no murmur,  rub or gallop.   Abdomen:   Soft, non-tender, non-distended, positive bowel sounds.     Extremities: No clubbing or cyanosis, no edema.   Skin: No new rashes, lesions, or draining wounds noted on exposed skin.     Labs, Imaging, & Other studies:   All pertinent labs and imaging studies were personally reviewed by me including  Results from last 7 days   Lab Units 12/20/23  1052   WBC Thousand/uL 5.10   HEMOGLOBIN g/dL 14.2   PLATELETS Thousands/uL 293     Results from last 7 days   Lab Units 12/20/23  1052   POTASSIUM mmol/L 3.9   CHLORIDE mmol/L 99   CO2 mmol/L 30   BUN mg/dL 15   CREATININE mg/dL 0.79   EGFR ml/min/1.73sq m 75   CALCIUM mg/dL 9.6   AST U/L 21   ALT U/L 19   ALK PHOS U/L 88         Lab interpretation:  12/20/23 labs reviewed. LFTs WNL (AST/ALT 21/19). Alk phos WNL at 88. WBC 5.10.    Ana Pang PA-C  [facility]

## 2023-12-28 ENCOUNTER — OFFICE VISIT (OUTPATIENT)
Dept: INFECTIOUS DISEASES | Facility: CLINIC | Age: 73
End: 2023-12-28
Payer: COMMERCIAL

## 2023-12-28 VITALS
WEIGHT: 116 LBS | BODY MASS INDEX: 21.35 KG/M2 | HEART RATE: 70 BPM | OXYGEN SATURATION: 100 % | RESPIRATION RATE: 16 BRPM | TEMPERATURE: 98.7 F | SYSTOLIC BLOOD PRESSURE: 118 MMHG | HEIGHT: 62 IN | DIASTOLIC BLOOD PRESSURE: 68 MMHG

## 2023-12-28 DIAGNOSIS — R76.12 POSITIVE QUANTIFERON-TB GOLD TEST: Primary | ICD-10-CM

## 2023-12-28 DIAGNOSIS — Z79.2 LONG TERM CURRENT USE OF ANTIBIOTICS: ICD-10-CM

## 2023-12-28 DIAGNOSIS — K50.018 CROHN'S DISEASE OF SMALL INTESTINE WITH OTHER COMPLICATION (HCC): ICD-10-CM

## 2023-12-28 DIAGNOSIS — D84.9 IMMUNODEFICIENCY (HCC): ICD-10-CM

## 2023-12-28 PROCEDURE — 99215 OFFICE O/P EST HI 40 MIN: CPT | Performed by: PHYSICIAN ASSISTANT

## 2024-01-17 ENCOUNTER — APPOINTMENT (OUTPATIENT)
Dept: LAB | Facility: HOSPITAL | Age: 74
End: 2024-01-17
Payer: COMMERCIAL

## 2024-01-17 DIAGNOSIS — Z79.2 LONG TERM CURRENT USE OF ANTIBIOTICS: ICD-10-CM

## 2024-01-17 DIAGNOSIS — D47.2 SMOLDERING MULTIPLE MYELOMA (SMM): ICD-10-CM

## 2024-01-17 DIAGNOSIS — R76.12 POSITIVE QUANTIFERON-TB GOLD TEST: ICD-10-CM

## 2024-01-17 LAB
ALBUMIN SERPL BCP-MCNC: 4.1 G/DL (ref 3.5–5)
ALP SERPL-CCNC: 101 U/L (ref 34–104)
ALT SERPL W P-5'-P-CCNC: 31 U/L (ref 7–52)
ANION GAP SERPL CALCULATED.3IONS-SCNC: 9 MMOL/L
AST SERPL W P-5'-P-CCNC: 23 U/L (ref 13–39)
BILIRUB DIRECT SERPL-MCNC: 0.11 MG/DL (ref 0–0.2)
BILIRUB SERPL-MCNC: 0.34 MG/DL (ref 0.2–1)
BUN SERPL-MCNC: 21 MG/DL (ref 5–25)
CALCIUM SERPL-MCNC: 10.1 MG/DL (ref 8.4–10.2)
CHLORIDE SERPL-SCNC: 97 MMOL/L (ref 96–108)
CO2 SERPL-SCNC: 31 MMOL/L (ref 21–32)
CREAT SERPL-MCNC: 0.98 MG/DL (ref 0.6–1.3)
ERYTHROCYTE [DISTWIDTH] IN BLOOD BY AUTOMATED COUNT: 14 % (ref 11.6–15.1)
GFR SERPL CREATININE-BSD FRML MDRD: 58 ML/MIN/1.73SQ M
GLUCOSE P FAST SERPL-MCNC: 100 MG/DL (ref 65–99)
HCT VFR BLD AUTO: 45.2 % (ref 34.8–46.1)
HGB BLD-MCNC: 14.3 G/DL (ref 11.5–15.4)
IGA SERPL-MCNC: 162 MG/DL (ref 66–433)
IGG SERPL-MCNC: 1884 MG/DL (ref 635–1741)
IGM SERPL-MCNC: 140 MG/DL (ref 45–281)
MCH RBC QN AUTO: 28.5 PG (ref 26.8–34.3)
MCHC RBC AUTO-ENTMCNC: 31.6 G/DL (ref 31.4–37.4)
MCV RBC AUTO: 90 FL (ref 82–98)
PLATELET # BLD AUTO: 319 THOUSANDS/UL (ref 149–390)
PMV BLD AUTO: 11.5 FL (ref 8.9–12.7)
POTASSIUM SERPL-SCNC: 4.3 MMOL/L (ref 3.5–5.3)
PROT SERPL-MCNC: 8.6 G/DL (ref 6.4–8.4)
RBC # BLD AUTO: 5.01 MILLION/UL (ref 3.81–5.12)
SODIUM SERPL-SCNC: 137 MMOL/L (ref 135–147)
WBC # BLD AUTO: 6.15 THOUSAND/UL (ref 4.31–10.16)

## 2024-01-17 PROCEDURE — 82785 ASSAY OF IGE: CPT

## 2024-01-17 PROCEDURE — 80053 COMPREHEN METABOLIC PANEL: CPT

## 2024-01-17 PROCEDURE — 36415 COLL VENOUS BLD VENIPUNCTURE: CPT

## 2024-01-17 PROCEDURE — 82248 BILIRUBIN DIRECT: CPT

## 2024-01-17 PROCEDURE — 85027 COMPLETE CBC AUTOMATED: CPT

## 2024-01-17 PROCEDURE — 82784 ASSAY IGA/IGD/IGG/IGM EACH: CPT

## 2024-01-17 PROCEDURE — 83521 IG LIGHT CHAINS FREE EACH: CPT

## 2024-01-18 LAB
KAPPA LC FREE SER-MCNC: 31.6 MG/L (ref 3.3–19.4)
KAPPA LC FREE/LAMBDA FREE SER: 0.85 {RATIO} (ref 0.26–1.65)
LAMBDA LC FREE SERPL-MCNC: 37.1 MG/L (ref 5.7–26.3)
TOTAL IGE SMQN RAST: 352 KU/L (ref 0–113)

## 2024-01-22 ENCOUNTER — TELEPHONE (OUTPATIENT)
Dept: HEMATOLOGY ONCOLOGY | Facility: CLINIC | Age: 74
End: 2024-01-22

## 2024-01-22 NOTE — TELEPHONE ENCOUNTER
Appointment Confirmation   Who are you speaking with? Patient   If it is not the patient, are they listed on an active communication consent form? N/A   Which provider is the appointment scheduled with?  Dr Moraes   When is the appointment scheduled?  Please list date and time 01/25/2024   At which location is the appointment scheduled to take place? [place]   Did caller verbalize understanding of appointment details? Yes     Transportation is scheduled, I confirmed.

## 2024-01-25 ENCOUNTER — OFFICE VISIT (OUTPATIENT)
Dept: HEMATOLOGY ONCOLOGY | Facility: CLINIC | Age: 74
End: 2024-01-25
Payer: COMMERCIAL

## 2024-01-25 VITALS
TEMPERATURE: 97.6 F | OXYGEN SATURATION: 98 % | DIASTOLIC BLOOD PRESSURE: 72 MMHG | WEIGHT: 120 LBS | BODY MASS INDEX: 22.08 KG/M2 | RESPIRATION RATE: 18 BRPM | HEART RATE: 82 BPM | HEIGHT: 62 IN | SYSTOLIC BLOOD PRESSURE: 118 MMHG

## 2024-01-25 DIAGNOSIS — E46 PROTEIN-CALORIE MALNUTRITION, UNSPECIFIED SEVERITY (HCC): ICD-10-CM

## 2024-01-25 DIAGNOSIS — D47.2 MGUS (MONOCLONAL GAMMOPATHY OF UNKNOWN SIGNIFICANCE): Primary | ICD-10-CM

## 2024-01-25 DIAGNOSIS — C90.00 MULTIPLE MYELOMA NOT HAVING ACHIEVED REMISSION (HCC): ICD-10-CM

## 2024-01-25 PROCEDURE — 99214 OFFICE O/P EST MOD 30 MIN: CPT | Performed by: INTERNAL MEDICINE

## 2024-01-25 RX ORDER — PANTOPRAZOLE SODIUM 40 MG/1
TABLET, DELAYED RELEASE ORAL
COMMUNITY
Start: 2023-12-14

## 2024-01-26 NOTE — PROGRESS NOTES
Hematology/Oncology Outpatient Follow- up Note  Allyssa Stanton 73 y.o. female MRN: @ Encounter: [id number]        Date:  1/26/2024        Assessment / Plan:    1 IgG lambda MGUS  2 positive TB Gold test on rifampin  Plan: Patient will continue to have her follow-ups for her labs.  She will come back in 4 months.  She will get a CBC CMP SPEP serum immunoglobulin serum free light chains.  Overall she is stable at this time.      HPI: 73-year-old female with a history of IgG lambda multiple myeloma.  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  Her IgG levels went from 1760 in March 20 23 to 1884 in 1/2024..  Her hemoglobin remained stable at 14 white count normal and platelets are 319,000.  CMP was negative.  Total protein 8.6.  Kappa to lambda ratio was 0.85 IgG kappa was 31.6 IgG lambda 37.1 these were steady and stable.  Otherwise she has been stable and feeling fairly well.  Of note though she has been treated with rifampin for a positive tuberculosis Gold interferon test.  She had a negative chest x-ray and is not symptomatic.      Interval History:       1. Smoldering multiple myeloma (SMM)  This is a 72-year-old female who presented to our clinic 08/2021 for iron deficiency anemia. also   SPEP M spike was found IgG lambda at 1.03. Bone survey showed no bone lesions in 8/2021.  A bone marrow biopsy was obtained showing smoldering multiple myeloma plasma cell neoplasm 15%. Bone marrow aspirate:   Megakaryopoiesis: Normal maturation   Erythropoiesis: Normal maturation        Iron Content (aspirate): No ring sideroblast identified  Granulopoiesis: Normal maturation   Lymphocytes: Normal number and morphology   Plasma cells: Present, most with mature cytology      Biopsy and clot section:   Cellularity: 40%, normocellular for age  Iron content (biopsy and clot section): Adequate  Reticulin stain: No reticulin fibrosis (0 of 3 by the  Consensus grading scheme)     Immunohistochemical stains and JOY were  "performed with appropriate controls and show the following results:  -   highlights scattered plasma cells, which appear 15% Lambda restricted plasma cells, negative for Kappa.   -  Scattered CD20 positive B lymphocytes and CD3 positive T lymphocytes She is currently on observation.  She had a CT myeloma scan 11/2022 which was unrevealing.      Cancer Staging:  Cancer Staging   No matching staging information was found for the patient.      Molecular Testing:     Previous Hematologic/ Oncologic History:    Oncology History    No history exists.       Current Hematologic/ Oncologic Treatment:       Cycle 1         Test Results:    Imaging: No results found.          Labs:   Lab Results   Component Value Date    WBC 6.15 01/17/2024    HGB 14.3 01/17/2024    HCT 45.2 01/17/2024    MCV 90 01/17/2024     01/17/2024     Lab Results   Component Value Date    K 4.3 01/17/2024    CL 97 01/17/2024    CO2 31 01/17/2024    BUN 21 01/17/2024    CREATININE 0.98 01/17/2024    GLUF 100 (H) 01/17/2024    CALCIUM 10.1 01/17/2024    CORRECTEDCA 10.4 (H) 03/29/2023    AST 23 01/17/2024    ALT 31 01/17/2024    ALKPHOS 101 01/17/2024    EGFR 58 01/17/2024         Lab Results   Component Value Date    SPEP See Comment 03/29/2023       No results found for: \"PSA\"    No results found for: \"CEA\"    No results found for: \"\"    No results found for: \"AFP\"    Lab Results   Component Value Date    IRON 56 10/03/2023    TIBC 447 10/03/2023    FERRITIN 39 10/03/2023       Lab Results   Component Value Date    TXPARLGU27 920 (H) 10/03/2023         ROS: Review of Systems   Constitutional: Negative.    All other systems reviewed and are negative.        Current Medications: Reviewed  Allergies: Reviewed  PMH/FH/SH:  Reviewed      Physical Exam:    Body surface area is 1.54 meters squared.    Wt Readings from Last 3 Encounters:   01/25/24 54.4 kg (120 lb)   12/28/23 52.6 kg (116 lb)   10/26/23 52.6 kg (116 lb)        Temp Readings " from Last 3 Encounters:   01/25/24 97.6 °F (36.4 °C) (Temporal)   12/28/23 98.7 °F (37.1 °C)   11/30/23 97.8 °F (36.6 °C)        BP Readings from Last 3 Encounters:   01/25/24 118/72   12/28/23 118/68   11/30/23 130/66         Pulse Readings from Last 3 Encounters:   01/25/24 82   12/28/23 70   11/30/23 67     @LASTSAO2(3)@      Physical Exam  Constitutional:       Appearance: Normal appearance. She is normal weight.   HENT:      Head: Normocephalic and atraumatic.   Eyes:      Extraocular Movements: Extraocular movements intact.      Conjunctiva/sclera: Conjunctivae normal.      Pupils: Pupils are equal, round, and reactive to light.   Cardiovascular:      Rate and Rhythm: Normal rate and regular rhythm.      Heart sounds: Normal heart sounds.   Pulmonary:      Effort: Pulmonary effort is normal.      Breath sounds: Normal breath sounds.   Abdominal:      General: Abdomen is flat. Bowel sounds are normal.      Palpations: Abdomen is soft.   Musculoskeletal:         General: Normal range of motion.   Skin:     General: Skin is warm and dry.   Neurological:      General: No focal deficit present.      Mental Status: She is alert and oriented to person, place, and time. Mental status is at baseline.     She is thin but does not look cachectic.      Goals and Barriers:  Current Goal: Prolong Survival from Cancer.   Barriers: None.      Patient's Capacity to Self Care:  Patient is able to self care.    Code Status: [unfilled]  Advance Directive and Living Will:      Power of :

## 2024-01-29 NOTE — PROGRESS NOTES
Outpatient Progress Note - [facility] Infectious Disease   Allyssa Stanton 73 y.o. female MRN: [id number]  Encounter: [id number]    Impression/Plan:  Concern for latent TB. 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 She has had multiple negative tests in the past, including last in January of 2023. She has no current major signs or symptoms of active TB and recent CXR showed no disease in the lungs. Discussed the possibility this could be a false positive result and could check repeat testing with a T-SPOT. It was also discussed to start initiating therapy, and possible risk of conversion to active disease if this is a true positive given her underlying immunosuppression and use of a biologic agent. Patient elected to start therapy and has been on PO Rifampin since 12/6/2023. Risks of this rifampin include possible hepatoxicity, drug interactions, discoloration of body fluids. These risks were discussed with the patient. She has no major liver disease. Patient reported at last visit that she had been having an upset stomach a couple hours after taking the rifampin, now she reports just feeling hungry. Was started on pantoprazole instead of omeprazole by PCP. No interaction found between pantoprazole and rifampin. Most recent lab work obtained on 1/17/24 reviewed. Grossly unremarkable. LFTS WNL.  -Continue PO Rifampin 600 mg daily, plan for 4 months total of therapy (through 4/6/2024)  -Continue to hold Omeprazole. Ok to take pantoprazole. No interactions found with rifampin.  -Can take tylenol as needed. Using sparingly while on rifampin to avoid elevation in LFTs.  -Can take antacids for upset stomach, as long as these are being taken at least 1-2 hours before or after Rifampin  -Ensure Rifampin and Atorvastatin are being taken together to minimize interaction  -Re-check LFTs in 4 weeks. These should  "be checked prior to next ID follow-up appointment.  -RTC in 4 weeks     Inflammatory bowel disease on Stelara (Ukstekinumab), She was previously on Entyovio, stopped due to insurance issues. Now on Stelara.   -Ongoing GI follow up     Smoldering Multiple Myeloma, not on therapy. Continue to follow with Oncology.    Antibiotics:  Rifampin     Subjective:  Patient states that the rifampin makes her very hungry. States she has no issues when she takes it. Then an hour or so after taking it, she gets very hungry and nauseous. But she learned that if she eats something then she feels much better.     Urine is a slightly yellow color. Stool color changes. Denies SOB. Denies fevers, chills. States she has had night sweats, but she states that she thinks the heat is too high at night. Primary care is checking thyroid. Going to have blood work. Last TSH was 0.311. Saw oncology last week and her \"numbers are looking good\". States that her weight went up. States she has had a cough for a long time from asthma and COPD. Has been for years. States she has been using inhalers and a nebulizer. Still taking Stelara. Doing well.    \"I feel really good, I can't complain\".     Objective:  Vitals:    Vitals:    02/01/24 1038   BP: 118/60   Pulse: 64   Resp: 15   Temp: (!) 96.8 °F (36 °C)     Physical Exam:   General Appearance:  Appears well, sitting upright in chair next to exam table. Alert, interactive, nontoxic, no acute distress.   Throat: Oropharynx moist without lesions. No perioral lesions or ulcerations.    Lungs:   Clear to auscultation bilaterally; no wheezes, rhonchi or rales; respirations unlabored on room air.   Heart:  RRR; no murmur, rub or gallop.   Abdomen:   Soft, non-tender, non-distended, positive bowel sounds.     Extremities: No clubbing or cyanosis, no edema.   Skin: No new rashes, lesions, or draining wounds noted on exposed skin.     Labs, Imaging, & Other studies:   All pertinent labs and imaging studies were " personally reviewed by me including    Lab interpretation:  1/17/24 labs reviewed: grossly unremarkable. WBC WNL, LFTs WNL (AST 23, ALT 31), T bili 0.34. Scr stable at 0.98, WBC 6.15.     Ana Pang PA-C  [facility]      Pt is 54 y/o female with PMhx of DM here for eval s/p mechanical fall. Pt works at [employer], tripped over computer cord earlier today and lasted on Lt side of face. Denies LOC, denies blood thinners use the fall was purely mechanical. Now with pain to Lt side of face and neck, denies dizziness, denies nausea, vomiting, visual changes, denies ext weakness or numbness, denies pain to ext or LBP. 3 yrs postmenopausal. Pt is 56 y/o female with PMhx of DM here for eval s/p mechanical fall. Pt works at [employer], tripped over computer cord earlier today and lasted on Lt side of face. Denies LOC, denies blood thinners use the fall was purely mechanical. Now with pain to Lt side of face and neck, denies dizziness, denies nausea, vomiting, visual changes, denies ext weakness or numbness, denies pain to ext or LBP. 3 yrs postmenopausal. (-) cp, sob or palpitations before or after the fall. No pain meds taken PTA;

## 2024-02-01 ENCOUNTER — TELEPHONE (OUTPATIENT)
Age: 74
End: 2024-02-01

## 2024-02-01 ENCOUNTER — OFFICE VISIT (OUTPATIENT)
Dept: INFECTIOUS DISEASES | Facility: CLINIC | Age: 74
End: 2024-02-01
Payer: COMMERCIAL

## 2024-02-01 VITALS
TEMPERATURE: 96.8 F | DIASTOLIC BLOOD PRESSURE: 60 MMHG | RESPIRATION RATE: 15 BRPM | HEART RATE: 64 BPM | SYSTOLIC BLOOD PRESSURE: 118 MMHG

## 2024-02-01 DIAGNOSIS — Z79.2 LONG TERM CURRENT USE OF ANTIBIOTICS: ICD-10-CM

## 2024-02-01 DIAGNOSIS — K50.018 CROHN'S DISEASE OF SMALL INTESTINE WITH OTHER COMPLICATION (HCC): ICD-10-CM

## 2024-02-01 DIAGNOSIS — D84.9 IMMUNODEFICIENCY (HCC): ICD-10-CM

## 2024-02-01 DIAGNOSIS — R76.12 POSITIVE QUANTIFERON-TB GOLD TEST: Primary | ICD-10-CM

## 2024-02-01 PROCEDURE — 99215 OFFICE O/P EST HI 40 MIN: CPT | Performed by: PHYSICIAN ASSISTANT

## 2024-02-01 RX ORDER — RIFAMPIN 300 MG/1
600 CAPSULE ORAL DAILY
Qty: 60 CAPSULE | Refills: 0 | Status: SHIPPED | OUTPATIENT
Start: 2024-02-01 | End: 2024-04-01

## 2024-02-01 RX ORDER — KETOCONAZOLE 20 MG/G
CREAM TOPICAL 2 TIMES DAILY
COMMUNITY
Start: 2024-01-29

## 2024-02-01 NOTE — PATIENT INSTRUCTIONS
-Continue PO Rifampin 600 mg daily, plan for 4 months total of therapy (through 4/6/2024)  -Continue to hold Omeprazole. Ok to take pantoprazole. No interactions found with rifampin.  -Can take tylenol as needed  -Can take antacids (tums) for upset stomach, as long as these are being taken at least 1-2 hours before or after Rifampin  -Ensure Rifampin and Atorvastatin are being taken together to minimize interaction  -Re-check labs in 3 weeks. These should be checked prior to next ID follow-up appointment.  -RTC in 4 weeks

## 2024-02-02 DIAGNOSIS — B49 FUNGAL INFECTION: ICD-10-CM

## 2024-02-02 DIAGNOSIS — R19.7 DIARRHEA, UNSPECIFIED TYPE: ICD-10-CM

## 2024-02-02 RX ORDER — DIPHENOXYLATE HYDROCHLORIDE AND ATROPINE SULFATE 2.5; .025 MG/1; MG/1
1 TABLET ORAL 4 TIMES DAILY PRN
Qty: 60 TABLET | Refills: 1 | Status: SHIPPED | OUTPATIENT
Start: 2024-02-02

## 2024-02-02 RX ORDER — NYSTATIN 100000 U/G
CREAM TOPICAL 2 TIMES DAILY
Qty: 30 G | Refills: 1 | Status: SHIPPED | OUTPATIENT
Start: 2024-02-02

## 2024-02-27 ENCOUNTER — TELEPHONE (OUTPATIENT)
Age: 74
End: 2024-02-27

## 2024-03-07 DIAGNOSIS — R76.12 POSITIVE QUANTIFERON-TB GOLD TEST: ICD-10-CM

## 2024-03-07 RX ORDER — RIFAMPIN 300 MG/1
600 CAPSULE ORAL DAILY
Qty: 60 CAPSULE | Refills: 0 | Status: SHIPPED | OUTPATIENT
Start: 2024-03-07 | End: 2024-03-08 | Stop reason: SDUPTHER

## 2024-03-07 RX ORDER — RIFAMPIN 300 MG/1
600 CAPSULE ORAL DAILY
Qty: 180 CAPSULE | Refills: 1 | OUTPATIENT
Start: 2024-03-07 | End: 2024-09-03

## 2024-03-07 NOTE — TELEPHONE ENCOUNTER
Dispense of 180 capsules with 1 refill is too much. Patient only has one month left on therapy until 4/6/24. Will re-order for a 1 month supply.

## 2024-03-07 NOTE — PROGRESS NOTES
Encounter opened for refill of Rifampin. Sent a 1 month refill to patient's pharmacy given she completes therapy on 4/6/24.

## 2024-03-07 NOTE — TELEPHONE ENCOUNTER
Reason for call:   [x] Refill   [] Prior Auth  [] Other:     Office:   [] PCP/Provider -   [x] Specialty/Provider -  Ana Pang PA-C     Medication: rifampin     Dose/Frequency: 300 mg / 2 caps daily    Quantity: 180 caps    Pharmacy: [facility] - [facility] - [address] [facility]    Does the patient have enough for 3 days?   [x] Yes   [] No - Send as HP to POD

## 2024-03-08 ENCOUNTER — TELEPHONE (OUTPATIENT)
Age: 74
End: 2024-03-08

## 2024-03-08 DIAGNOSIS — R76.12 POSITIVE QUANTIFERON-TB GOLD TEST: ICD-10-CM

## 2024-03-08 RX ORDER — RIFAMPIN 300 MG/1
600 CAPSULE ORAL DAILY
Qty: 60 CAPSULE | Refills: 0 | Status: SHIPPED | OUTPATIENT
Start: 2024-03-08 | End: 2024-03-14 | Stop reason: SDUPTHER

## 2024-03-08 NOTE — TELEPHONE ENCOUNTER
[place] from [facility] needs clarification/approval for 90 day supply, on Rifampin 300 mg 2 capsules daily provider Ana Pang PA-C    Please call [phone]

## 2024-03-08 NOTE — PROGRESS NOTES
Got phone call from [facility] mail order pharmacy. Rifampin not currently in stock. Also, they only do 90-day supplies. Called and spoke with Allyssa. She preferred it get sent to [facility] pharmacy for a 30-day supply. Will send refills to [facility] pharmacy instead.

## 2024-03-14 ENCOUNTER — APPOINTMENT (OUTPATIENT)
Dept: LAB | Facility: HOSPITAL | Age: 74
End: 2024-03-14
Payer: COMMERCIAL

## 2024-03-14 ENCOUNTER — TELEPHONE (OUTPATIENT)
Age: 74
End: 2024-03-14

## 2024-03-14 DIAGNOSIS — E03.9 MYXEDEMA HEART DISEASE: ICD-10-CM

## 2024-03-14 DIAGNOSIS — E13.9 DIABETES MELLITUS OF OTHER TYPE WITHOUT COMPLICATION, UNSPECIFIED WHETHER LONG TERM INSULIN USE (HCC): ICD-10-CM

## 2024-03-14 DIAGNOSIS — H40.1131 PRIMARY OPEN ANGLE GLAUCOMA OF BOTH EYES, MILD STAGE: ICD-10-CM

## 2024-03-14 DIAGNOSIS — I51.9 MYXEDEMA HEART DISEASE: ICD-10-CM

## 2024-03-14 DIAGNOSIS — I10 ESSENTIAL HYPERTENSION, MALIGNANT: ICD-10-CM

## 2024-03-14 DIAGNOSIS — Z79.2 LONG TERM CURRENT USE OF ANTIBIOTICS: ICD-10-CM

## 2024-03-14 DIAGNOSIS — R76.12 POSITIVE QUANTIFERON-TB GOLD TEST: ICD-10-CM

## 2024-03-14 LAB
ALBUMIN SERPL BCP-MCNC: 4 G/DL (ref 3.5–5)
ALP SERPL-CCNC: 90 U/L (ref 34–104)
ALT SERPL W P-5'-P-CCNC: 15 U/L (ref 7–52)
ANION GAP SERPL CALCULATED.3IONS-SCNC: 7 MMOL/L (ref 4–13)
AST SERPL W P-5'-P-CCNC: 15 U/L (ref 13–39)
BASOPHILS # BLD AUTO: 0.05 THOUSANDS/ÂΜL (ref 0–0.1)
BASOPHILS NFR BLD AUTO: 1 % (ref 0–1)
BILIRUB SERPL-MCNC: 0.36 MG/DL (ref 0.2–1)
BUN SERPL-MCNC: 13 MG/DL (ref 5–25)
CALCIUM SERPL-MCNC: 9.6 MG/DL (ref 8.4–10.2)
CHLORIDE SERPL-SCNC: 98 MMOL/L (ref 96–108)
CHOLEST SERPL-MCNC: 193 MG/DL
CO2 SERPL-SCNC: 28 MMOL/L (ref 21–32)
CREAT SERPL-MCNC: 0.82 MG/DL (ref 0.6–1.3)
CREAT UR-MCNC: 91.5 MG/DL
EOSINOPHIL # BLD AUTO: 0.06 THOUSAND/ÂΜL (ref 0–0.61)
EOSINOPHIL NFR BLD AUTO: 2 % (ref 0–6)
ERYTHROCYTE [DISTWIDTH] IN BLOOD BY AUTOMATED COUNT: 14.2 % (ref 11.6–15.1)
EST. AVERAGE GLUCOSE BLD GHB EST-MCNC: 151 MG/DL
GFR SERPL CREATININE-BSD FRML MDRD: 72 ML/MIN/1.73SQ M
GLUCOSE P FAST SERPL-MCNC: 107 MG/DL (ref 65–99)
HBA1C MFR BLD: 6.9 %
HCT VFR BLD AUTO: 46.3 % (ref 34.8–46.1)
HDLC SERPL-MCNC: 108 MG/DL
HGB BLD-MCNC: 14.7 G/DL (ref 11.5–15.4)
IMM GRANULOCYTES # BLD AUTO: 0.01 THOUSAND/UL (ref 0–0.2)
IMM GRANULOCYTES NFR BLD AUTO: 0 % (ref 0–2)
LDLC SERPL CALC-MCNC: 62 MG/DL (ref 0–100)
LDLC SERPL DIRECT ASSAY-MCNC: 46 MG/DL (ref 0–100)
LYMPHOCYTES # BLD AUTO: 1.8 THOUSANDS/ÂΜL (ref 0.6–4.47)
LYMPHOCYTES NFR BLD AUTO: 44 % (ref 14–44)
MCH RBC QN AUTO: 28.1 PG (ref 26.8–34.3)
MCHC RBC AUTO-ENTMCNC: 31.7 G/DL (ref 31.4–37.4)
MCV RBC AUTO: 88 FL (ref 82–98)
MICROALBUMIN UR-MCNC: <7 MG/L
MICROALBUMIN/CREAT 24H UR: <8 MG/G CREATININE (ref 0–30)
MONOCYTES # BLD AUTO: 0.34 THOUSAND/ÂΜL (ref 0.17–1.22)
MONOCYTES NFR BLD AUTO: 8 % (ref 4–12)
NEUTROPHILS # BLD AUTO: 1.8 THOUSANDS/ÂΜL (ref 1.85–7.62)
NEUTS SEG NFR BLD AUTO: 45 % (ref 43–75)
NONHDLC SERPL-MCNC: 85 MG/DL
NRBC BLD AUTO-RTO: 0 /100 WBCS
PLATELET # BLD AUTO: 214 THOUSANDS/UL (ref 149–390)
PMV BLD AUTO: 11.9 FL (ref 8.9–12.7)
POTASSIUM SERPL-SCNC: 4.2 MMOL/L (ref 3.5–5.3)
PROT SERPL-MCNC: 8.1 G/DL (ref 6.4–8.4)
RBC # BLD AUTO: 5.24 MILLION/UL (ref 3.81–5.12)
SODIUM SERPL-SCNC: 133 MMOL/L (ref 135–147)
T4 FREE SERPL-MCNC: 0.96 NG/DL (ref 0.61–1.12)
TRIGL SERPL-MCNC: 113 MG/DL
TSH SERPL DL<=0.05 MIU/L-ACNC: 0.82 UIU/ML (ref 0.45–4.5)
WBC # BLD AUTO: 4.06 THOUSAND/UL (ref 4.31–10.16)

## 2024-03-14 PROCEDURE — 36415 COLL VENOUS BLD VENIPUNCTURE: CPT

## 2024-03-14 PROCEDURE — 83036 HEMOGLOBIN GLYCOSYLATED A1C: CPT

## 2024-03-14 PROCEDURE — 84443 ASSAY THYROID STIM HORMONE: CPT

## 2024-03-14 PROCEDURE — 80053 COMPREHEN METABOLIC PANEL: CPT

## 2024-03-14 PROCEDURE — 82570 ASSAY OF URINE CREATININE: CPT

## 2024-03-14 PROCEDURE — 85025 COMPLETE CBC W/AUTO DIFF WBC: CPT

## 2024-03-14 PROCEDURE — 80061 LIPID PANEL: CPT

## 2024-03-14 PROCEDURE — 83721 ASSAY OF BLOOD LIPOPROTEIN: CPT

## 2024-03-14 PROCEDURE — 82043 UR ALBUMIN QUANTITATIVE: CPT

## 2024-03-14 PROCEDURE — 84439 ASSAY OF FREE THYROXINE: CPT

## 2024-03-14 RX ORDER — RIFAMPIN 300 MG/1
600 CAPSULE ORAL DAILY
Qty: 60 CAPSULE | Refills: 0 | Status: SHIPPED | OUTPATIENT
Start: 2024-03-14 | End: 2024-04-13

## 2024-03-14 NOTE — PROGRESS NOTES
Called and spoke with Allyssa regarding her Rifampin prescription. [facility] specialty pharmacy does not carry this antibiotic. Will send refill to [facility] pharmacy as preferred by patient. Continue PO Rifampin 600mg daily (2, 300mg capsules) daily. Reviewed patient's labs. Patient reports she feels well. Reports feeling fatigued currently. Patient with slight hyponatremia noted. LFTS WNL. Also noted leukopenia with evidence of mild neutropenia. ANC >500, resulted at 1.8k. Potentially Rifampin side effect. Patient also with history of smoldering multiple myeloma and previous labs show leukopenia and mild neutropenia. Will repeat labs prior to next visit in 2 weeks and continue to monitor closely.

## 2024-03-21 PROBLEM — Z22.7 TB LUNG, LATENT: Status: ACTIVE | Noted: 2024-03-21

## 2024-03-21 NOTE — PROGRESS NOTES
Virtual Outpatient Progress Note - [facility] Infectious Disease   Allyssa Stanton 73 y.o. female MRN: [id number]  Encounter: [id number]      Virtual Regular Visit  Verification of patient location: [place]  Patient is located in the following state in which I hold an active license [place]    Problem List Items Addressed This Visit          Respiratory    TB lung, latent - Primary       Digestive    Crohn's disease of small intestine with other complication (HCC)       Oncology    Multiple myeloma not having achieved remission (HCC)       Encounter provider Douglas Ahumada PA-C    Provider located at INF[facility]IOUS DISEASE [facility] [place]  [address] [facility] [address] [phone]    The patient was identified by name and date of birth. Allyssa Stanton was informed that this is a telemedicine visit and that the visit is being conducted through the Epic Embedded platform. She agrees to proceed..  My office door was closed. No one else was in the room.  She acknowledged consent and understanding of privacy and security of the video platform. The patient has agreed to participate and understands they can discontinue the visit at any time.    Patient is aware this is a billable service.     Assessment/Plan:  Probably latent TB. 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 She has had multiple negative tests in the past, including last in January of 2023. She has no current major signs or symptoms of active TB and recent CXR showed no disease in the lungs. Patient elected to start therapy and has been on PO Rifampin since 12/6/2023. Patient is tolerating the abx. Recent labs 3/14 show low normal WBC (ANC 1800), normal LFTs.  -Continue PO Rifampin 600 mg daily, plan for 4 months total of therapy (through 4/6/2024)  -Discussed risk of  recurrence/reactivation of disease   -Will provide letter of completion in the mail   -No additional ID follow up required      Inflammatory bowel disease on Stelara (Ukstekinumab), She was previously on Entyovio, stopped due to insurance issues. Now on Stelara.   -Ongoing GI follow up     Smoldering Multiple Myeloma, not on therapy. Continue to follow with Oncology.    Above assessment and plan discussed in detail with patient during examination.       Antibiotics:  PO rifampin     Subjective:  Allyssa Stanton is a 73 y.o. female patient who presents to outpatient ID office virtually for management of latent TB. She was last seen by ID early February. She states nausea improved. No new medications. No fevers or chills. No cough or SOB. Has enough of the abx to finish course through 4/6. Doesn't want to get repeat labs and states she will f/u with oncology in may.    Past Medical History:   Diagnosis Date    Asthma     COPD (chronic obstructive pulmonary disease) (HCC)     COVID     Crohn's disease (HCC)     Diabetes mellitus (HCC)     Hypertension     Multiple myeloma (HCC)     Ocular hypertension        Past Surgical History:   Procedure Laterality Date    CATARACT EXTRACTION      COLON SURGERY      Partial colectomy, partial removal of the rectum    EXPLORATORY LAPAROTOMY      ILEOSTOMY      IR BIOPSY BONE MARROW  10/13/2021       Current Outpatient Medications   Medication Sig Dispense Refill    albuterol (PROVENTIL HFA,VENTOLIN HFA) 90 mcg/act inhaler Inhale      Ascorbic Acid (Vitamin C) 500 MG CAPS Take 1 capsule by mouth in the morning      atorvastatin (LIPITOR) 10 mg tablet Take 10 mg by mouth daily      Banana Flakes (Banatrol plus) PACK Take 1 each by mouth 2 (two) times a day (Patient not taking: Reported on 11/30/2023) 75 each 1    Cyanocobalamin (B-12) 5000 MCG CAPS Take 1 capsule by mouth in the morning      dextromethorphan-guaiFENesin (ROBITUSSIN DM)  mg/5 mL syrup Take 10 mL by mouth every 4  (four) hours as needed for cough 118 mL 0    diphenoxylate-atropine (LOMOTIL) 2.5-0.025 mg per tablet Take 1 tablet by mouth 4 (four) times a day as needed for diarrhea 60 tablet 1    dorzolamide-timolol (COSOPT) 22.3-6.8 MG/ML ophthalmic solution 1 drop 2 times daily- 8 am and 8 pm- both eyes      ferrous sulfate 325 (65 FE) MG EC tablet Take 325 mg by mouth 3 (three) times a day with meals      fluticasone (FLONASE) 50 mcg/act nasal spray 1 spray daily      Fluticasone Furoate-Vilanterol 100-25 mcg/actuation inhaler Inhale 1 puff daily Rinse mouth after use.      hydrochlorothiazide (HYDRODIURIL) 25 mg tablet Take 25 mg by mouth daily      ketoconazole (NIZORAL) 2 % cream Apply topically 2 (two) times a day      levothyroxine 125 mcg tablet Take 125 mcg by mouth daily      lisinopril (ZESTRIL) 5 mg tablet Take 5 mg by mouth daily      loperamide (IMODIUM) 2 mg capsule Take 2 capsules (4 mg total) by mouth 4 (four) times a day as needed for diarrhea 720 capsule 3    metFORMIN (GLUCOPHAGE) 500 mg tablet Take 500 mg by mouth 2 (two) times a day with meals      Multiple Vitamins-Minerals (Ocutabs-Lutein) TABS Take 1 tablet by mouth in the morning  0    nystatin (MYCOSTATIN) cream Apply topically 2 (two) times a day 30 g 1    pantoprazole (PROTONIX) 40 mg tablet       rifampin (RIFADIN) 300 mg capsule Take 2 capsules (600 mg total) by mouth daily Take together with Atorvastatin 60 capsule 0    ustekinumab (Stelara) 90 mg/mL subcutaneous injection Inject 1 prefilled syringe (90 mg total) under the skin once every 28 days. 90 mL 5    Zinc 50 MG CAPS Take by mouth       No current facility-administered medications for this visit.        Allergies   Allergen Reactions    Brimonidine      Red eyes    Mixed Ragweed Allergic Rhinitis    Tramadol Nausea Only       Video Exam  Exam limited due to virtual visit     There were no vitals filed for this visit.    Unable to perform physical exam as patient unable to work camera.      Labs, Imaging, & Other studies:   All pertinent labs and imaging studies were personally reviewed by me from 3/14.    The following portions of the patient's history were reviewed and updated as appropriate: allergies, current medications, past family history, past medical history, past social history, past surgical history and problem list.    I spent 30 minutes with patient today in which greater than 50% of the time was spent in counseling/coordination of care regarding plan to finish latent TB treatment next week, review of recent labs, counseling on medication administration, answered questions to the best of my ability.

## 2024-03-26 ENCOUNTER — TELEPHONE (OUTPATIENT)
Dept: INFECTIOUS DISEASES | Facility: CLINIC | Age: 74
End: 2024-03-26

## 2024-03-26 NOTE — TELEPHONE ENCOUNTER
Pt calls in and states that she would like to set up Lyft ride for her upcoming appt. Informed her there is one already set up planning to arrive at her home at 10:40. Pt verbalizes understanding and gives thanks

## 2024-03-28 ENCOUNTER — TELEMEDICINE (OUTPATIENT)
Dept: INFECTIOUS DISEASES | Facility: CLINIC | Age: 74
End: 2024-03-28
Payer: COMMERCIAL

## 2024-03-28 ENCOUNTER — TELEPHONE (OUTPATIENT)
Age: 74
End: 2024-03-28

## 2024-03-28 ENCOUNTER — TELEPHONE (OUTPATIENT)
Dept: INFECTIOUS DISEASES | Facility: CLINIC | Age: 74
End: 2024-03-28

## 2024-03-28 DIAGNOSIS — Z22.7 TB LUNG, LATENT: Primary | ICD-10-CM

## 2024-03-28 DIAGNOSIS — C90.00 MULTIPLE MYELOMA NOT HAVING ACHIEVED REMISSION (HCC): ICD-10-CM

## 2024-03-28 DIAGNOSIS — K50.018 CROHN'S DISEASE OF SMALL INTESTINE WITH OTHER COMPLICATION (HCC): ICD-10-CM

## 2024-03-28 PROCEDURE — G2211 COMPLEX E/M VISIT ADD ON: HCPCS | Performed by: PHYSICIAN ASSISTANT

## 2024-03-28 PROCEDURE — 99213 OFFICE O/P EST LOW 20 MIN: CPT | Performed by: PHYSICIAN ASSISTANT

## 2024-03-28 NOTE — TELEPHONE ENCOUNTER
Pt called stating [facility] did not call to confirm her pickup and did not show up. Pt called [facility], supposed to be there at 1040, pt downstairs still waiting, currently 1111. Office notified.

## 2024-05-06 DIAGNOSIS — R19.7 DIARRHEA, UNSPECIFIED TYPE: ICD-10-CM

## 2024-05-06 NOTE — TELEPHONE ENCOUNTER
Reason for call:   [x] Refill   [] Prior Auth  [] Other:     Office:   [] PCP/Provider -   [x] Specialty/Provider -  Shara Park PA-C    Medication: diphenoxylate-atropine (LOMOTIL) 2.5-0.025 mg per tablet [     Dose/Frequency: Take 1 tablet by mouth 4 (four) times a day as needed for diarrhea     Quantity: 180    Pharmacy: [facility] - [facility] - [address] [place] [address] Rd [phone]     Does the patient have enough for 3 days?   [x] Yes   [] No - Send as HP to POD

## 2024-05-07 RX ORDER — DIPHENOXYLATE HYDROCHLORIDE AND ATROPINE SULFATE 2.5; .025 MG/1; MG/1
1 TABLET ORAL 4 TIMES DAILY PRN
Qty: 360 TABLET | Refills: 1 | Status: SHIPPED | OUTPATIENT
Start: 2024-05-07

## 2024-05-20 ENCOUNTER — APPOINTMENT (OUTPATIENT)
Dept: LAB | Facility: HOSPITAL | Age: 74
End: 2024-05-20
Payer: COMMERCIAL

## 2024-05-20 DIAGNOSIS — C90.00 MULTIPLE MYELOMA NOT HAVING ACHIEVED REMISSION (HCC): Primary | ICD-10-CM

## 2024-05-20 DIAGNOSIS — D47.2 MGUS (MONOCLONAL GAMMOPATHY OF UNKNOWN SIGNIFICANCE): ICD-10-CM

## 2024-05-20 LAB
ALBUMIN SERPL BCP-MCNC: 4.2 G/DL (ref 3.5–5)
ALP SERPL-CCNC: 90 U/L (ref 34–104)
ALT SERPL W P-5'-P-CCNC: 22 U/L (ref 7–52)
ANION GAP SERPL CALCULATED.3IONS-SCNC: 8 MMOL/L (ref 4–13)
AST SERPL W P-5'-P-CCNC: 20 U/L (ref 13–39)
BASOPHILS # BLD AUTO: 0.03 THOUSANDS/ÂΜL (ref 0–0.1)
BASOPHILS NFR BLD AUTO: 1 % (ref 0–1)
BILIRUB SERPL-MCNC: 0.65 MG/DL (ref 0.2–1)
BUN SERPL-MCNC: 17 MG/DL (ref 5–25)
CALCIUM SERPL-MCNC: 9.7 MG/DL (ref 8.4–10.2)
CHLORIDE SERPL-SCNC: 99 MMOL/L (ref 96–108)
CO2 SERPL-SCNC: 31 MMOL/L (ref 21–32)
CREAT SERPL-MCNC: 0.8 MG/DL (ref 0.6–1.3)
EOSINOPHIL # BLD AUTO: 0.06 THOUSAND/ÂΜL (ref 0–0.61)
EOSINOPHIL NFR BLD AUTO: 1 % (ref 0–6)
ERYTHROCYTE [DISTWIDTH] IN BLOOD BY AUTOMATED COUNT: 13.7 % (ref 11.6–15.1)
GFR SERPL CREATININE-BSD FRML MDRD: 74 ML/MIN/1.73SQ M
GLUCOSE P FAST SERPL-MCNC: 101 MG/DL (ref 65–99)
HCT VFR BLD AUTO: 42.6 % (ref 34.8–46.1)
HGB BLD-MCNC: 13.7 G/DL (ref 11.5–15.4)
IGA SERPL-MCNC: 157 MG/DL (ref 66–433)
IGG SERPL-MCNC: 1829 MG/DL (ref 635–1741)
IGM SERPL-MCNC: 141 MG/DL (ref 45–281)
IMM GRANULOCYTES # BLD AUTO: 0.01 THOUSAND/UL (ref 0–0.2)
IMM GRANULOCYTES NFR BLD AUTO: 0 % (ref 0–2)
LYMPHOCYTES # BLD AUTO: 1.76 THOUSANDS/ÂΜL (ref 0.6–4.47)
LYMPHOCYTES NFR BLD AUTO: 42 % (ref 14–44)
MCH RBC QN AUTO: 29 PG (ref 26.8–34.3)
MCHC RBC AUTO-ENTMCNC: 32.2 G/DL (ref 31.4–37.4)
MCV RBC AUTO: 90 FL (ref 82–98)
MONOCYTES # BLD AUTO: 0.43 THOUSAND/ÂΜL (ref 0.17–1.22)
MONOCYTES NFR BLD AUTO: 10 % (ref 4–12)
NEUTROPHILS # BLD AUTO: 1.95 THOUSANDS/ÂΜL (ref 1.85–7.62)
NEUTS SEG NFR BLD AUTO: 46 % (ref 43–75)
NRBC BLD AUTO-RTO: 0 /100 WBCS
PLATELET # BLD AUTO: 212 THOUSANDS/UL (ref 149–390)
PMV BLD AUTO: 12.6 FL (ref 8.9–12.7)
POTASSIUM SERPL-SCNC: 3.9 MMOL/L (ref 3.5–5.3)
PROT SERPL-MCNC: 8.3 G/DL (ref 6.4–8.4)
RBC # BLD AUTO: 4.72 MILLION/UL (ref 3.81–5.12)
SODIUM SERPL-SCNC: 138 MMOL/L (ref 135–147)
WBC # BLD AUTO: 4.24 THOUSAND/UL (ref 4.31–10.16)

## 2024-05-20 PROCEDURE — 85025 COMPLETE CBC W/AUTO DIFF WBC: CPT

## 2024-05-20 PROCEDURE — 84166 PROTEIN E-PHORESIS/URINE/CSF: CPT

## 2024-05-20 PROCEDURE — 86334 IMMUNOFIX E-PHORESIS SERUM: CPT

## 2024-05-20 PROCEDURE — 82784 ASSAY IGA/IGD/IGG/IGM EACH: CPT

## 2024-05-20 PROCEDURE — 36415 COLL VENOUS BLD VENIPUNCTURE: CPT

## 2024-05-20 PROCEDURE — 83521 IG LIGHT CHAINS FREE EACH: CPT

## 2024-05-20 PROCEDURE — 80053 COMPREHEN METABOLIC PANEL: CPT

## 2024-05-20 PROCEDURE — 84165 PROTEIN E-PHORESIS SERUM: CPT

## 2024-05-21 LAB
ALBUMIN SERPL ELPH-MCNC: 3.91 G/DL (ref 3.2–5.1)
ALBUMIN SERPL ELPH-MCNC: 48.3 % (ref 48–70)
ALBUMIN UR ELPH-MCNC: 100 %
ALPHA1 GLOB MFR UR ELPH: 0 %
ALPHA1 GLOB SERPL ELPH-MCNC: 0.38 G/DL (ref 0.15–0.47)
ALPHA1 GLOB SERPL ELPH-MCNC: 4.7 % (ref 1.8–7)
ALPHA2 GLOB MFR UR ELPH: 0 %
ALPHA2 GLOB SERPL ELPH-MCNC: 0.91 G/DL (ref 0.42–1.04)
ALPHA2 GLOB SERPL ELPH-MCNC: 11.2 % (ref 5.9–14.9)
B-GLOBULIN MFR UR ELPH: 0 %
BETA GLOB ABNORMAL SERPL ELPH-MCNC: 0.53 G/DL (ref 0.31–0.57)
BETA1 GLOB SERPL ELPH-MCNC: 6.6 % (ref 4.7–7.7)
BETA2 GLOB SERPL ELPH-MCNC: 4.3 % (ref 3.1–7.9)
BETA2+GAMMA GLOB SERPL ELPH-MCNC: 0.35 G/DL (ref 0.2–0.58)
GAMMA GLOB ABNORMAL SERPL ELPH-MCNC: 2.02 G/DL (ref 0.4–1.66)
GAMMA GLOB MFR UR ELPH: 0 %
GAMMA GLOB SERPL ELPH-MCNC: 24.9 % (ref 6.9–22.3)
IGG/ALB SER: 0.93 {RATIO} (ref 1.1–1.8)
INTERPRETATION UR IFE-IMP: NORMAL
KAPPA LC FREE SER-MCNC: 28.1 MG/L (ref 3.3–19.4)
KAPPA LC FREE/LAMBDA FREE SER: 0.83 {RATIO} (ref 0.26–1.65)
LAMBDA LC FREE SERPL-MCNC: 34 MG/L (ref 5.7–26.3)
M PROTEIN 1 MFR SERPL ELPH: 10.2 %
M PROTEIN 1 SERPL ELPH-MCNC: 0.83 G/DL
PROT PATTERN SERPL ELPH-IMP: ABNORMAL
PROT PATTERN UR ELPH-IMP: NORMAL
PROT SERPL-MCNC: 8.1 G/DL (ref 6.4–8.2)
PROT UR-MCNC: 6.4 MG/DL

## 2024-05-21 PROCEDURE — 84166 PROTEIN E-PHORESIS/URINE/CSF: CPT | Performed by: PATHOLOGY

## 2024-05-21 PROCEDURE — 84165 PROTEIN E-PHORESIS SERUM: CPT | Performed by: PATHOLOGY

## 2024-05-21 PROCEDURE — 86334 IMMUNOFIX E-PHORESIS SERUM: CPT | Performed by: PATHOLOGY

## 2024-05-23 ENCOUNTER — TELEPHONE (OUTPATIENT)
Dept: HEMATOLOGY ONCOLOGY | Facility: CLINIC | Age: 74
End: 2024-05-23

## 2024-05-23 NOTE — TELEPHONE ENCOUNTER
Appointment Confirmation   Who are you speaking with? Patient   If it is not the patient, are they listed on an active communication consent form? N/A   Which provider is the appointment scheduled with?  Dr Moraes   When is the appointment scheduled?  Please list date and time 05/28/2024 @ 10AM    At which location is the appointment scheduled to take place? [place]    [facility] On[facility] [address]  [place] [address]    Did caller verbalize understanding of appointment details? Yes       Patient states she needs transportation for this appt.

## 2024-05-28 ENCOUNTER — OFFICE VISIT (OUTPATIENT)
Dept: HEMATOLOGY ONCOLOGY | Facility: CLINIC | Age: 74
End: 2024-05-28
Payer: COMMERCIAL

## 2024-05-28 VITALS
HEIGHT: 62 IN | OXYGEN SATURATION: 98 % | WEIGHT: 116 LBS | BODY MASS INDEX: 21.35 KG/M2 | SYSTOLIC BLOOD PRESSURE: 110 MMHG | TEMPERATURE: 98.2 F | RESPIRATION RATE: 16 BRPM | HEART RATE: 60 BPM | DIASTOLIC BLOOD PRESSURE: 58 MMHG

## 2024-05-28 DIAGNOSIS — D47.2 MGUS (MONOCLONAL GAMMOPATHY OF UNKNOWN SIGNIFICANCE): Primary | ICD-10-CM

## 2024-05-28 DIAGNOSIS — J44.9 CHRONIC OBSTRUCTIVE PULMONARY DISEASE, UNSPECIFIED COPD TYPE (HCC): ICD-10-CM

## 2024-05-28 DIAGNOSIS — K50.018 CROHN'S DISEASE OF SMALL INTESTINE WITH OTHER COMPLICATION (HCC): ICD-10-CM

## 2024-05-28 DIAGNOSIS — E11.69 TYPE 2 DIABETES MELLITUS WITH OTHER SPECIFIED COMPLICATION, UNSPECIFIED WHETHER LONG TERM INSULIN USE (HCC): ICD-10-CM

## 2024-05-28 PROCEDURE — 99214 OFFICE O/P EST MOD 30 MIN: CPT | Performed by: INTERNAL MEDICINE

## 2024-05-28 PROCEDURE — G2211 COMPLEX E/M VISIT ADD ON: HCPCS | Performed by: INTERNAL MEDICINE

## 2024-05-28 NOTE — PROGRESS NOTES
Hematology/Oncology Outpatient Follow- up Note  Allyssa Stanton 73 y.o. female MRN: @ Encounter: [id number]        Date:  5/28/2024        Assessment / Plan:    1 IgG lambda MGUS  2 positive TB Gold test on rifampin improved doing well  3 history of Crohn's disease status post colectomy with J-pouch.  Plan: Patient will come back here in 6 months repeat CBC CMP SPEP serum free light chains and serum immunoglobulins.  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  She is not anemic blood counts are stable.  She is also been treated for latent TB and has done well with that.  She has a history also of Crohn's disease and has had a colectomy and has a J-pouch which is gives her trouble from time to time.  Otherwise no other major problems or complaints.  Feeling well.  No frequent infections.      Interval History: Note from 1/26/2024:  Assessment / Plan:    1 IgG lambda MGUS  2 positive TB Gold test on rifampin  Plan: Patient will continue to have her follow-ups for her labs.  She will come back in 4 months.  She will get a CBC CMP SPEP serum immunoglobulin serum free light chains.  Overall she is stable at this time.  HPI: 73-year-old female with a history of IgG lambda multiple myeloma.  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  Her IgG levels went from 1760 in March 20 23 to 1884 in 1/2024..  Her hemoglobin remained stable at 14 white count normal and platelets are 319,000.  CMP was negative.  Total protein 8.6.  Kappa to lambda ratio was 0.85 IgG kappa was 31.6 IgG lambda 37.1 these were steady and stable.  Otherwise she has been stable and feeling fairly well.  Of note though she has been treated with rifampin for a positive tuberculosis Gold interferon test.  She had a negative chest x-ray and is not symptomatic.  Interval History:        1. Smoldering multiple myeloma (SMM)  This is a 72-year-old female  who presented to our clinic 08/2021 for iron deficiency anemia. also   SPEP M spike was found IgG lambda at 1.03. Bone survey showed no bone lesions in 8/2021.  A bone marrow biopsy was obtained showing smoldering multiple myeloma plasma cell neoplasm 15%. Bone marrow aspirate:   Megakaryopoiesis: Normal maturation   Erythropoiesis: Normal maturation        Iron Content (aspirate): No ring sideroblast identified  Granulopoiesis: Normal maturation   Lymphocytes: Normal number and morphology   Plasma cells: Present, most with mature cytology   Biopsy and clot section:   Cellularity: 40%, normocellular for age  Iron content (biopsy and clot section): Adequate  Reticulin stain: No reticulin fibrosis (0 of 3 by the  Consensus grading scheme)  Immunohistochemical stains and JOY were performed with appropriate controls and show the following results:  -   highlights scattered plasma cells, which appear 15% Lambda restricted plasma cells, negative for Kappa.   -  Scattered CD20 positive B lymphocytes and CD3 positive T lymphocytes She is currently on observation.  She had a CT myeloma scan 11/2022 which was unrevealing.         Cancer Staging:  Cancer Staging   No matching staging information was found for the patient.      Molecular Testing:     Previous Hematologic/ Oncologic History:    Oncology History    No history exists.       Current Hematologic/ Oncologic Treatment:       Cycle 1         Test Results:    Imaging: DEXA SCAN/BONE MINERAL AXIAL    Result Date: 5/3/2024  Narrative: Radiology, Mohan Schaferono    DXA Performed: 5/2/24    DXA Resulted: 5/3/2024    Allyssa Stanton                                      Medical Record # [id number] _________________________________ Reason for testing:    estrogen deficient woman at clinical risk Osteoporosis treatment (per questionnaire): A. Previous treatment:    NONE B. Current treatment:    NONE Major risk factors:    rheumatoid arthritis Using a my[employer] Unit  PA images of the lumbar spine, left hip were obtained using DXA.. The bone mineral density and T scores [standard, young, normal, female population] are as follows: _________________________________ RESULTS: Lumbar spine: 1.069 gms/cm2    T-score:  +0.2 Left femoral neck: 0.829 gms/cm2    T-score:  -0.2 Left total hip: 0.827 gms/cm2    T-score:  -0.9 TRABECULAR BONE SCORE:  1.282; partially degraded microarchitecture in comparison to reference group Using the NOF/WHO FRAX calculator, the 10 year absolute risk for any major osteoporotic fracture is 6.5 % and the risk for hip fracture is 0.5 %. _________________________________    Impression: S: Fracture risk is based on current National Osteoporosis Foundation (www.nof.org) Clinicians Guide and American Association of Clinical Endocrinology (AACE) Guidelines (www.aace.com) and the application of current WHO FRAX tool (https://www.elaina.ac.uk/FRAX/) as well as the 2017 American College of Rheumatology Glucocorticoid Induced Osteoporosis (GIOP) Guidelines (rheumatology.org/Practice-Quality/Clinical-Support/Clinical-Practice-Guide lines) using bone mineral density derived T-scores and clinical risk factors obtained from the patient questionnaire.    1. The fracture risk is LOW/MODERATE (does not meet NOF criteria for treatment) 2. The quality of the examination is GOOD. 3. Compared to the study done on 12/7/2021, the following measurements, % change, and significance are: Lumbar spine: no significant change Left total hip}: no significant change _________________________________ SUGGESTIONS:   Information concerning the evaluation and treatment of osteoporosis can be found at the National Osteoporosis Foundation website (www.nof.org) and American Association of Clinical Endocrinology (AACE-www.aace.com).   Osteoporosis prevention and treatment begins by modifying risk factors (such as smoking cessation and avoiding alcohol excess) and by participating in  weight-bearing activities and exercise.  Issues related to fall prevention and home safety should be addressed.  Current NOF guidelines suggest 1200 to 1500 mg of calcium from diet and or supplemental sources.  It is generally felt best to get calcium from one’s diet.  Calcium carbonate and calcium citrate are common calcium supplement choices in most local pharmacies.  If the patient is taking a proton pump inhibitor, then calcium citrate should be the preferred supplement, if that is necessary.  NOF guidelines for vitamin D are 800 to 1000 units of vitamin D3 daily.  However, this may best be guided by measurement of 25-OH vitamin-D level, aiming for a level between 30 to 50 units (ng/ml).   Additional information can be found at the FRAX website (https://www.elaina.ac.uk/FRAX/), and the American College of Rheumatology website (https://www.rheumatology.org/Practice-Quality/Clinical-Support/Clinical-Pr  actice-Guidelines). 1. Does not meet threshold for additional medication at this time.   All treatment decisions require clinical judgment and consideration of individual factors including patient preferences, comorbidities, prior drug use, risk factors not captured in the FRAX model (e.g. sarcopenia, falls, vitamin D deficiency, increased bone turnover, interval significant decline in bone density) and possible under or over estimation of fracture risk by FRAX. A repeat study should be considered in 3 years Astrid Ramsey MD [employer] Certified Clinical  Department of Rheumatology [facility]            Labs:   Lab Results   Component Value Date    WBC 4.24 (L) 05/20/2024    HGB 13.7 05/20/2024    HCT 42.6 05/20/2024    MCV 90 05/20/2024     05/20/2024     Lab Results   Component Value Date    K 3.9 05/20/2024    CL 99 05/20/2024    CO2 31 05/20/2024    BUN 17 05/20/2024    CREATININE 0.80 05/20/2024    GLUF 101 (H) 05/20/2024    CALCIUM 9.7 05/20/2024    CORRECTEDCA 10.4 (H)  "03/29/2023    AST 20 05/20/2024    ALT 22 05/20/2024    ALKPHOS 90 05/20/2024    EGFR 74 05/20/2024         Lab Results   Component Value Date    SPEP See Comment 05/20/2024    UPEP See Comment 05/20/2024       No results found for: \"PSA\"    No results found for: \"CEA\"    No results found for: \"\"    No results found for: \"AFP\"    Lab Results   Component Value Date    IRON 56 10/03/2023    TIBC 447 10/03/2023    FERRITIN 39 10/03/2023       Lab Results   Component Value Date    [id number] 920 (H) 10/03/2023         ROS: Review of Systems   Constitutional: Negative.    HENT: Negative.     Eyes: Negative.    Respiratory: Negative.     Cardiovascular: Negative.    Gastrointestinal: Negative.    Endocrine: Negative.    Genitourinary: Negative.    Musculoskeletal: Negative.    Skin: Negative.    Allergic/Immunologic: Negative.    Neurological: Negative.    Hematological: Negative.          Current Medications: Reviewed  Allergies: Reviewed  PMH/FH/SH:  Reviewed      Physical Exam:    Body surface area is 1.52 meters squared.    Wt Readings from Last 3 Encounters:   05/28/24 52.6 kg (116 lb)   01/25/24 54.4 kg (120 lb)   12/28/23 52.6 kg (116 lb)        Temp Readings from Last 3 Encounters:   05/28/24 98.2 °F (36.8 °C) (Temporal)   02/01/24 (!) 96.8 °F (36 °C)   01/25/24 97.6 °F (36.4 °C) (Temporal)        BP Readings from Last 3 Encounters:   05/28/24 110/58   02/01/24 118/60   01/25/24 118/72         Pulse Readings from Last 3 Encounters:   05/28/24 60   02/01/24 64   01/25/24 82     @LASTSAO2(3)@      Physical Exam  Constitutional:       Appearance: Normal appearance. She is normal weight.   HENT:      Head: Normocephalic and atraumatic.   Eyes:      Extraocular Movements: Extraocular movements intact.      Conjunctiva/sclera: Conjunctivae normal.      Pupils: Pupils are equal, round, and reactive to light.   Cardiovascular:      Rate and Rhythm: Normal rate and regular rhythm.   Pulmonary:      Effort: Pulmonary " effort is normal.      Breath sounds: Normal breath sounds.   Abdominal:      General: Abdomen is flat. Bowel sounds are normal.      Palpations: Abdomen is soft.   Musculoskeletal:         General: Normal range of motion.      Cervical back: Normal range of motion and neck supple.   Skin:     General: Skin is warm and dry.   Neurological:      General: No focal deficit present.      Mental Status: She is alert and oriented to person, place, and time. Mental status is at baseline.     No point tenderness on abdominal exam.      Goals and Barriers:  Current Goal: Prolong Survival from Cancer.   Barriers: None.      Patient's Capacity to Self Care:  Patient is able to self care.    Code Status: [unfilled]  Advance Directive and Living Will:      Power of :

## 2024-06-13 DIAGNOSIS — K50.018 CROHN'S DISEASE OF SMALL INTESTINE WITH OTHER COMPLICATION (HCC): ICD-10-CM

## 2024-06-13 RX ORDER — USTEKINUMAB 90 MG/ML
INJECTION, SOLUTION SUBCUTANEOUS
Qty: 90 ML | Refills: 5 | Status: SHIPPED | OUTPATIENT
Start: 2024-06-13

## 2024-07-16 DIAGNOSIS — B49 FUNGAL INFECTION: ICD-10-CM

## 2024-07-16 RX ORDER — NYSTATIN 100000 U/G
CREAM TOPICAL 2 TIMES DAILY
Qty: 30 G | Refills: 1 | Status: SHIPPED | OUTPATIENT
Start: 2024-07-16

## 2024-10-10 ENCOUNTER — APPOINTMENT (OUTPATIENT)
Dept: LAB | Facility: HOSPITAL | Age: 74
End: 2024-10-10
Payer: COMMERCIAL

## 2024-10-10 DIAGNOSIS — E11.9 DIABETES MELLITUS WITHOUT COMPLICATION (HCC): ICD-10-CM

## 2024-10-10 DIAGNOSIS — E03.9 ACQUIRED HYPOTHYROIDISM: ICD-10-CM

## 2024-10-10 DIAGNOSIS — D47.2 MGUS (MONOCLONAL GAMMOPATHY OF UNKNOWN SIGNIFICANCE): ICD-10-CM

## 2024-10-10 DIAGNOSIS — K50.018 CROHN'S DISEASE OF SMALL INTESTINE WITH OTHER COMPLICATION (HCC): ICD-10-CM

## 2024-10-10 DIAGNOSIS — I10 HYPERTENSION GOAL BP (BLOOD PRESSURE) < 140/90: ICD-10-CM

## 2024-10-10 LAB
ALBUMIN SERPL BCG-MCNC: 4 G/DL (ref 3.5–5)
ALP SERPL-CCNC: 95 U/L (ref 34–104)
ALT SERPL W P-5'-P-CCNC: 20 U/L (ref 7–52)
ANION GAP SERPL CALCULATED.3IONS-SCNC: 6 MMOL/L (ref 4–13)
AST SERPL W P-5'-P-CCNC: 21 U/L (ref 13–39)
BILIRUB SERPL-MCNC: 0.66 MG/DL (ref 0.2–1)
BUN SERPL-MCNC: 13 MG/DL (ref 5–25)
CALCIUM SERPL-MCNC: 9.7 MG/DL (ref 8.4–10.2)
CHLORIDE SERPL-SCNC: 102 MMOL/L (ref 96–108)
CHOLEST SERPL-MCNC: 191 MG/DL
CO2 SERPL-SCNC: 30 MMOL/L (ref 21–32)
CREAT SERPL-MCNC: 0.8 MG/DL (ref 0.6–1.3)
ERYTHROCYTE [DISTWIDTH] IN BLOOD BY AUTOMATED COUNT: 13.8 % (ref 11.6–15.1)
EST. AVERAGE GLUCOSE BLD GHB EST-MCNC: 137 MG/DL
GFR SERPL CREATININE-BSD FRML MDRD: 74 ML/MIN/1.73SQ M
GLUCOSE P FAST SERPL-MCNC: 97 MG/DL (ref 65–99)
HBA1C MFR BLD: 6.4 %
HCT VFR BLD AUTO: 41.5 % (ref 34.8–46.1)
HDLC SERPL-MCNC: 126 MG/DL
HGB BLD-MCNC: 13 G/DL (ref 11.5–15.4)
LDLC SERPL CALC-MCNC: 48 MG/DL (ref 0–100)
MCH RBC QN AUTO: 28.4 PG (ref 26.8–34.3)
MCHC RBC AUTO-ENTMCNC: 31.3 G/DL (ref 31.4–37.4)
MCV RBC AUTO: 91 FL (ref 82–98)
NONHDLC SERPL-MCNC: 65 MG/DL
PLATELET # BLD AUTO: 257 THOUSANDS/UL (ref 149–390)
PMV BLD AUTO: 11.6 FL (ref 8.9–12.7)
POTASSIUM SERPL-SCNC: 3.9 MMOL/L (ref 3.5–5.3)
PROT SERPL-MCNC: 7.7 G/DL (ref 6.4–8.4)
RBC # BLD AUTO: 4.57 MILLION/UL (ref 3.81–5.12)
SODIUM SERPL-SCNC: 138 MMOL/L (ref 135–147)
TRIGL SERPL-MCNC: 86 MG/DL
TSH SERPL DL<=0.05 MIU/L-ACNC: 0.03 UIU/ML (ref 0.45–4.5)
WBC # BLD AUTO: 4.86 THOUSAND/UL (ref 4.31–10.16)

## 2024-10-10 PROCEDURE — 83036 HEMOGLOBIN GLYCOSYLATED A1C: CPT

## 2024-10-10 PROCEDURE — 80061 LIPID PANEL: CPT

## 2024-10-10 PROCEDURE — 85027 COMPLETE CBC AUTOMATED: CPT

## 2024-10-10 PROCEDURE — 80053 COMPREHEN METABOLIC PANEL: CPT

## 2024-10-10 PROCEDURE — 84439 ASSAY OF FREE THYROXINE: CPT

## 2024-10-10 PROCEDURE — 36415 COLL VENOUS BLD VENIPUNCTURE: CPT

## 2024-10-10 PROCEDURE — 84443 ASSAY THYROID STIM HORMONE: CPT

## 2024-10-11 LAB — T4 FREE SERPL-MCNC: 1.16 NG/DL (ref 0.61–1.12)

## 2024-10-24 DIAGNOSIS — B49 FUNGAL INFECTION: ICD-10-CM

## 2024-10-24 DIAGNOSIS — R19.7 DIARRHEA, UNSPECIFIED TYPE: ICD-10-CM

## 2024-10-24 NOTE — TELEPHONE ENCOUNTER
Reason for call:   [x] Refill   [] Prior Auth  [] Other:     Office:   [] PCP/Provider -   [x] Specialty/Provider - GI    Medication: diphenoxylate-atropine (LOMOTIL) 2.5-0.025 mg per tablet     Dose/Frequency: Take 1 tablet by mouth 4 (four) times a day as needed for diarrhea,     Quantity:  360 tablet     Medication: loperamide (IMODIUM) 2 mg capsule     Dose/Frequency:  Take 2 capsules (4 mg total) by mouth 4 (four) times a day as needed for diarrhea     Quantity: 720 capsule     Medication: nystatin (MYCOSTATIN) cream     Dose/Frequency: Apply topically 2 (two) times a day,     Quantity:  30 g     Pharmacy: [facility] - [facility] - [address] [place] [address]     Does the patient have enough for 3 days?   [x] Yes   [] No - Send as HP to POD

## 2024-11-04 RX ORDER — DIPHENOXYLATE HYDROCHLORIDE AND ATROPINE SULFATE 2.5; .025 MG/1; MG/1
1 TABLET ORAL 4 TIMES DAILY PRN
Qty: 360 TABLET | Refills: 0 | Status: SHIPPED | OUTPATIENT
Start: 2024-11-04

## 2024-11-04 RX ORDER — NYSTATIN 100000 U/G
CREAM TOPICAL 2 TIMES DAILY
Qty: 30 G | Refills: 0 | Status: SHIPPED | OUTPATIENT
Start: 2024-11-04

## 2024-11-04 RX ORDER — LOPERAMIDE HYDROCHLORIDE 2 MG/1
4 CAPSULE ORAL 4 TIMES DAILY PRN
Qty: 720 CAPSULE | Refills: 0 | Status: SHIPPED | OUTPATIENT
Start: 2024-11-04

## 2024-11-18 ENCOUNTER — APPOINTMENT (OUTPATIENT)
Dept: LAB | Facility: HOSPITAL | Age: 74
End: 2024-11-18
Payer: COMMERCIAL

## 2024-11-18 DIAGNOSIS — I51.9 MYXEDEMA HEART DISEASE: Primary | ICD-10-CM

## 2024-11-18 DIAGNOSIS — J44.9 CHRONIC OBSTRUCTIVE PULMONARY DISEASE, UNSPECIFIED COPD TYPE (HCC): ICD-10-CM

## 2024-11-18 DIAGNOSIS — E03.9 MYXEDEMA HEART DISEASE: Primary | ICD-10-CM

## 2024-11-18 DIAGNOSIS — K50.018 CROHN'S DISEASE OF SMALL INTESTINE WITH OTHER COMPLICATION (HCC): ICD-10-CM

## 2024-11-18 DIAGNOSIS — E03.9 ACQUIRED HYPOTHYROIDISM: ICD-10-CM

## 2024-11-18 DIAGNOSIS — D47.2 MGUS (MONOCLONAL GAMMOPATHY OF UNKNOWN SIGNIFICANCE): ICD-10-CM

## 2024-11-18 LAB
ALBUMIN SERPL BCG-MCNC: 4 G/DL (ref 3.5–5)
ALP SERPL-CCNC: 94 U/L (ref 34–104)
ALT SERPL W P-5'-P-CCNC: 34 U/L (ref 7–52)
ANION GAP SERPL CALCULATED.3IONS-SCNC: 5 MMOL/L (ref 4–13)
AST SERPL W P-5'-P-CCNC: 29 U/L (ref 13–39)
BASOPHILS # BLD MANUAL: 0.04 THOUSAND/UL (ref 0–0.1)
BASOPHILS NFR MAR MANUAL: 1 % (ref 0–1)
BILIRUB SERPL-MCNC: 0.56 MG/DL (ref 0.2–1)
BUN SERPL-MCNC: 13 MG/DL (ref 5–25)
CALCIUM SERPL-MCNC: 9.6 MG/DL (ref 8.4–10.2)
CHLORIDE SERPL-SCNC: 102 MMOL/L (ref 96–108)
CO2 SERPL-SCNC: 30 MMOL/L (ref 21–32)
CREAT SERPL-MCNC: 0.86 MG/DL (ref 0.6–1.3)
EOSINOPHIL # BLD MANUAL: 0.04 THOUSAND/UL (ref 0–0.4)
EOSINOPHIL NFR BLD MANUAL: 1 % (ref 0–6)
ERYTHROCYTE [DISTWIDTH] IN BLOOD BY AUTOMATED COUNT: 14 % (ref 11.6–15.1)
GFR SERPL CREATININE-BSD FRML MDRD: 68 ML/MIN/1.73SQ M
GLUCOSE P FAST SERPL-MCNC: 96 MG/DL (ref 65–99)
HCT VFR BLD AUTO: 41.4 % (ref 34.8–46.1)
HGB BLD-MCNC: 13 G/DL (ref 11.5–15.4)
IGA SERPL-MCNC: 142 MG/DL (ref 66–433)
IGG SERPL-MCNC: 1777 MG/DL (ref 635–1741)
IGM SERPL-MCNC: 138 MG/DL (ref 45–281)
LYMPHOCYTES # BLD AUTO: 2.2 THOUSAND/UL (ref 0.6–4.47)
LYMPHOCYTES # BLD AUTO: 50 % (ref 14–44)
MCH RBC QN AUTO: 27.9 PG (ref 26.8–34.3)
MCHC RBC AUTO-ENTMCNC: 31.4 G/DL (ref 31.4–37.4)
MCV RBC AUTO: 89 FL (ref 82–98)
MONOCYTES # BLD AUTO: 0.31 THOUSAND/UL (ref 0–1.22)
MONOCYTES NFR BLD: 7 % (ref 4–12)
NEUTROPHILS # BLD MANUAL: 1.8 THOUSAND/UL (ref 1.85–7.62)
NEUTS SEG NFR BLD AUTO: 41 % (ref 43–75)
PLATELET # BLD AUTO: 236 THOUSANDS/UL (ref 149–390)
PLATELET BLD QL SMEAR: ADEQUATE
PMV BLD AUTO: 11.9 FL (ref 8.9–12.7)
POTASSIUM SERPL-SCNC: 4.1 MMOL/L (ref 3.5–5.3)
PROT SERPL-MCNC: 7.7 G/DL (ref 6.4–8.4)
RBC # BLD AUTO: 4.66 MILLION/UL (ref 3.81–5.12)
RBC MORPH BLD: NORMAL
SODIUM SERPL-SCNC: 137 MMOL/L (ref 135–147)
TSH SERPL DL<=0.05 MIU/L-ACNC: 0.81 UIU/ML (ref 0.45–4.5)
WBC # BLD AUTO: 4.39 THOUSAND/UL (ref 4.31–10.16)

## 2024-11-18 PROCEDURE — 80053 COMPREHEN METABOLIC PANEL: CPT

## 2024-11-18 PROCEDURE — 84443 ASSAY THYROID STIM HORMONE: CPT

## 2024-11-18 PROCEDURE — 84165 PROTEIN E-PHORESIS SERUM: CPT

## 2024-11-18 PROCEDURE — 85007 BL SMEAR W/DIFF WBC COUNT: CPT

## 2024-11-18 PROCEDURE — 85027 COMPLETE CBC AUTOMATED: CPT

## 2024-11-18 PROCEDURE — 83521 IG LIGHT CHAINS FREE EACH: CPT

## 2024-11-18 PROCEDURE — 36415 COLL VENOUS BLD VENIPUNCTURE: CPT

## 2024-11-18 PROCEDURE — 82784 ASSAY IGA/IGD/IGG/IGM EACH: CPT

## 2024-11-19 LAB
KAPPA LC FREE SER-MCNC: 25.9 MG/L (ref 3.3–19.4)
KAPPA LC FREE/LAMBDA FREE SER: 0.92 {RATIO} (ref 0.26–1.65)
LAMBDA LC FREE SERPL-MCNC: 28.3 MG/L (ref 5.7–26.3)

## 2024-11-20 LAB
ALBUMIN SERPL ELPH-MCNC: 3.55 G/DL (ref 3.2–5.1)
ALBUMIN SERPL ELPH-MCNC: 50.7 % (ref 48–70)
ALPHA1 GLOB SERPL ELPH-MCNC: 0.28 G/DL (ref 0.15–0.47)
ALPHA1 GLOB SERPL ELPH-MCNC: 4 % (ref 1.8–7)
ALPHA2 GLOB SERPL ELPH-MCNC: 0.71 G/DL (ref 0.42–1.04)
ALPHA2 GLOB SERPL ELPH-MCNC: 10.1 % (ref 5.9–14.9)
BETA GLOB ABNORMAL SERPL ELPH-MCNC: 0.47 G/DL (ref 0.31–0.57)
BETA1 GLOB SERPL ELPH-MCNC: 6.7 % (ref 4.7–7.7)
BETA2 GLOB SERPL ELPH-MCNC: 4.1 % (ref 3.1–7.9)
BETA2+GAMMA GLOB SERPL ELPH-MCNC: 0.29 G/DL (ref 0.2–0.58)
GAMMA GLOB ABNORMAL SERPL ELPH-MCNC: 1.71 G/DL (ref 0.4–1.66)
GAMMA GLOB SERPL ELPH-MCNC: 24.4 % (ref 6.9–22.3)
IGG/ALB SER: 1.03 {RATIO} (ref 1.1–1.8)
M PROTEIN 1 MFR SERPL ELPH: 11.2 %
M PROTEIN 1 SERPL ELPH-MCNC: 0.78 G/DL
PROT PATTERN SERPL ELPH-IMP: ABNORMAL
PROT SERPL-MCNC: 7 G/DL (ref 6.4–8.2)

## 2024-11-20 PROCEDURE — 84165 PROTEIN E-PHORESIS SERUM: CPT | Performed by: STUDENT IN AN ORGANIZED HEALTH CARE EDUCATION/TRAINING PROGRAM

## 2024-11-26 ENCOUNTER — DOCUMENTATION (OUTPATIENT)
Dept: HEMATOLOGY ONCOLOGY | Facility: CLINIC | Age: 74
End: 2024-11-26

## 2024-12-02 ENCOUNTER — TELEPHONE (OUTPATIENT)
Dept: HEMATOLOGY ONCOLOGY | Facility: CLINIC | Age: 74
End: 2024-12-02

## 2024-12-02 ENCOUNTER — TELEPHONE (OUTPATIENT)
Age: 74
End: 2024-12-02

## 2024-12-02 NOTE — TELEPHONE ENCOUNTER
Patient is calling to confirm that she is set up with [facility] for her appointment on 12/3/24, please call her to confirm.

## 2024-12-03 ENCOUNTER — TELEPHONE (OUTPATIENT)
Age: 74
End: 2024-12-03

## 2024-12-03 ENCOUNTER — OFFICE VISIT (OUTPATIENT)
Dept: HEMATOLOGY ONCOLOGY | Facility: CLINIC | Age: 74
End: 2024-12-03
Payer: COMMERCIAL

## 2024-12-03 VITALS
HEART RATE: 72 BPM | BODY MASS INDEX: 21.25 KG/M2 | TEMPERATURE: 98.2 F | RESPIRATION RATE: 15 BRPM | HEIGHT: 62 IN | OXYGEN SATURATION: 99 % | DIASTOLIC BLOOD PRESSURE: 62 MMHG | SYSTOLIC BLOOD PRESSURE: 102 MMHG | WEIGHT: 115.5 LBS

## 2024-12-03 DIAGNOSIS — D47.2 MGUS (MONOCLONAL GAMMOPATHY OF UNKNOWN SIGNIFICANCE): Primary | ICD-10-CM

## 2024-12-03 PROCEDURE — 99211 OFF/OP EST MAY X REQ PHY/QHP: CPT | Performed by: INTERNAL MEDICINE

## 2024-12-03 RX ORDER — LEVOTHYROXINE SODIUM 88 UG/1
TABLET ORAL
COMMUNITY
Start: 2024-10-15

## 2024-12-03 RX ORDER — MOMETASONE FUROATE MONOHYDRATE 50 UG/1
SPRAY, METERED NASAL
COMMUNITY
Start: 2024-10-23

## 2024-12-03 RX ORDER — ERYTHROMYCIN 5 MG/G
OINTMENT OPHTHALMIC
COMMUNITY
Start: 2024-10-24

## 2024-12-03 RX ORDER — BACLOFEN 10 MG/1
10 TABLET ORAL 2 TIMES DAILY
COMMUNITY
Start: 2024-10-15

## 2024-12-03 RX ORDER — FLUTICASONE FUROATE, UMECLIDINIUM BROMIDE AND VILANTEROL TRIFENATATE 100; 62.5; 25 UG/1; UG/1; UG/1
POWDER RESPIRATORY (INHALATION)
COMMUNITY
Start: 2024-08-30

## 2024-12-03 NOTE — PROGRESS NOTES
Allyssa Stanton  1950  [facility] [place]  [address] [facility] [address]    Chief Complaint   Patient presents with    Follow-up     Came for F/U of lab for MGUS       Oncology History    No history exists.       History of Present Illness:  -No ref. provider found:    74-year-old female with a history of Ig G lambda MGUS versus very early smoldering multiple myeloma.  She is not anemic blood counts are stable.  She is also been treated for latent TB and has done well with that.  She has a history also of Crohn's disease and has had a colectomy and has a J-pouch which is gives her trouble from time to time.  Otherwise no other major problems or complaints.  Feeling well.  No frequent infections.         Review of Systems   Constitutional:  Negative for chills and fever.   HENT:  Negative for ear pain and sore throat.    Eyes:  Negative for pain and visual disturbance.   Respiratory:  Negative for cough and shortness of breath.    Cardiovascular:  Negative for chest pain and palpitations.   Gastrointestinal:  Negative for abdominal pain and vomiting.   Genitourinary:  Negative for dysuria and hematuria.   Musculoskeletal:  Negative for arthralgias and back pain.   Skin:  Negative for color change and rash.   Neurological:  Negative for seizures and syncope.   All other systems reviewed and are negative.           Patient Active Problem List   Diagnosis    Asthma    COPD (chronic obstructive pulmonary disease) (HCC)    Type 2 diabetes mellitus with other specified complication, unspecified whether long term insulin use (HCC)    Ocular hypertension    Crohn's disease of small intestine with other complication (HCC)    Low iron    Protein-calorie malnutrition, unspecified severity (HCC)    Gastro-esophageal reflux disease without esophagitis    Hypertension goal BP (blood pressure) < 140/90    Hypothyroid    MGUS (monoclonal gammopathy of unknown  significance)    Primary open angle glaucoma of both eyes, moderate stage    Status post reversal of ileostomy    Crohn's disease (HCC)    COVID-19    Multiple myeloma not having achieved remission (HCC)    Immunodeficiency (HCC)    TB lung, latent     Past Medical History:   Diagnosis Date    Asthma     COPD (chronic obstructive pulmonary disease) (HCC)     COVID     Crohn's disease (HCC)     Diabetes mellitus (HCC)     Hypertension     Multiple myeloma (HCC)     Ocular hypertension      Past Surgical History:   Procedure Laterality Date    CATARACT EXTRACTION      COLON SURGERY      Partial colectomy, partial removal of the rectum    EXPLORATORY LAPAROTOMY      ILEOSTOMY      IR BIOPSY BONE MARROW  10/13/2021     Family History   Problem Relation Age of Onset    Diabetes Mother     Hypertension Mother      Social History     Socioeconomic History    Marital status: Unknown     Spouse name: Not on file    Number of children: Not on file    Years of education: Not on file    Highest education level: Not on file   Occupational History    Not on file   Tobacco Use    Smoking status: Never    Smokeless tobacco: Never   Vaping Use    Vaping status: Never Used   Substance and Sexual Activity    Alcohol use: Never    Drug use: Never    Sexual activity: Not Currently   Other Topics Concern    Not on file   Social History Narrative    Not on file     Social Drivers of Health     Financial Resource Strain: Low Risk  (9/4/2024)    Received from [facility]    Financial Resource Strain     Do you have any trouble paying for your medications, or do you think you might in the future?: No     Does your family have trouble paying for medicine? (Household - for ages 0-17 years): Not on file   Food Insecurity: No Food Insecurity (9/4/2024)    Received from [facility]    Hunger Vital Sign     Worried About Running Out of Food in the Last Year: Never true     Ran Out of Food in the Last Year: Never true   Transportation Needs: No  Transportation Needs (9/4/2024)    Received from [facility]    Transportation Needs     Do you have trouble getting a ride to medical visits or work? (Adult - for ages 18 years and over): Not on file     Does your family have a hard time getting a ride to doctors’ visits? (Household - for ages 0-17 years): Not on file     Has lack of transportation kept you from medical appointments, meetings, work, or from getting things needed for daily living? Check all that apply.: No     Do you (or your family) have trouble finding or paying for a ride (transportation)? (Household - for ages 0-17 years): Not on file   Physical Activity: Not on file   Stress: Not on file   Social Connections: Socially Integrated (9/4/2024)    Received from [facility]    Social Connections     How often do you feel lonely or isolated from those around you?: Never   Intimate Partner Violence: Not on file   Housing Stability: Low Risk  (9/4/2024)    Received from [facility]    Housing Stability     Do you currently live in a shelter or have no steady place to sleep at night?: No     Do you think you are at risk of becoming homeless? (Adult - for ages 18 years and over): Not on file     Does your family worry about paying for your home or becoming homeless? (Household - for ages 0-17 years): Not on file     Are you homeless or worried that you might be in the future?: No     Are you (or your family) homeless or worried that you might be in the future? (Household - for ages 0-17 years): Not on file       Current Outpatient Medications:     albuterol (PROVENTIL HFA,VENTOLIN HFA) 90 mcg/act inhaler, Inhale, Disp: , Rfl:     Ascorbic Acid (Vitamin C) 500 MG CAPS, Take 1 capsule by mouth in the morning, Disp: , Rfl:     atorvastatin (LIPITOR) 10 mg tablet, Take 10 mg by mouth daily, Disp: , Rfl:     baclofen 10 mg tablet, Take 10 mg by mouth 2 (two) times a day, Disp: , Rfl:     Cyanocobalamin (B-12) 5000 MCG CAPS, Take 1 capsule by mouth in the morning,  Disp: , Rfl:     dextromethorphan-guaiFENesin (ROBITUSSIN DM)  mg/5 mL syrup, Take 10 mL by mouth every 4 (four) hours as needed for cough, Disp: 118 mL, Rfl: 0    diphenoxylate-atropine (LOMOTIL) 2.5-0.025 mg per tablet, Take 1 tablet by mouth 4 (four) times a day as needed for diarrhea, Disp: 360 tablet, Rfl: 0    erythromycin (ILOTYCIN) ophthalmic ointment, , Disp: , Rfl:     ferrous sulfate 325 (65 FE) MG EC tablet, Take 325 mg by mouth 3 (three) times a day with meals, Disp: , Rfl:     fluticasone (FLONASE) 50 mcg/act nasal spray, 1 spray daily, Disp: , Rfl:     Fluticasone Furoate-Vilanterol 100-25 mcg/actuation inhaler, Inhale 1 puff daily Rinse mouth after use., Disp: , Rfl:     hydrochlorothiazide (HYDRODIURIL) 25 mg tablet, Take 25 mg by mouth daily, Disp: , Rfl:     ketoconazole (NIZORAL) 2 % cream, Apply topically 2 (two) times a day, Disp: , Rfl:     levothyroxine 88 mcg tablet, , Disp: , Rfl:     lisinopril (ZESTRIL) 5 mg tablet, Take 5 mg by mouth daily, Disp: , Rfl:     loperamide (IMODIUM) 2 mg capsule, Take 2 capsules (4 mg total) by mouth 4 (four) times a day as needed for diarrhea, Disp: 720 capsule, Rfl: 0    metFORMIN (GLUCOPHAGE) 500 mg tablet, Take 500 mg by mouth 2 (two) times a day with meals, Disp: , Rfl:     mometasone (NASONEX) 50 mcg/act nasal spray, , Disp: , Rfl:     Multiple Vitamins-Minerals (Ocutabs-Lutein) TABS, Take 1 tablet by mouth in the morning, Disp: , Rfl: 0    nystatin (MYCOSTATIN) cream, Apply topically 2 (two) times a day, Disp: 30 g, Rfl: 0    pantoprazole (PROTONIX) 40 mg tablet, , Disp: , Rfl:     Trelegy Ellipta 100-62.5-25 MCG/ACT inhaler, , Disp: , Rfl:     ustekinumab (Stelara) 90 mg/mL subcutaneous injection, Inject 1 prefilled syringe (90 mg total) under the skin once every 28 days., Disp: 90 mL, Rfl: 5    Banana Flakes (Banatrol plus) PACK, Take 1 each by mouth 2 (two) times a day (Patient not taking: Reported on 11/30/2023), Disp: 75 each, Rfl: 1     "dorzolamide-timolol (COSOPT) 22.3-6.8 MG/ML ophthalmic solution, 1 drop 2 times daily- 8 am and 8 pm- both eyes (Patient not taking: Reported on 12/3/2024), Disp: , Rfl:     levothyroxine 125 mcg tablet, Take 125 mcg by mouth daily (Patient not taking: Reported on 12/3/2024), Disp: , Rfl:     rifampin (RIFADIN) 300 mg capsule, Take 2 capsules (600 mg total) by mouth daily Take together with Atorvastatin, Disp: 60 capsule, Rfl: 0    Zinc 50 MG CAPS, Take by mouth (Patient not taking: Reported on 12/3/2024), Disp: , Rfl:   Allergies   Allergen Reactions    Brimonidine      Red eyes    Mixed Ragweed Allergic Rhinitis    Tramadol Nausea Only     Vitals:    12/03/24 0958   BP: 102/62   Pulse: 72   Resp: 15   Temp: 98.2 °F (36.8 °C)   SpO2: 99%         /62 (BP Location: Left arm, Patient Position: Sitting, Cuff Size: Standard)   Pulse 72   Temp 98.2 °F (36.8 °C) (Temporal)   Resp 15   Ht 5' 2\" (1.575 m)   Wt 52.4 kg (115 lb 8 oz)   SpO2 99%   BMI 21.13 kg/m²     General Appearance:    Alert, cooperative, no distress, appears stated age   Head:    Normocephalic, without obvious abnormality, atraumatic   Eyes:    PERRL, conjunctiva/corneas clear, EOM's intact, fundi     benign, both eyes        Ears:    Normal TM's and external ear canals, both ears   Nose:   Nares normal, septum midline, mucosa normal, no drainage    or sinus tenderness   Throat:   Lips, mucosa, and tongue normal; teeth and gums normal   Neck:   Supple, symmetrical, trachea midline, no adenopathy;        thyroid:  No enlargement/tenderness/nodules; no carotid    bruit or JVD   Back:     Symmetric, no curvature, ROM normal, no CVA tenderness   Lungs:     Clear to auscultation bilaterally, respirations unlabored   Chest wall:    No tenderness or deformity   Heart:    Regular rate and rhythm, S1 and S2 normal, no murmur, rub    or gallop   Abdomen:     Soft, non-tender, bowel sounds active all four quadrants,     no masses, no organomegaly        "    Extremities:   Extremities normal, atraumatic, no cyanosis or edema   Pulses:   2+ and symmetric all extremities   Skin:   Skin color, texture, turgor normal, no rashes or lesions   Lymph nodes:   Cervical, supraclavicular, and axillary nodes normal   Neurologic:   CNII-XII intact. Normal strength, sensation and reflexes       throughout          Performance Status: ECOG/Zubrod/WHO: 0 - Asymptomatic    Labs:  Immunoglobulin free LT chains blood  Order: [id number]   Status: Final result       Next appt: 02/24/2025 at 09:40 AM in Gastroenterology (Erlinda Myrick MD)       Dx: Myxedema heart disease; Acquired hypo...    Test Result Released: No (inaccessible in [facility])    0 Result Notes            Component  Ref Range & Units (hover) 11/18/24 10:22 AM 5/20/24  9:03 AM 1/17/24 10:50 AM 3/29/23 10:06 AM 10/5/22  7:28 AM 3/29/22 10:02 AM 8/27/21 11:51 AM   Ig [place] Free Light Chain 25.9 High  28.1 High  31.6 High  29.4 High  27.8 High  28.8 High  31.1 High    Ig Lambda Free Light Chain 28.3 High  34.0 High  37.1 High  36.7 High  36.9 High  40.5 High  39.9 High    Kappa/Lambda FluidC Ratio 0.92 0.83 0.85 0.80 0.75 0.71 0.78           Result Notes            Component  Ref Range & Units (hover) 11/18/24 10:22 AM 11/18/24 10:22 AM 10/10/24  9:02 AM 5/20/24  9:03 AM 5/20/24  9:03 AM 3/14/24 10:50 AM 1/17/24 10:50 AM   A/G Ratio 1.03 Low    0.93 Low       Albumin Electrophoresis 50.7   48.3      Albumin CONC 3.55   3.91      Alpha 1 4.0   4.7      ALPHA 1 CONC 0.28   0.38      Alpha 2 10.1   11.2      ALPHA 2 CONC 0.71   0.91      Beta-1 6.7   6.6      BETA 1 CONC 0.47   0.53      Beta-2 4.1   4.3      BETA 2 CONC 0.29   0.35      Gamma Globulin 24.4 High    24.9 High       GAMMA CONC 1.71 High    2.02 High       M Peak ID 1 11.20   10.20      M PEAK 1 CONC 0.78   0.83      Total Protein 7.0 7.7 R 7.7 R 8.1 8.3 R 8.1 R       ntains abnormal data IgG, IgA, IgM  Order: [id number]   Status: Final result        Next appt: 02/24/2025 at 09:40 AM in Gastroenterology (Erlinda Myrick MD)       Dx: MGUS (monoclonal gammopathy of unknow...    Test Result Released: No (inaccessible in [facility])    0 Result Notes         Component  Ref Range & Units (hover) 11/18/24 10:22 AM 5/20/24  9:03 AM 1/17/24 10:50 AM 3/29/23 10:06 AM    157 162 152.0 R   IGG 1,777 High  1,829 High  1,884 High  1,760.0 High  R    141 140 124.0 R          Discussion/Summary:    1 IgG lambda MGUS  2 positive TB Gold test on rifampin improved doing well  3 history of Crohn's disease status post colectomy with J-pouch.  Plan: Patient will come back here in 6 months repeat CBC CMP SPEP serum free light chains and serum immunoglobulins.  Doing well no other major suggestions      F/U in 6 months       García Randall MD

## 2024-12-03 NOTE — TELEPHONE ENCOUNTER
Pt called in stating that she will be late to her appointment as [facility] will not be picking her up on time.

## 2025-01-20 DIAGNOSIS — K50.018 CROHN'S DISEASE OF SMALL INTESTINE WITH OTHER COMPLICATION (HCC): ICD-10-CM

## 2025-01-20 RX ORDER — USTEKINUMAB 90 MG/ML
INJECTION, SOLUTION SUBCUTANEOUS
Qty: 1 ML | Refills: 10 | Status: SHIPPED | OUTPATIENT
Start: 2025-01-20

## 2025-02-03 ENCOUNTER — TELEPHONE (OUTPATIENT)
Age: 75
End: 2025-02-03

## 2025-02-03 NOTE — TELEPHONE ENCOUNTER
Pt called in to advise she was speaking with someone previous [facility] and the previous person asked her to call in with further ID information. Looks like the insurance was already added and updated.

## 2025-02-05 NOTE — TELEPHONE ENCOUNTER
Patients GI provider:  Dr. Myrick    Number to return call: [phone]    Reason for call: Pt called in stating she received a letter from insurance regarding Stelara. The letter states there needs to be a prior auth to determined coverage. Patient provided insurance number [id number] ([employer]) Please reach out to patient, thank you.    Scheduled procedure/appointment date if applicable: Apt/procedure 02/24/2025

## 2025-02-10 DIAGNOSIS — K50.018 CROHN'S DISEASE OF SMALL INTESTINE WITH OTHER COMPLICATION (HCC): ICD-10-CM

## 2025-02-10 RX ORDER — USTEKINUMAB 90 MG/ML
90 INJECTION, SOLUTION SUBCUTANEOUS
Qty: 1 ML | Refills: 10 | Status: SHIPPED | OUTPATIENT
Start: 2025-02-10

## 2025-02-10 NOTE — TELEPHONE ENCOUNTER
[place] for Stelara 90mg every 28 days approved.  Please send script to:  [facility] - [address]  [address] [address]  Phone: [phone]  Fax: [phone]

## 2025-02-13 ENCOUNTER — TELEPHONE (OUTPATIENT)
Dept: HEMATOLOGY ONCOLOGY | Facility: CLINIC | Age: 75
End: 2025-02-13

## 2025-02-13 NOTE — TELEPHONE ENCOUNTER
Called pt in regards to upcoming appt   -relayed date and time is still same   -provider will be different  *assured that [facility] transport is still scheduled for appt  Left [facility] number if needed

## 2025-02-19 ENCOUNTER — TELEPHONE (OUTPATIENT)
Dept: GASTROENTEROLOGY | Facility: CLINIC | Age: 75
End: 2025-02-19

## 2025-02-19 ENCOUNTER — TELEPHONE (OUTPATIENT)
Age: 75
End: 2025-02-19

## 2025-02-19 NOTE — TELEPHONE ENCOUNTER
Patients GI provider:  Dr. Myrick    Number to return call: [phone]    Reason for call: Pt calling to request transportation be set up for her appointment    Scheduled procedure/appointment date if applicable: Appt 2/24/25

## 2025-02-21 ENCOUNTER — TELEPHONE (OUTPATIENT)
Dept: GASTROENTEROLOGY | Facility: CLINIC | Age: 75
End: 2025-02-21

## 2025-02-21 NOTE — TELEPHONE ENCOUNTER
Left message for patient that [email or web address] is inactive and we need updated insurance information, to please return call with the information or bring to appointment, and also Deanne was arranged any questions to please call. Verified on Susanna, previously scheduled

## 2025-02-24 ENCOUNTER — OFFICE VISIT (OUTPATIENT)
Dept: GASTROENTEROLOGY | Facility: CLINIC | Age: 75
End: 2025-02-24
Payer: COMMERCIAL

## 2025-02-24 VITALS
WEIGHT: 114.6 LBS | SYSTOLIC BLOOD PRESSURE: 126 MMHG | OXYGEN SATURATION: 99 % | TEMPERATURE: 97.2 F | HEART RATE: 59 BPM | HEIGHT: 62 IN | BODY MASS INDEX: 21.09 KG/M2 | DIASTOLIC BLOOD PRESSURE: 76 MMHG

## 2025-02-24 DIAGNOSIS — E55.9 VITAMIN D DEFICIENCY: ICD-10-CM

## 2025-02-24 DIAGNOSIS — K91.850 POUCHITIS (HCC): ICD-10-CM

## 2025-02-24 DIAGNOSIS — C90.00 MULTIPLE MYELOMA NOT HAVING ACHIEVED REMISSION (HCC): ICD-10-CM

## 2025-02-24 DIAGNOSIS — K50.018 CROHN'S DISEASE OF SMALL INTESTINE WITH OTHER COMPLICATION (HCC): Primary | ICD-10-CM

## 2025-02-24 DIAGNOSIS — E61.1 LOW IRON: ICD-10-CM

## 2025-02-24 DIAGNOSIS — R19.7 DIARRHEA, UNSPECIFIED TYPE: ICD-10-CM

## 2025-02-24 DIAGNOSIS — D84.9 IMMUNOCOMPROMISED PATIENT (HCC): ICD-10-CM

## 2025-02-24 DIAGNOSIS — E53.8 VITAMIN B12 DEFICIENCY: ICD-10-CM

## 2025-02-24 DIAGNOSIS — K21.9 GASTRO-ESOPHAGEAL REFLUX DISEASE WITHOUT ESOPHAGITIS: ICD-10-CM

## 2025-02-24 DIAGNOSIS — E46 PROTEIN-CALORIE MALNUTRITION, UNSPECIFIED SEVERITY (HCC): ICD-10-CM

## 2025-02-24 DIAGNOSIS — R76.12 POSITIVE QUANTIFERON-TB GOLD TEST: ICD-10-CM

## 2025-02-24 DIAGNOSIS — B49 FUNGAL INFECTION: ICD-10-CM

## 2025-02-24 DIAGNOSIS — Z11.59 ENCOUNTER FOR SCREENING FOR OTHER VIRAL DISEASES: ICD-10-CM

## 2025-02-24 DIAGNOSIS — R13.10 DYSPHAGIA, UNSPECIFIED TYPE: ICD-10-CM

## 2025-02-24 PROCEDURE — 99214 OFFICE O/P EST MOD 30 MIN: CPT | Performed by: INTERNAL MEDICINE

## 2025-02-24 PROCEDURE — G2211 COMPLEX E/M VISIT ADD ON: HCPCS | Performed by: INTERNAL MEDICINE

## 2025-02-24 RX ORDER — CARBOXYMETHYLCELLULOSE SODIUM 5 MG/ML
1 SOLUTION/ DROPS OPHTHALMIC 3 TIMES DAILY PRN
COMMUNITY

## 2025-02-24 RX ORDER — PETROLATUM 0.61 G/G
CREAM TOPICAL AS NEEDED
Qty: 99 G | Refills: 1 | Status: SHIPPED | OUTPATIENT
Start: 2025-02-24

## 2025-02-24 RX ORDER — LOPERAMIDE HYDROCHLORIDE 2 MG/1
4 CAPSULE ORAL 4 TIMES DAILY PRN
Qty: 720 CAPSULE | Refills: 0 | Status: SHIPPED | OUTPATIENT
Start: 2025-02-24

## 2025-02-24 RX ORDER — BRINZOLAMIDE 10 MG/ML
1 SUSPENSION/ DROPS OPHTHALMIC 3 TIMES DAILY
COMMUNITY

## 2025-02-24 RX ORDER — MULTIVITAMIN WITH IRON
8000 TABLET ORAL DAILY
COMMUNITY

## 2025-02-24 RX ORDER — NYSTATIN 100000 U/G
CREAM TOPICAL 2 TIMES DAILY
Qty: 30 G | Refills: 0 | Status: SHIPPED | OUTPATIENT
Start: 2025-02-24

## 2025-02-24 RX ORDER — DIPHENOXYLATE HYDROCHLORIDE AND ATROPINE SULFATE 2.5; .025 MG/1; MG/1
1 TABLET ORAL 4 TIMES DAILY PRN
Qty: 360 TABLET | Refills: 0 | Status: SHIPPED | OUTPATIENT
Start: 2025-02-24

## 2025-02-24 RX ORDER — SIMETHICONE 80 MG
80 TABLET,CHEWABLE ORAL EVERY 6 HOURS PRN
COMMUNITY

## 2025-02-24 RX ORDER — OMEGA-3-ACID ETHYL ESTERS 1 G/1
2 CAPSULE, LIQUID FILLED ORAL 2 TIMES DAILY
COMMUNITY

## 2025-02-24 RX ORDER — SODIUM CHLORIDE, SODIUM LACTATE, POTASSIUM CHLORIDE, CALCIUM CHLORIDE 600; 310; 30; 20 MG/100ML; MG/100ML; MG/100ML; MG/100ML
125 INJECTION, SOLUTION INTRAVENOUS CONTINUOUS
OUTPATIENT
Start: 2025-02-24

## 2025-02-24 NOTE — ASSESSMENT & PLAN NOTE
The patient is a 74-year-old woman with smoldering multiple myeloma as well as inflammatory bowel disease that was initially thought to be ulcerative colitis status post proctocolectomy with ileal pouch anal anastomosis in 2007 but complicated by small bowel obstruction in 2019 with strictures in the pouch at the anastomosis and adhesions status post dilation of the stricture complicated by microperforation for which she underwent exploratory laparotomy with lysis of adhesions and creation of a loop ileostomy and eventual ileostomy reversal in 2019 thought to possibly have Crohn's disease versus ischemia of the pouch who now presents for follow-up.  Was previously recommended that she perform manual dilation and that it helped significant improvement.  In the past she had been on Entyvio since September 2020 with some lost to follow-up but ultimately restarted and then again unable to be continued secondary to insurance issues. Most recently she has been on stelara. She now presents for follow-up, last seen in 2022.     Overall feeling much better on Stelara     CT enterography February 2022 with active inflammation of the distal ileum and pouch with strictures.     CT enterography from November 2022 with active IBD with small bowel inflammation and luminal narrowing and stricturing involving the pouch as well as the distal ileum and pouch anastomosis.  No penetrating perianal disease.  The degree of mural hyperenhancement and wall thickening was less severe compared to prior CT enterography.      Last pouchoscopy June 2022 with multiple large superficial irregular ulcers 50 cm from anal verge with 2 areas of narrowing in the pouch as well as ulcerations.     Most recent CMP normal.  Lipid profile normal.  CBC with normal white blood cell count, hemoglobin, MCV, platelets.  TSH normal.  Most recent free T4 prior to that was elevated with low TSH.  Hemoglobin A1c elevated at 6.4 consistent with well-controlled  diabetes.  Most recent Stelara level 7 with no antidrug antibodies.  Negative hepatitis profile as of October 2023 and quant gold positive at that time.        Continue Stelara every 28 days  Next blood work ordered now  Next quant gold and hepatitis panel ordered now  Repeat enterography  Next pouchoscopy ordered today     Continue Banatrol 2 times daily  Can use Imodium or Lomotil as needed to help with constipation, but would try to limit given prior bowel obstruction  Continue manual dilations as needed     Low residue diet (avoid leafy green vegetables, raw fruits and vegetables especially with skin, nuts, seeds, corn/popcorn, high-fiber foods).   Drink at least 8 cups of water per day  High-protein diet  Avoid eating 3 to 4 hours before bedtime     Continue B12 supplementation  Continue iron supplementation with vitamin C    Orders:    CBC and differential; Future    Comprehensive metabolic panel; Future    C-reactive protein; Future    Quantiferon TB Gold Plus Assay; Future    Chronic Hepatitis Panel; Future    Calprotectin,Fecal; Future    Vitamin B12; Future    Vitamin D 25 hydroxy; Future    Iron Panel (Includes Ferritin, Iron Sat%, Iron, and TIBC); Future    MRI enterography w wo; Future    Pouchoscopy; Future    EGD; Future    Skin Protectants, Misc. (eucerin) cream; Apply topically as needed for dry skin

## 2025-02-24 NOTE — ASSESSMENT & PLAN NOTE
Continue pantoprazole  Consider adjusting dose or adding Pepcid  Gaviscon or Tums as needed for breakthrough symptoms  Things that can help improved reflux include: avoidance of trigger foods (potential foods include coffee, caffeine, chocolate, mint, tomato-based products, spicy foods, fatty foods), avoid tight fitting clothing, elevated head of bed 30 degrees, avoid eating 2-3 hours prior to bedtime, weight loss, avoid alcohol, avoid tobacco use.

## 2025-02-24 NOTE — ASSESSMENT & PLAN NOTE
Orders:    diphenoxylate-atropine (LOMOTIL) 2.5-0.025 mg per tablet; Take 1 tablet by mouth 4 (four) times a day as needed for diarrhea    loperamide (IMODIUM) 2 mg capsule; Take 2 capsules (4 mg total) by mouth 4 (four) times a day as needed for diarrhea

## 2025-02-24 NOTE — ASSESSMENT & PLAN NOTE
Malnutrition Findings:                                 BMI Findings:        BMI Readings from Last 3 Encounters:   02/24/25 20.96 kg/m²   12/03/24 21.13 kg/m²   05/28/24 21.22 kg/m²     High-protein high-calorie diet  Continue to monitor weight  Recommend follow-up with nutrition  Recommend nutritional supplements     Body mass index is 20.96 kg/m².

## 2025-02-24 NOTE — PROGRESS NOTES
Name: Allyssa Stanton      : 1950      MRN: [id number]  Encounter Provider: Erlinda Myrick MD  Encounter Date: 2025   Encounter department: [facility] [place]  :  Assessment & Plan  Crohn's disease of small intestine with other complication (HCC)  The patient is a 74-year-old woman with smoldering multiple myeloma as well as inflammatory bowel disease that was initially thought to be ulcerative colitis status post proctocolectomy with ileal pouch anal anastomosis in  but complicated by small bowel obstruction in 2019 with strictures in the pouch at the anastomosis and adhesions status post dilation of the stricture complicated by microperforation for which she underwent exploratory laparotomy with lysis of adhesions and creation of a loop ileostomy and eventual ileostomy reversal in 2019 thought to possibly have Crohn's disease versus ischemia of the pouch who now presents for follow-up.  Was previously recommended that she perform manual dilation and that it helped significant improvement.  In the past she had been on Entyvio since 2020 with some lost to follow-up but ultimately restarted and then again unable to be continued secondary to insurance issues. Most recently she has been on stelara. She now presents for follow-up, last seen in .     Overall feeling much better on Stelara     CT enterography 2022 with active inflammation of the distal ileum and pouch with strictures.     CT enterography from 2022 with active IBD with small bowel inflammation and luminal narrowing and stricturing involving the pouch as well as the distal ileum and pouch anastomosis.  No penetrating perianal disease.  The degree of mural hyperenhancement and wall thickening was less severe compared to prior CT enterography.      Last pouchoscopy 2022 with multiple large superficial irregular ulcers 50 cm from anal verge with 2 areas of narrowing  in the pouch as well as ulcerations.     Most recent CMP normal.  Lipid profile normal.  CBC with normal white blood cell count, hemoglobin, MCV, platelets.  TSH normal.  Most recent free T4 prior to that was elevated with low TSH.  Hemoglobin A1c elevated at 6.4 consistent with well-controlled diabetes.  Most recent Stelara level 7 with no antidrug antibodies.  Negative hepatitis profile as of October 2023 and quant gold positive at that time.        Continue Stelara every 28 days  Next blood work ordered now  Next quant gold and hepatitis panel ordered now  Repeat enterography  Next pouchoscopy ordered today     Continue Banatrol 2 times daily  Can use Imodium or Lomotil as needed to help with constipation, but would try to limit given prior bowel obstruction  Continue manual dilations as needed     Low residue diet (avoid leafy green vegetables, raw fruits and vegetables especially with skin, nuts, seeds, corn/popcorn, high-fiber foods).   Drink at least 8 cups of water per day  High-protein diet  Avoid eating 3 to 4 hours before bedtime     Continue B12 supplementation  Continue iron supplementation with vitamin C    Orders:    CBC and differential; Future    Comprehensive metabolic panel; Future    C-reactive protein; Future    Quantiferon TB Gold Plus Assay; Future    Chronic Hepatitis Panel; Future    Calprotectin,Fecal; Future    Vitamin B12; Future    Vitamin D 25 hydroxy; Future    Iron Panel (Includes Ferritin, Iron Sat%, Iron, and TIBC); Future    MRI enterography w wo; Future    Pouchoscopy; Future    EGD; Future    Skin Protectants, Misc. (eucerin) cream; Apply topically as needed for dry skin    Gastro-esophageal reflux disease without esophagitis  Continue pantoprazole  Consider adjusting dose or adding Pepcid  Gaviscon or Tums as needed for breakthrough symptoms  Things that can help improved reflux include: avoidance of trigger foods (potential foods include coffee, caffeine, chocolate, mint,  tomato-based products, spicy foods, fatty foods), avoid tight fitting clothing, elevated head of bed 30 degrees, avoid eating 2-3 hours prior to bedtime, weight loss, avoid alcohol, avoid tobacco use.           Multiple myeloma not having achieved remission (HCC)         Protein-calorie malnutrition, unspecified severity ([facility])  Malnutrition Findings:                                 BMI Findings:        BMI Readings from Last 3 Encounters:   02/24/25 20.96 kg/m²   12/03/24 21.13 kg/m²   05/28/24 21.22 kg/m²     High-protein high-calorie diet  Continue to monitor weight  Recommend follow-up with nutrition  Recommend nutritional supplements     Body mass index is 20.96 kg/m².            Low iron  Continue to monitor    Orders:    Iron Panel (Includes Ferritin, Iron Sat%, Iron, and TIBC); Future    Diarrhea, unspecified type    Orders:    diphenoxylate-atropine (LOMOTIL) 2.5-0.025 mg per tablet; Take 1 tablet by mouth 4 (four) times a day as needed for diarrhea    loperamide (IMODIUM) 2 mg capsule; Take 2 capsules (4 mg total) by mouth 4 (four) times a day as needed for diarrhea    Positive QuantiFERON-TB Gold test  Status posttreatment for latent tuberculosis         Immunocompromised patient ([facility])  Avoid live virus vaccines  Yearly flu shot  COVID vaccine and booster  Pneumonia vaccine  Shingrix  Routine skin exams with the dermatologist  Routine Pap smears  Routine mammograms  Continue to follow-up with hematology oncology    Orders:    CBC and differential; Future    Comprehensive metabolic panel; Future    C-reactive protein; Future    Quantiferon TB Gold Plus Assay; Future    Chronic Hepatitis Panel; Future    Calprotectin,Fecal; Future    Vitamin B12; Future    Vitamin D 25 hydroxy; Future    Iron Panel (Includes Ferritin, Iron Sat%, Iron, and TIBC); Future    MRI enterography w wo; Future    Vitamin B12 deficiency  Continue to monitor    Orders:    Vitamin B12; Future    Fungal infection    Orders:    nystatin  (MYCOSTATIN) cream; Apply topically 2 (two) times a day    Vitamin D deficiency    Orders:    Vitamin D 25 hydroxy; Future    Pouchitis (HCC)         Encounter for screening for other viral diseases    Orders:    Chronic Hepatitis Panel; Future    Dysphagia, unspecified type    Orders:    EGD; Future        History of Present Illness     Allyssa Stanton is a 74 y.o. female who presents for Crohn's.     Sometimes when she eats the food comes back up. She vomited.   She also had a spot of blood in the tissue. It happened 2 times. No blood otherwise. It was on the tissue.   Doing well on Stelara.   Stools not too firm. She eats yam, pasta, squash, potatoes. It is loose. She tries to hold it in. She has 3-4 stools per day. She can hold it in. Sometimes she will feel bloated. She tries maximo, gas-x. Close to bedtime it comes out and then it is better.gas-x and it helps.   Occasional heartburn. No routine nausea or vomiting. Some dysphagia when she does not chew.   Weight up and down.   Appetite is good.   Wt Readings from Last 3 Encounters:   02/24/25 52 kg (114 lb 9.6 oz)   12/03/24 52.4 kg (115 lb 8 oz)   05/28/24 52.6 kg (116 lb)         HPI    Review of Systems A complete review of systems is negative other than that noted above in the HPI.      Current Outpatient Medications   Medication Sig Dispense Refill    albuterol (PROVENTIL HFA,VENTOLIN HFA) 90 mcg/act inhaler Inhale      Ascorbic Acid (Vitamin C) 500 MG CAPS Take 1 capsule by mouth in the morning      atorvastatin (LIPITOR) 10 mg tablet Take 10 mg by mouth daily      brinzolamide (AZOPT) 1 % ophthalmic suspension 1 drop 3 (three) times a day      carboxymethylcellulose 0.5 % SOLN 1 drop 3 (three) times a day as needed for dry eyes      Cholecalciferol (VITAMIN D3) 1,000 units tablet Take 1,000 Units by mouth daily      Cyanocobalamin (B-12) 5000 MCG CAPS Take 1 capsule by mouth in the morning      diphenoxylate-atropine (LOMOTIL) 2.5-0.025 mg per tablet Take  1 tablet by mouth 4 (four) times a day as needed for diarrhea 360 tablet 0    erythromycin (ILOTYCIN) ophthalmic ointment       ferrous sulfate 325 (65 FE) MG EC tablet Take 325 mg by mouth 3 (three) times a day with meals      hydrochlorothiazide (HYDRODIURIL) 25 mg tablet Take 25 mg by mouth daily      ketoconazole (NIZORAL) 2 % cream Apply topically 2 (two) times a day      levothyroxine 88 mcg tablet       lisinopril (ZESTRIL) 5 mg tablet Take 5 mg by mouth daily      loperamide (IMODIUM) 2 mg capsule Take 2 capsules (4 mg total) by mouth 4 (four) times a day as needed for diarrhea 720 capsule 0    metFORMIN (GLUCOPHAGE) 500 mg tablet Take 500 mg by mouth 2 (two) times a day with meals Taking daily      mometasone (NASONEX) 50 mcg/act nasal spray       Multiple Vitamins-Minerals (Ocutabs-Lutein) TABS Take 1 tablet by mouth in the morning  0    Nebulizers (Compressor Nebulizer) MISC Use      nystatin (MYCOSTATIN) cream Apply topically 2 (two) times a day 30 g 0    omega-3-acid ethyl esters (LOVAZA) 1 g capsule Take 2 g by mouth 2 (two) times a day      pantoprazole (PROTONIX) 40 mg tablet       simethicone (MYLICON) 80 mg chewable tablet Chew 80 mg every 6 (six) hours as needed for flatulence      Skin Protectants, Misc. (eucerin) cream Apply topically as needed for dry skin 99 g 1    Trelegy Ellipta 100-62.5-25 MCG/ACT inhaler       ustekinumab (Stelara) 90 mg/mL subcutaneous injection Inject 1 mL (90 mg total) under the skin every 28 days 1 mL 10    vitamin A 2400 MCG (8000 UT) capsule Take 8,000 Units by mouth daily      baclofen 10 mg tablet Take 10 mg by mouth 2 (two) times a day (Patient not taking: Reported on 2/24/2025)      Banana Flakes (Banatrol plus) PACK Take 1 each by mouth 2 (two) times a day (Patient not taking: Reported on 11/30/2023) 75 each 1    dextromethorphan-guaiFENesin (ROBITUSSIN DM)  mg/5 mL syrup Take 10 mL by mouth every 4 (four) hours as needed for cough (Patient not taking:  "Reported on 2/24/2025) 118 mL 0    dorzolamide-timolol (COSOPT) 22.3-6.8 MG/ML ophthalmic solution 1 drop 2 times daily- 8 am and 8 pm- both eyes (Patient not taking: Reported on 12/3/2024)      fluticasone (FLONASE) 50 mcg/act nasal spray 1 spray daily (Patient not taking: Reported on 2/24/2025)      Fluticasone Furoate-Vilanterol 100-25 mcg/actuation inhaler Inhale 1 puff daily Rinse mouth after use. (Patient not taking: Reported on 2/24/2025)      levothyroxine 125 mcg tablet Take 125 mcg by mouth daily (Patient not taking: Reported on 12/3/2024)      rifampin (RIFADIN) 300 mg capsule Take 2 capsules (600 mg total) by mouth daily Take together with Atorvastatin (Patient not taking: Reported on 2/24/2025) 60 capsule 0    Zinc 50 MG CAPS Take by mouth (Patient not taking: Reported on 12/3/2024)       No current facility-administered medications for this visit.     Objective   /76 (BP Location: Right arm, Patient Position: Sitting, Cuff Size: Standard)   Pulse 59   Temp (!) 97.2 °F (36.2 °C) (Temporal)   Ht 5' 2\" (1.575 m)   Wt 52 kg (114 lb 9.6 oz)   SpO2 99%   BMI 20.96 kg/m²     PHYSICAL EXAMINATION:    General Appearance:   Alert, cooperative, no distress   HEENT:  Normocephalic, atraumatic, anicteric. Neck supple, symmetrical, trachea midline.   Lungs:   Equal chest rise and unlabored breathing, normal effort, no coughing.   Cardiovascular:   No visualized JVD.   Abdomen:   No abdominal distension.   Skin:   No jaundice, rashes, or lesions.    Musculoskeletal:   Normal range of motion visualized.   Psych:  Normal affect and normal insight.   Neuro:  Alert and appropriate.           Lab Results: I personally reviewed relevant lab results.       Results for orders placed during the hospital encounter of 12/21/21    EGD    Impression  1. Diminutive fundic polyp status post excisional biopsy removed via  2. Minimal duodenal bulb erosion, status post biopsy  3. Otherwise normal EGD, status post gastric " biopsies    RECOMMENDATION:  Ultrasound abdomen  Go for CT enterography as scheduled  Follow-up biopsy results in 1 week  Continue current medical management    Tato Donovan MD

## 2025-02-24 NOTE — ASSESSMENT & PLAN NOTE
Avoid live virus vaccines  Yearly flu shot  COVID vaccine and booster  Pneumonia vaccine  Shingrix  Routine skin exams with the dermatologist  Routine Pap smears  Routine mammograms  Continue to follow-up with hematology oncology    Orders:    CBC and differential; Future    Comprehensive metabolic panel; Future    C-reactive protein; Future    Quantiferon TB Gold Plus Assay; Future    Chronic Hepatitis Panel; Future    Calprotectin,Fecal; Future    Vitamin B12; Future    Vitamin D 25 hydroxy; Future    Iron Panel (Includes Ferritin, Iron Sat%, Iron, and TIBC); Future    MRI enterography w wo; Future

## 2025-02-25 ENCOUNTER — TELEPHONE (OUTPATIENT)
Age: 75
End: 2025-02-25

## 2025-02-25 NOTE — TELEPHONE ENCOUNTER
"Pt calling in, reports diphenoxylate-atropine (LOMOTIL)    Is $65 copay and she would like an alternate medication that would be lower copay.     Per OV note  \"Can use Imodium or Lomotil as needed to help with constipation, but would try to limit given prior bowel obstruction\"    Please advise if okay for pt to just use imodium as needed then?   "

## 2025-02-25 NOTE — TELEPHONE ENCOUNTER
I called patient, reviewed provider recommendation for OTC Imodium or Pepto Bismol/Kaopectate. Imodium was ordered at visit yesterday and patient confirmed she does have that on hand. I advised trying that first and if no improvement try the others listed.

## 2025-02-28 ENCOUNTER — TELEPHONE (OUTPATIENT)
Age: 75
End: 2025-02-28

## 2025-02-28 NOTE — TELEPHONE ENCOUNTER
Scheduled date of EGD/Pouchoscopy(as of today): 3/20/25  Physician performing EGD/Pouchoscopy: Venus  Location of EGD/Pouchoscopy: [place]  Instructions reviewed with patient by: Charleen FRY- Custom instructions mailed to ptn. Please see below.  Clearances:     **Ptn was sent a custom prep of clear liquid diet from 6pm to midnight and nothing to eat or drink after that. She was also directed to place 2 fleet enemas 1 after the other into her pouch 1 hour prior to her arrival time at the hospital. This was a custom prep issued by Dr. Donovan and mailed to the ptn.

## 2025-03-05 ENCOUNTER — TELEPHONE (OUTPATIENT)
Age: 75
End: 2025-03-05

## 2025-03-05 NOTE — TELEPHONE ENCOUNTER
Pt. Calling to setup transportation to MRI on 3/10 she needs to be there by 7 am and she is asking for rideshare , please reach out to patient wether this can be arranged or not

## 2025-03-06 ENCOUNTER — APPOINTMENT (OUTPATIENT)
Dept: LAB | Facility: HOSPITAL | Age: 75
End: 2025-03-06
Payer: COMMERCIAL

## 2025-03-06 ENCOUNTER — RESULTS FOLLOW-UP (OUTPATIENT)
Dept: GASTROENTEROLOGY | Facility: CLINIC | Age: 75
End: 2025-03-06

## 2025-03-06 DIAGNOSIS — Z11.59 ENCOUNTER FOR SCREENING FOR OTHER VIRAL DISEASES: ICD-10-CM

## 2025-03-06 DIAGNOSIS — E61.1 LOW IRON: ICD-10-CM

## 2025-03-06 DIAGNOSIS — D84.9 IMMUNOCOMPROMISED PATIENT (HCC): ICD-10-CM

## 2025-03-06 DIAGNOSIS — K50.018 CROHN'S DISEASE OF SMALL INTESTINE WITH OTHER COMPLICATION (HCC): ICD-10-CM

## 2025-03-06 DIAGNOSIS — E55.9 VITAMIN D DEFICIENCY: Primary | ICD-10-CM

## 2025-03-06 DIAGNOSIS — E55.9 VITAMIN D DEFICIENCY: ICD-10-CM

## 2025-03-06 DIAGNOSIS — E53.8 VITAMIN B12 DEFICIENCY: ICD-10-CM

## 2025-03-06 LAB
25(OH)D3 SERPL-MCNC: 20.5 NG/ML (ref 30–100)
ALBUMIN SERPL BCG-MCNC: 4 G/DL (ref 3.5–5)
ALP SERPL-CCNC: 97 U/L (ref 34–104)
ALT SERPL W P-5'-P-CCNC: 21 U/L (ref 7–52)
ANION GAP SERPL CALCULATED.3IONS-SCNC: 6 MMOL/L (ref 4–13)
AST SERPL W P-5'-P-CCNC: 21 U/L (ref 13–39)
BASOPHILS # BLD MANUAL: 0.05 THOUSAND/UL (ref 0–0.1)
BASOPHILS NFR MAR MANUAL: 1 % (ref 0–1)
BILIRUB SERPL-MCNC: 0.76 MG/DL (ref 0.2–1)
BUN SERPL-MCNC: 17 MG/DL (ref 5–25)
CALCIUM SERPL-MCNC: 9.4 MG/DL (ref 8.4–10.2)
CHLORIDE SERPL-SCNC: 98 MMOL/L (ref 96–108)
CO2 SERPL-SCNC: 30 MMOL/L (ref 21–32)
CREAT SERPL-MCNC: 0.9 MG/DL (ref 0.6–1.3)
CRP SERPL QL: 3.7 MG/L
EOSINOPHIL # BLD MANUAL: 0.05 THOUSAND/UL (ref 0–0.4)
EOSINOPHIL NFR BLD MANUAL: 1 % (ref 0–6)
ERYTHROCYTE [DISTWIDTH] IN BLOOD BY AUTOMATED COUNT: 13.7 % (ref 11.6–15.1)
FERRITIN SERPL-MCNC: 26 NG/ML (ref 11–307)
GFR SERPL CREATININE-BSD FRML MDRD: 64 ML/MIN/1.73SQ M
GLUCOSE SERPL-MCNC: 102 MG/DL (ref 65–140)
HBV CORE AB SER QL: NORMAL
HBV CORE IGM SER QL: NORMAL
HBV SURFACE AG SER QL: NORMAL
HCT VFR BLD AUTO: 41.3 % (ref 34.8–46.1)
HCV AB SER QL: NORMAL
HGB BLD-MCNC: 12.9 G/DL (ref 11.5–15.4)
IRON SATN MFR SERPL: 17 % (ref 15–50)
IRON SERPL-MCNC: 83 UG/DL (ref 50–212)
LYMPHOCYTES # BLD AUTO: 2.56 THOUSAND/UL (ref 0.6–4.47)
LYMPHOCYTES # BLD AUTO: 52 % (ref 14–44)
MCH RBC QN AUTO: 28.2 PG (ref 26.8–34.3)
MCHC RBC AUTO-ENTMCNC: 31.2 G/DL (ref 31.4–37.4)
MCV RBC AUTO: 90 FL (ref 82–98)
MONOCYTES # BLD AUTO: 0.69 THOUSAND/UL (ref 0–1.22)
MONOCYTES NFR BLD: 14 % (ref 4–12)
NEUTROPHILS # BLD MANUAL: 1.58 THOUSAND/UL (ref 1.85–7.62)
NEUTS SEG NFR BLD AUTO: 32 % (ref 43–75)
PLATELET # BLD AUTO: 264 THOUSANDS/UL (ref 149–390)
PLATELET BLD QL SMEAR: ADEQUATE
PMV BLD AUTO: 11.7 FL (ref 8.9–12.7)
POTASSIUM SERPL-SCNC: 3.7 MMOL/L (ref 3.5–5.3)
PROT SERPL-MCNC: 7.7 G/DL (ref 6.4–8.4)
RBC # BLD AUTO: 4.58 MILLION/UL (ref 3.81–5.12)
RBC MORPH BLD: NORMAL
SODIUM SERPL-SCNC: 134 MMOL/L (ref 135–147)
TIBC SERPL-MCNC: 476 UG/DL (ref 250–450)
TRANSFERRIN SERPL-MCNC: 340 MG/DL (ref 203–362)
UIBC SERPL-MCNC: 393 UG/DL (ref 155–355)
VIT B12 SERPL-MCNC: 922 PG/ML (ref 180–914)
WBC # BLD AUTO: 4.93 THOUSAND/UL (ref 4.31–10.16)

## 2025-03-06 PROCEDURE — 83540 ASSAY OF IRON: CPT

## 2025-03-06 PROCEDURE — 83993 ASSAY FOR CALPROTECTIN FECAL: CPT

## 2025-03-06 PROCEDURE — 86140 C-REACTIVE PROTEIN: CPT

## 2025-03-06 PROCEDURE — 82728 ASSAY OF FERRITIN: CPT

## 2025-03-06 PROCEDURE — 87340 HEPATITIS B SURFACE AG IA: CPT

## 2025-03-06 PROCEDURE — 83550 IRON BINDING TEST: CPT

## 2025-03-06 PROCEDURE — 86480 TB TEST CELL IMMUN MEASURE: CPT

## 2025-03-06 PROCEDURE — 86705 HEP B CORE ANTIBODY IGM: CPT

## 2025-03-06 PROCEDURE — 85027 COMPLETE CBC AUTOMATED: CPT

## 2025-03-06 PROCEDURE — 80053 COMPREHEN METABOLIC PANEL: CPT

## 2025-03-06 PROCEDURE — 86704 HEP B CORE ANTIBODY TOTAL: CPT

## 2025-03-06 PROCEDURE — 82306 VITAMIN D 25 HYDROXY: CPT

## 2025-03-06 PROCEDURE — 36415 COLL VENOUS BLD VENIPUNCTURE: CPT

## 2025-03-06 PROCEDURE — 86803 HEPATITIS C AB TEST: CPT

## 2025-03-06 PROCEDURE — 82607 VITAMIN B-12: CPT

## 2025-03-06 PROCEDURE — 85007 BL SMEAR W/DIFF WBC COUNT: CPT

## 2025-03-06 RX ORDER — ERGOCALCIFEROL 1.25 MG/1
50000 CAPSULE ORAL WEEKLY
Qty: 12 CAPSULE | Refills: 0 | Status: SHIPPED | OUTPATIENT
Start: 2025-03-06 | End: 2025-03-24

## 2025-03-06 NOTE — TELEPHONE ENCOUNTER
Patient calling stating she need a ride to her MRI scheduled 3/10/2025, stating she needs ride share. Patient stated she was told to contact MRI Dept / Explained that was incorrect and our office staff will assit her with getting a ride to MRI - Patient is requesting a cb once transportation is arranged

## 2025-03-07 LAB
CALPROTECTIN STL-MCNC: 357 ÂΜG/G
GAMMA INTERFERON BACKGROUND BLD IA-ACNC: 0.06 IU/ML
M TB IFN-G BLD-IMP: NEGATIVE
M TB IFN-G CD4+ BCKGRND COR BLD-ACNC: -0.02 IU/ML
M TB IFN-G CD4+ BCKGRND COR BLD-ACNC: -0.03 IU/ML
MITOGEN IGNF BCKGRD COR BLD-ACNC: 8.77 IU/ML

## 2025-03-18 ENCOUNTER — PREP FOR PROCEDURE (OUTPATIENT)
Age: 75
End: 2025-03-18

## 2025-03-20 ENCOUNTER — HOSPITAL ENCOUNTER (OUTPATIENT)
Dept: GASTROENTEROLOGY | Facility: HOSPITAL | Age: 75
Setting detail: OUTPATIENT SURGERY
Discharge: HOME | End: 2025-03-20
Attending: INTERNAL MEDICINE
Payer: COMMERCIAL

## 2025-03-20 ENCOUNTER — ANESTHESIA EVENT (OUTPATIENT)
Dept: GASTROENTEROLOGY | Facility: HOSPITAL | Age: 75
End: 2025-03-20
Payer: COMMERCIAL

## 2025-03-20 ENCOUNTER — ANESTHESIA (OUTPATIENT)
Dept: GASTROENTEROLOGY | Facility: HOSPITAL | Age: 75
End: 2025-03-20
Payer: COMMERCIAL

## 2025-03-20 VITALS
SYSTOLIC BLOOD PRESSURE: 126 MMHG | OXYGEN SATURATION: 97 % | HEIGHT: 64 IN | TEMPERATURE: 97 F | HEART RATE: 59 BPM | WEIGHT: 117.5 LBS | DIASTOLIC BLOOD PRESSURE: 58 MMHG | RESPIRATION RATE: 22 BRPM | BODY MASS INDEX: 20.06 KG/M2

## 2025-03-20 DIAGNOSIS — K29.01 ACUTE SUPERFICIAL GASTRITIS WITH HEMORRHAGE: Primary | ICD-10-CM

## 2025-03-20 DIAGNOSIS — K50.018 CROHN'S DISEASE OF SMALL INTESTINE WITH OTHER COMPLICATION (HCC): ICD-10-CM

## 2025-03-20 DIAGNOSIS — R13.10 DYSPHAGIA, UNSPECIFIED TYPE: ICD-10-CM

## 2025-03-20 LAB — GLUCOSE SERPL-MCNC: 96 MG/DL (ref 65–140)

## 2025-03-20 PROCEDURE — 88342 IMHCHEM/IMCYTCHM 1ST ANTB: CPT | Performed by: PATHOLOGY

## 2025-03-20 PROCEDURE — 88341 IMHCHEM/IMCYTCHM EA ADD ANTB: CPT | Performed by: PATHOLOGY

## 2025-03-20 PROCEDURE — C1726 CATH, BAL DIL, NON-VASCULAR: HCPCS

## 2025-03-20 PROCEDURE — 88305 TISSUE EXAM BY PATHOLOGIST: CPT | Performed by: PATHOLOGY

## 2025-03-20 PROCEDURE — 82948 REAGENT STRIP/BLOOD GLUCOSE: CPT

## 2025-03-20 RX ORDER — SODIUM CHLORIDE, SODIUM LACTATE, POTASSIUM CHLORIDE, CALCIUM CHLORIDE 600; 310; 30; 20 MG/100ML; MG/100ML; MG/100ML; MG/100ML
20 INJECTION, SOLUTION INTRAVENOUS CONTINUOUS
Status: CANCELLED | OUTPATIENT
Start: 2025-03-20

## 2025-03-20 RX ORDER — LIDOCAINE HYDROCHLORIDE 20 MG/ML
INJECTION, SOLUTION EPIDURAL; INFILTRATION; INTRACAUDAL; PERINEURAL AS NEEDED
Status: DISCONTINUED | OUTPATIENT
Start: 2025-03-20 | End: 2025-03-20

## 2025-03-20 RX ORDER — PANTOPRAZOLE SODIUM 40 MG/1
40 TABLET, DELAYED RELEASE ORAL
Qty: 60 TABLET | Refills: 2 | Status: SHIPPED | OUTPATIENT
Start: 2025-03-20 | End: 2025-03-21 | Stop reason: SDUPTHER

## 2025-03-20 RX ORDER — SODIUM CHLORIDE, SODIUM LACTATE, POTASSIUM CHLORIDE, CALCIUM CHLORIDE 600; 310; 30; 20 MG/100ML; MG/100ML; MG/100ML; MG/100ML
125 INJECTION, SOLUTION INTRAVENOUS CONTINUOUS
Status: DISCONTINUED | OUTPATIENT
Start: 2025-03-20 | End: 2025-03-24 | Stop reason: HOSPADM

## 2025-03-20 RX ORDER — PROPOFOL 10 MG/ML
INJECTION, EMULSION INTRAVENOUS AS NEEDED
Status: DISCONTINUED | OUTPATIENT
Start: 2025-03-20 | End: 2025-03-20

## 2025-03-20 RX ORDER — ONDANSETRON 2 MG/ML
4 INJECTION INTRAMUSCULAR; INTRAVENOUS ONCE AS NEEDED
Status: CANCELLED | OUTPATIENT
Start: 2025-03-20

## 2025-03-20 RX ORDER — PHENYLEPHRINE HCL IN 0.9% NACL 1 MG/10 ML
SYRINGE (ML) INTRAVENOUS AS NEEDED
Status: DISCONTINUED | OUTPATIENT
Start: 2025-03-20 | End: 2025-03-20

## 2025-03-20 RX ADMIN — PROPOFOL 70 MG: 10 INJECTION, EMULSION INTRAVENOUS at 07:42

## 2025-03-20 RX ADMIN — PROPOFOL 30 MG: 10 INJECTION, EMULSION INTRAVENOUS at 07:44

## 2025-03-20 RX ADMIN — PROPOFOL 30 MG: 10 INJECTION, EMULSION INTRAVENOUS at 07:47

## 2025-03-20 RX ADMIN — PROPOFOL 40 MG: 10 INJECTION, EMULSION INTRAVENOUS at 07:53

## 2025-03-20 RX ADMIN — LIDOCAINE HYDROCHLORIDE 50 MG: 20 INJECTION, SOLUTION EPIDURAL; INFILTRATION; INTRACAUDAL; PERINEURAL at 07:42

## 2025-03-20 RX ADMIN — PROPOFOL 30 MG: 10 INJECTION, EMULSION INTRAVENOUS at 07:50

## 2025-03-20 RX ADMIN — Medication 100 MCG: at 07:54

## 2025-03-20 RX ADMIN — SODIUM CHLORIDE, SODIUM LACTATE, POTASSIUM CHLORIDE, AND CALCIUM CHLORIDE: .6; .31; .03; .02 INJECTION, SOLUTION INTRAVENOUS at 07:39

## 2025-03-20 RX ADMIN — PROPOFOL 30 MG: 10 INJECTION, EMULSION INTRAVENOUS at 07:57

## 2025-03-20 NOTE — INTERVAL H&P NOTE
H&P reviewed. After examining the patient I find no changes in the patients condition since the H&P had been written.    Vitals:    03/20/25 0702   BP: 146/65   Pulse: 64   Resp: (!) 32   Temp: (!) 97.1 °F (36.2 °C)   SpO2: 99%

## 2025-03-20 NOTE — ANESTHESIA PREPROCEDURE EVALUATION
Procedure:  POUCHOSCOPY  EGD    Relevant Problems   CARDIO   (+) Hypertension goal BP (blood pressure) < 140/90      ENDO   (+) Hypothyroid   (+) Type 2 diabetes mellitus with other specified complication, unspecified whether long term insulin use (HCC)      GI/HEPATIC   (+) Gastro-esophageal reflux disease without esophagitis      HEMATOLOGY   (+) Immunocompromised patient (HCC)      PULMONARY   (+) Asthma   (+) COPD (chronic obstructive pulmonary disease) (HCC)        Physical Exam    Airway    Mallampati score: II  TM Distance: >3 FB  Neck ROM: full     Dental   No notable dental hx     Cardiovascular  Rhythm: regular, No weak pulses    Pulmonary   No stridor    Other Findings  post-pubertal.      Anesthesia Plan  ASA Score- 3     Anesthesia Type- IV sedation with anesthesia with ASA Monitors.         Additional Monitors:     Airway Plan:            Plan Factors-    Chart reviewed.   Existing labs reviewed. Patient summary reviewed.                  Induction- intravenous.    Postoperative Plan-         Informed Consent- Anesthetic plan and risks discussed with patient.  I personally reviewed this patient with the CRNA. Discussed and agreed on the Anesthesia Plan with the CRNA..      NPO Status:  Vitals Value Taken Time   Date of last liquid 03/19/25 03/20/25 0703   Time of last liquid 2230 03/20/25 0703   Date of last solid 03/19/25 03/20/25 0703   Time of last solid 1000 03/20/25 0703

## 2025-03-20 NOTE — ANESTHESIA POSTPROCEDURE EVALUATION
Post-Op Assessment Note    CV Status:  Stable    Pain management: adequate       Mental Status:  Arousable   Hydration Status:  Stable   PONV Controlled:  None   Airway Patency:  Patent     Post Op Vitals Reviewed: Yes    No anethesia notable event occurred.    Staff: CRNA, Anesthesiologist           Last Filed PACU Vitals:  Vitals Value Taken Time   Temp 97.6    Pulse 72    BP 87/51    Resp 17    SpO2 99%

## 2025-03-20 NOTE — H&P
History and Physical - [facility]  Allyssa Stanton 74 y.o. female MRN: [id number]                  HPI: Allyssa Stanton is a 74 y.o. year old female who presents for EGD and pouchoscopy.  Patient has a history of epigastric pain, small bowel Crohn's disease.  Last colonoscopy 3 years ago      REVIEW OF SYSTEMS: Per the HPI, and otherwise unremarkable.    Historical Information   Past Medical History:   Diagnosis Date    Asthma     COPD (chronic obstructive pulmonary disease) (HCC)     COVID     Crohn's disease (HCC)     Diabetes mellitus (HCC)     Hypertension     Multiple myeloma (HCC)     Ocular hypertension      Past Surgical History:   Procedure Laterality Date    CATARACT EXTRACTION      COLON SURGERY      Partial colectomy, partial removal of the rectum    EXPLORATORY LAPAROTOMY      ILEOSTOMY      IR BIOPSY BONE MARROW  10/13/2021     Social History   Social History     Substance and Sexual Activity   Alcohol Use Never     Social History     Substance and Sexual Activity   Drug Use Never     Social History     Tobacco Use   Smoking Status Never   Smokeless Tobacco Never     Family History   Problem Relation Age of Onset    Diabetes Mother     Hypertension Mother        Meds/Allergies     Not in a hospital admission.    Allergies   Allergen Reactions    Brimonidine      Red eyes    Mixed Ragweed Allergic Rhinitis    Tramadol Nausea Only       Objective     There were no vitals taken for this visit.      PHYSICAL EXAM    Gen: NAD  CV: RRR  CHEST: Clear  ABD: soft, NT/ND  EXT: no edema  Neuro: AAO      ASSESSMENT/PLAN:  This is a 74 y.o. year old female here for epigastric pain, Crohn's    PLAN:   Procedure: EGD and pouchoscopy

## 2025-03-21 DIAGNOSIS — K29.01 ACUTE SUPERFICIAL GASTRITIS WITH HEMORRHAGE: ICD-10-CM

## 2025-03-21 DIAGNOSIS — R10.84 GENERALIZED ABDOMINAL PAIN: Primary | ICD-10-CM

## 2025-03-21 RX ORDER — BACLOFEN 10 MG/1
10 TABLET ORAL 2 TIMES DAILY
Qty: 60 TABLET | Refills: 3 | Status: SHIPPED | OUTPATIENT
Start: 2025-03-21

## 2025-03-21 RX ORDER — PANTOPRAZOLE SODIUM 40 MG/1
40 TABLET, DELAYED RELEASE ORAL
Qty: 60 TABLET | Refills: 2 | Status: SHIPPED | OUTPATIENT
Start: 2025-03-21

## 2025-03-21 NOTE — TELEPHONE ENCOUNTER
Pt calling because her prescriptions for Baclofen and Pantoprazole  were sent to wrong pharmacy yesterday after pouchoscopy.  States they need to go to [facility].   Reviewed chart and only see that Pantoprazole was sent in. Will reach out to provider regarding Baclofen and sending both to [facility].    Pended Pantoprazole.  Unable to pend Baclofen.

## 2025-03-24 ENCOUNTER — HOSPITAL ENCOUNTER (OUTPATIENT)
Dept: MRI IMAGING | Facility: HOSPITAL | Age: 75
Discharge: HOME | End: 2025-03-24
Attending: INTERNAL MEDICINE
Payer: COMMERCIAL

## 2025-03-24 DIAGNOSIS — D84.9 IMMUNOCOMPROMISED PATIENT (HCC): ICD-10-CM

## 2025-03-24 DIAGNOSIS — E55.9 VITAMIN D DEFICIENCY: ICD-10-CM

## 2025-03-24 DIAGNOSIS — K50.018 CROHN'S DISEASE OF SMALL INTESTINE WITH OTHER COMPLICATION (HCC): ICD-10-CM

## 2025-03-24 PROCEDURE — 74183 MRI ABD W/O CNTR FLWD CNTR: CPT

## 2025-03-24 PROCEDURE — A9585 GADOBUTROL INJECTION: HCPCS | Performed by: INTERNAL MEDICINE

## 2025-03-24 PROCEDURE — 72197 MRI PELVIS W/O & W/DYE: CPT

## 2025-03-24 RX ORDER — GADOBUTROL 604.72 MG/ML
5 INJECTION INTRAVENOUS
Status: COMPLETED | OUTPATIENT
Start: 2025-03-24 | End: 2025-03-24

## 2025-03-24 RX ORDER — ERGOCALCIFEROL 1.25 MG/1
50000 CAPSULE ORAL WEEKLY
Qty: 12 CAPSULE | Refills: 0 | Status: SHIPPED | OUTPATIENT
Start: 2025-03-24 | End: 2025-06-10

## 2025-03-24 RX ADMIN — GADOBUTROL 5 ML: 604.72 INJECTION INTRAVENOUS at 09:24

## 2025-03-24 NOTE — TELEPHONE ENCOUNTER
Patient calling with questions regarding what medications that were prescribed are fore.  Questions about baclofen and the banatrol plus pack.  Explained reasoning for medications and states understanding.  Patient states she never received script for Vitamin D that she was told was being prescribed.  Please send to [facility].

## 2025-03-24 NOTE — TELEPHONE ENCOUNTER
Pt calling with questions about medications she was prescribed after her pouchoscopy. Transferred call to Frances

## 2025-03-24 NOTE — TELEPHONE ENCOUNTER
I spoke with the patient.  Patient aware vitamin D prescription will be sent to [facility].

## 2025-04-01 ENCOUNTER — TELEPHONE (OUTPATIENT)
Age: 75
End: 2025-04-01

## 2025-04-01 NOTE — TELEPHONE ENCOUNTER
Patients GI provider:  Dr. BELLO    Number to return call: [phone]    Reason for call: Pt calling office requesting results of recent procedure. Please review results and contact patient.    Scheduled procedure/appointment date if applicable: 8/21/2025

## 2025-04-01 NOTE — TELEPHONE ENCOUNTER
Spoke with pt. She is is requesting MRI and tissue exam results. Advised MRI resulted and pathology in process. Will forward to provider to review MRI in the interim. Pt appreciative.

## 2025-04-01 NOTE — TELEPHONE ENCOUNTER
Routing to Dr. Erlinda Myrick MD     She is is requesting MRI and tissue exam results.  Thank you!

## 2025-04-02 PROCEDURE — 88342 IMHCHEM/IMCYTCHM 1ST ANTB: CPT | Performed by: PATHOLOGY

## 2025-04-02 PROCEDURE — 88341 IMHCHEM/IMCYTCHM EA ADD ANTB: CPT | Performed by: PATHOLOGY

## 2025-04-02 PROCEDURE — 88305 TISSUE EXAM BY PATHOLOGIST: CPT | Performed by: PATHOLOGY

## 2025-04-03 NOTE — TELEPHONE ENCOUNTER
Spoke with the pt relayed the provider messages regarding the results and pt verbalizes understanding. Provider contact number if any question.

## 2025-04-07 ENCOUNTER — RA CDI HCC (OUTPATIENT)
Dept: OTHER | Facility: HOSPITAL | Age: 75
End: 2025-04-07

## 2025-04-11 ENCOUNTER — TELEPHONE (OUTPATIENT)
Dept: FAMILY MEDICINE CLINIC | Facility: CLINIC | Age: 75
End: 2025-04-11

## 2025-04-15 ENCOUNTER — TELEPHONE (OUTPATIENT)
Dept: ADMINISTRATIVE | Facility: OTHER | Age: 75
End: 2025-04-15

## 2025-04-15 ENCOUNTER — OFFICE VISIT (OUTPATIENT)
Dept: FAMILY MEDICINE CLINIC | Facility: CLINIC | Age: 75
End: 2025-04-15
Payer: COMMERCIAL

## 2025-04-15 VITALS
OXYGEN SATURATION: 99 % | HEART RATE: 84 BPM | TEMPERATURE: 97 F | HEIGHT: 64 IN | SYSTOLIC BLOOD PRESSURE: 142 MMHG | WEIGHT: 118 LBS | BODY MASS INDEX: 20.14 KG/M2 | DIASTOLIC BLOOD PRESSURE: 82 MMHG

## 2025-04-15 DIAGNOSIS — E03.9 HYPOTHYROIDISM, UNSPECIFIED TYPE: ICD-10-CM

## 2025-04-15 DIAGNOSIS — Z76.89 ENCOUNTER TO ESTABLISH CARE WITH NEW DOCTOR: Primary | ICD-10-CM

## 2025-04-15 DIAGNOSIS — K50.018 CROHN'S DISEASE OF SMALL INTESTINE WITH OTHER COMPLICATION (HCC): ICD-10-CM

## 2025-04-15 DIAGNOSIS — J44.9 CHRONIC OBSTRUCTIVE PULMONARY DISEASE, UNSPECIFIED COPD TYPE (HCC): ICD-10-CM

## 2025-04-15 DIAGNOSIS — H40.1132 PRIMARY OPEN ANGLE GLAUCOMA (POAG) OF BOTH EYES, MODERATE STAGE: ICD-10-CM

## 2025-04-15 DIAGNOSIS — C90.00 MULTIPLE MYELOMA NOT HAVING ACHIEVED REMISSION (HCC): ICD-10-CM

## 2025-04-15 DIAGNOSIS — E11.69 TYPE 2 DIABETES MELLITUS WITH OTHER SPECIFIED COMPLICATION, UNSPECIFIED WHETHER LONG TERM INSULIN USE (HCC): ICD-10-CM

## 2025-04-15 DIAGNOSIS — Z12.31 ENCOUNTER FOR SCREENING MAMMOGRAM FOR BREAST CANCER: ICD-10-CM

## 2025-04-15 DIAGNOSIS — E11.9 DIABETES MELLITUS WITHOUT COMPLICATION (HCC): ICD-10-CM

## 2025-04-15 LAB
CREAT UR-MCNC: 203.6 MG/DL
LEFT EYE DIABETIC RETINOPATHY: ABNORMAL
LEFT EYE IMAGE QUALITY: ABNORMAL
LEFT EYE MACULAR EDEMA: ABNORMAL
LEFT EYE OTHER RETINOPATHY: ABNORMAL
MICROALBUMIN UR-MCNC: 8.5 MG/L
MICROALBUMIN/CREAT 24H UR: 4 MG/G CREATININE (ref 0–30)
RIGHT EYE DIABETIC RETINOPATHY: ABNORMAL
RIGHT EYE IMAGE QUALITY: ABNORMAL
RIGHT EYE MACULAR EDEMA: ABNORMAL
RIGHT EYE OTHER RETINOPATHY: ABNORMAL
SEVERITY (EYE EXAM): ABNORMAL
SL AMB POCT HEMOGLOBIN AIC: 5.9 (ref ?–6.5)

## 2025-04-15 PROCEDURE — 99204 OFFICE O/P NEW MOD 45 MIN: CPT | Performed by: FAMILY MEDICINE

## 2025-04-15 PROCEDURE — 92250 FUNDUS PHOTOGRAPHY W/I&R: CPT | Performed by: FAMILY MEDICINE

## 2025-04-15 PROCEDURE — 82570 ASSAY OF URINE CREATININE: CPT | Performed by: FAMILY MEDICINE

## 2025-04-15 PROCEDURE — 82043 UR ALBUMIN QUANTITATIVE: CPT | Performed by: FAMILY MEDICINE

## 2025-04-15 PROCEDURE — 83036 HEMOGLOBIN GLYCOSYLATED A1C: CPT | Performed by: FAMILY MEDICINE

## 2025-04-15 RX ORDER — LISINOPRIL 5 MG/1
5 TABLET ORAL DAILY
Qty: 90 TABLET | Refills: 1 | Status: SHIPPED | OUTPATIENT
Start: 2025-04-15

## 2025-04-15 RX ORDER — ATORVASTATIN CALCIUM 10 MG/1
10 TABLET, FILM COATED ORAL DAILY
Qty: 90 TABLET | Refills: 1 | Status: SHIPPED | OUTPATIENT
Start: 2025-04-15

## 2025-04-15 RX ORDER — LEVOTHYROXINE SODIUM 88 UG/1
88 TABLET ORAL DAILY
Qty: 90 TABLET | Refills: 1 | Status: SHIPPED | OUTPATIENT
Start: 2025-04-15

## 2025-04-15 NOTE — TELEPHONE ENCOUNTER
----- Message from Radha MCKEON sent at 4/15/2025 10:02 AM EDT -----  Regarding: care gap request AWV  04/15/25 10:02 AM    Hello, our patient attached above has had Medicare AWV completed/performed. Please assist in updating the patient chart by pulling the Care Everywhere (CE) document. The date of service is 9/04/2024.     Thank you,  Radha HARDEN [address]

## 2025-04-15 NOTE — ASSESSMENT & PLAN NOTE
Lab Results   Component Value Date    HGBA1C 5.9 04/15/2025       Orders:  •  Ambulatory Referral to Ophthalmology; Future  •  CBC and differential; Future  •  Comprehensive metabolic panel; Future  •  TSH, 3rd generation with Free T4 reflex; Future  •  Lipid Panel with Direct LDL reflex; Future

## 2025-04-15 NOTE — PROGRESS NOTES
Name: Allyssa Stanton      : 1950      MRN: [id number]  Encounter Provider: Laverne Ortega DO  Encounter Date: 4/15/2025   Encounter department: [facility] [address]  [address]    Assessment & Plan  Diabetes mellitus without complication (HCC)    Lab Results   Component Value Date    HGBA1C 5.9 04/15/2025       Orders:  •  metFORMIN (GLUCOPHAGE) 500 mg tablet; Take 1 tablet (500 mg total) by mouth daily with breakfast Taking daily  •  lisinopril (ZESTRIL) 5 mg tablet; Take 1 tablet (5 mg total) by mouth daily  •  atorvastatin (LIPITOR) 10 mg tablet; Take 1 tablet (10 mg total) by mouth daily  •  POCT hemoglobin A1c  •  Albumin / creatinine urine ratio; Future  •  IRIS Diabetic eye exam    Encounter for screening mammogram for breast cancer    Orders:  •  Mammo screening bilateral w 3d and cad; Future    Encounter to establish care with new doctor         Chronic obstructive pulmonary disease, unspecified COPD type (HCC)  Stable, continue current management.        Crohn's disease of small intestine with other complication (HCC)  Stable, following with GYN.        Hypothyroidism, unspecified type    Orders:  •  levothyroxine 88 mcg tablet; Take 1 tablet (88 mcg total) by mouth daily    Type 2 diabetes mellitus with other specified complication, unspecified whether long term insulin use (HCC)    Lab Results   Component Value Date    HGBA1C 5.9 04/15/2025       Orders:  •  Ambulatory Referral to Ophthalmology; Future  •  CBC and differential; Future  •  Comprehensive metabolic panel; Future  •  TSH, 3rd generation with Free T4 reflex; Future  •  Lipid Panel with Direct LDL reflex; Future    Primary open angle glaucoma (POAG) of both eyes, moderate stage    Orders:  •  Ambulatory Referral to Ophthalmology; Future    Multiple myeloma not having achieved remission (HCC)  Following with [facility] oncology.             History of Present Illness     Diabetes      Patient presents to the  office to establish care. States taht she is at her baseline level of health. Denies any acute concerns or complaints. Notes that she is doing well. She is She has been following with glaucoma specialist. [facility]      Review of Systems  Past Medical History:   Diagnosis Date   • Asthma    • COPD (chronic obstructive pulmonary disease) (HCC)    • COVID    • Crohn's disease (HCC)    • Diabetes mellitus (HCC)    • Hypertension    • Multiple myeloma (HCC)    • Ocular hypertension      Past Surgical History:   Procedure Laterality Date   • CATARACT EXTRACTION     • COLON SURGERY      Partial colectomy, partial removal of the rectum   • COLONOSCOPY     • EXPLORATORY LAPAROTOMY     • ILEOSTOMY     • IR BIOPSY BONE MARROW  10/13/2021     Family History   Problem Relation Age of Onset   • Diabetes Mother    • Hypertension Mother      Social History     Tobacco Use   • Smoking status: Never   • Smokeless tobacco: Never   Vaping Use   • Vaping status: Never Used   Substance and Sexual Activity   • Alcohol use: Never   • Drug use: Never   • Sexual activity: Not Currently     Current Outpatient Medications on File Prior to Visit   Medication Sig   • albuterol (PROVENTIL HFA,VENTOLIN HFA) 90 mcg/act inhaler Inhale   • Ascorbic Acid (Vitamin C) 500 MG CAPS Take 1 capsule by mouth in the morning   • baclofen 10 mg tablet Take 1 tablet (10 mg total) by mouth 2 (two) times a day   • brinzolamide (AZOPT) 1 % ophthalmic suspension 1 drop 3 (three) times a day   • carboxymethylcellulose 0.5 % SOLN 1 drop 3 (three) times a day as needed for dry eyes   • Cholecalciferol (VITAMIN D3) 1,000 units tablet Take 1,000 Units by mouth daily   • Cyanocobalamin (B-12) 5000 MCG CAPS Take 1 capsule by mouth in the morning   • diphenoxylate-atropine (LOMOTIL) 2.5-0.025 mg per tablet Take 1 tablet by mouth 4 (four) times a day as needed for diarrhea   • ergocalciferol (ERGOCALCIFEROL) 1.25 MG (92395 UT) capsule Take 1  capsule (50,000 Units total) by mouth once a week for 12 doses   • erythromycin (ILOTYCIN) ophthalmic ointment    • ferrous sulfate 325 (65 FE) MG EC tablet Take 325 mg by mouth 3 (three) times a day with meals   • fluticasone (FLONASE) 50 mcg/act nasal spray 1 spray daily   • hydrochlorothiazide (HYDRODIURIL) 25 mg tablet Take 25 mg by mouth daily   • ketoconazole (NIZORAL) 2 % cream Apply topically 2 (two) times a day   • loperamide (IMODIUM) 2 mg capsule Take 2 capsules (4 mg total) by mouth 4 (four) times a day as needed for diarrhea   • mometasone (NASONEX) 50 mcg/act nasal spray    • Multiple Vitamins-Minerals (Ocutabs-Lutein) TABS Take 1 tablet by mouth in the morning   • Nebulizers (Compressor Nebulizer) MISC Use   • nystatin (MYCOSTATIN) cream Apply topically 2 (two) times a day   • omega-3-acid ethyl esters (LOVAZA) 1 g capsule Take 2 g by mouth 2 (two) times a day   • pantoprazole (PROTONIX) 40 mg tablet Take 1 tablet (40 mg total) by mouth daily before breakfast   • simethicone (MYLICON) 80 mg chewable tablet Chew 80 mg every 6 (six) hours as needed for flatulence   • Skin Protectants, Misc. (eucerin) cream Apply topically as needed for dry skin   • Trelegy Ellipta 100-62.5-25 MCG/ACT inhaler    • ustekinumab (Stelara) 90 mg/mL subcutaneous injection Inject 1 mL (90 mg total) under the skin every 28 days   • vitamin A 2400 MCG (8000 UT) capsule Take 8,000 Units by mouth daily   • [DISCONTINUED] atorvastatin (LIPITOR) 10 mg tablet Take 10 mg by mouth daily   • [DISCONTINUED] levothyroxine 88 mcg tablet    • [DISCONTINUED] lisinopril (ZESTRIL) 5 mg tablet Take 5 mg by mouth daily   • [DISCONTINUED] metFORMIN (GLUCOPHAGE) 500 mg tablet Take 500 mg by mouth 2 (two) times a day with meals Taking daily   • [DISCONTINUED] Banana Flakes (Banatrol plus) PACK Take 1 each by mouth 2 (two) times a day (Patient not taking: Reported on 11/30/2023)   • [DISCONTINUED] dextromethorphan-guaiFENesin (ROBITUSSIN DM)   "mg/5 mL syrup Take 10 mL by mouth every 4 (four) hours as needed for cough (Patient not taking: Reported on 2/24/2025)   • [DISCONTINUED] dorzolamide-timolol (COSOPT) 22.3-6.8 MG/ML ophthalmic solution 1 drop 2 times daily- 8 am and 8 pm- both eyes (Patient not taking: Reported on 12/3/2024)   • [DISCONTINUED] Fluticasone Furoate-Vilanterol 100-25 mcg/actuation inhaler Inhale 1 puff daily Rinse mouth after use. (Patient not taking: Reported on 2/24/2025)   • [DISCONTINUED] levothyroxine 125 mcg tablet Take 125 mcg by mouth daily (Patient not taking: Reported on 12/3/2024)   • [DISCONTINUED] pantoprazole (PROTONIX) 40 mg tablet  (Patient not taking: Reported on 4/15/2025)   • [DISCONTINUED] rifampin (RIFADIN) 300 mg capsule Take 2 capsules (600 mg total) by mouth daily Take together with Atorvastatin (Patient not taking: Reported on 2/24/2025)   • [DISCONTINUED] Zinc 50 MG CAPS Take by mouth (Patient not taking: Reported on 12/3/2024)     Allergies   Allergen Reactions   • Brimonidine      Red eyes   • Mixed Ragweed Allergic Rhinitis   • Tramadol Nausea Only     Immunization History   Administered Date(s) Administered   • COVID-19 PFIZER VACCINE 0.3 ML IM 02/15/2021, 03/08/2021, 10/26/2021   • H1N1 Inj 03/19/2010   • H1N1, All Formulations 03/19/2010   • INFLUENZA 10/16/2008, 12/17/2009, 10/05/2010, 10/27/2011, 10/25/2012, 10/01/2013, 09/24/2014, 11/03/2015, 12/27/2016, 10/18/2017, 09/28/2018, 10/11/2019, 09/28/2021, 10/10/2022, 10/10/2023   • Influenza, high dose seasonal 0.7 mL 10/06/2020   • Pneumococcal Conjugate 13-Valent 06/21/2018   • Pneumococcal Polysaccharide PPV23 04/11/2007, 06/21/2019   • Td (adult), adsorbed 06/13/2019   • Tdap 08/28/2008   • Zoster 03/19/2014   • influenza, trivalent, adjuvanted 10/09/2024     Objective   /82   Pulse 84   Temp (!) 97 °F (36.1 °C)   Ht 5' 4\" (1.626 m)   Wt 53.5 kg (118 lb)   SpO2 99%   BMI 20.25 kg/m²     Physical Exam  Vitals reviewed.   Constitutional:     "   General: She is not in acute distress.     Appearance: Normal appearance.   HENT:      Head: Normocephalic and atraumatic.      Right Ear: External ear normal.      Left Ear: External ear normal.      Nose: Nose normal.      Mouth/Throat:      Mouth: Mucous membranes are moist.   Eyes:      Extraocular Movements: Extraocular movements intact.      Conjunctiva/sclera: Conjunctivae normal.   Cardiovascular:      Rate and Rhythm: Normal rate and regular rhythm.      Pulses: no weak pulses.           Dorsalis pedis pulses are 2+ on the right side and 2+ on the left side.        Posterior tibial pulses are 2+ on the right side and 2+ on the left side.   Pulmonary:      Effort: Pulmonary effort is normal.      Breath sounds: Normal breath sounds.   Abdominal:      General: Bowel sounds are normal. There is no distension.      Palpations: Abdomen is soft.      Tenderness: There is no abdominal tenderness.   Musculoskeletal:      Cervical back: Neck supple.      Right lower leg: No edema.      Left lower leg: No edema.   Feet:      Right foot:      Skin integrity: No ulcer, skin breakdown, erythema, warmth, callus or dry skin.      Left foot:      Skin integrity: No ulcer, skin breakdown, erythema, warmth, callus or dry skin.   Skin:     General: Skin is warm.      Capillary Refill: Capillary refill takes less than 2 seconds.      Findings: No rash.   Neurological:      Mental Status: She is alert. Mental status is at baseline.             Diabetic Foot Exam    Patient's shoes and socks removed.    Right Foot/Ankle   Right Foot Inspection  Skin Exam: skin normal and skin intact. No dry skin, no warmth, no callus, no erythema, no maceration, no abnormal color, no pre-ulcer, no ulcer and no callus.     Toe Exam: ROM and strength within normal limits.     Sensory   Vibration: intact  Proprioception: intact  Monofilament testing: intact    Vascular  Capillary refills: < 3 seconds  The right DP pulse is 2+. The right PT pulse  is 2+.     Right Toe  - Comprehensive Exam  Ecchymosis: none  Arch: normal  Swelling: none   Tenderness: none       Left Foot/Ankle  Left Foot Inspection  Skin Exam: skin normal and skin intact. No dry skin, no warmth, no erythema, no maceration, normal color, no pre-ulcer, no ulcer and no callus.     Toe Exam: ROM and strength within normal limits.     Sensory   Vibration: intact  Proprioception: intact  Monofilament testing: intact    Vascular  Capillary refills: < 3 seconds  The left DP pulse is 2+. The left PT pulse is 2+.     Left Toe  - Comprehensive Exam  Ecchymosis: none  Arch: normal  Swelling: none   Tenderness: none       Assign Risk Category  No deformity present  No loss of protective sensation  No weak pulses  Risk: 0      DO Cameron [facility]  4/15/2025 4:06 PM

## 2025-04-15 NOTE — TELEPHONE ENCOUNTER
----- Message from Radha MCKEON sent at 4/15/2025 10:04 AM EDT -----  Regarding: care gap request DXA SCAN  04/15/25 10:04 AM    Hello, our patient attached above has had DEXA Scan completed/performed. Please assist in updating the patient chart by pulling the Care Everywhere (CE) document. The date of service is 05/2/2024.     Thank you,  Radha HARDEN [address]

## 2025-04-15 NOTE — TELEPHONE ENCOUNTER
Upon review of the In Basket request we were able to locate, review, and update the patient chart as requested for DEXA Scan and Medicare AWV.    Any additional questions or concerns should be emailed to the Practice Liaisons via the appropriate education email address, please do not reply via In Basket.    Thank you  Jeremie Leonardo MA   [facility]

## 2025-04-16 ENCOUNTER — RESULTS FOLLOW-UP (OUTPATIENT)
Dept: FAMILY MEDICINE CLINIC | Facility: CLINIC | Age: 75
End: 2025-04-16

## 2025-04-25 ENCOUNTER — TELEPHONE (OUTPATIENT)
Age: 75
End: 2025-04-25

## 2025-04-25 NOTE — TELEPHONE ENCOUNTER
Patient is requesting a script for Montelukast 10 mg. This had be prescribe by another provider. Patient seen in office on 04/15/25. This was prescribed for sinuses and cough.

## 2025-04-28 LAB
LEFT EYE DIABETIC RETINOPATHY: POSITIVE
RIGHT EYE DIABETIC RETINOPATHY: POSITIVE

## 2025-05-20 ENCOUNTER — OFFICE VISIT (OUTPATIENT)
Dept: FAMILY MEDICINE CLINIC | Facility: CLINIC | Age: 75
End: 2025-05-20
Payer: COMMERCIAL

## 2025-05-20 VITALS
HEART RATE: 71 BPM | TEMPERATURE: 97.6 F | HEIGHT: 64 IN | SYSTOLIC BLOOD PRESSURE: 140 MMHG | WEIGHT: 115 LBS | DIASTOLIC BLOOD PRESSURE: 78 MMHG | OXYGEN SATURATION: 98 % | BODY MASS INDEX: 19.63 KG/M2

## 2025-05-20 DIAGNOSIS — R09.82 PND (POST-NASAL DRIP): ICD-10-CM

## 2025-05-20 DIAGNOSIS — J44.9 CHRONIC OBSTRUCTIVE PULMONARY DISEASE, UNSPECIFIED COPD TYPE (HCC): ICD-10-CM

## 2025-05-20 DIAGNOSIS — J30.1 SEASONAL ALLERGIC RHINITIS DUE TO POLLEN: ICD-10-CM

## 2025-05-20 DIAGNOSIS — J06.9 UPPER RESPIRATORY TRACT INFECTION, UNSPECIFIED TYPE: ICD-10-CM

## 2025-05-20 DIAGNOSIS — E11.69 TYPE 2 DIABETES MELLITUS WITH OTHER SPECIFIED COMPLICATION, UNSPECIFIED WHETHER LONG TERM INSULIN USE (HCC): Primary | ICD-10-CM

## 2025-05-20 PROCEDURE — G2211 COMPLEX E/M VISIT ADD ON: HCPCS

## 2025-05-20 PROCEDURE — 99214 OFFICE O/P EST MOD 30 MIN: CPT

## 2025-05-20 RX ORDER — AMOXICILLIN 500 MG/1
500 CAPSULE ORAL EVERY 8 HOURS SCHEDULED
Qty: 21 CAPSULE | Refills: 0 | Status: SHIPPED | OUTPATIENT
Start: 2025-05-20 | End: 2025-05-27

## 2025-05-20 RX ORDER — FLUTICASONE PROPIONATE 50 MCG
1 SPRAY, SUSPENSION (ML) NASAL DAILY
Qty: 9.9 ML | Refills: 2 | Status: SHIPPED | OUTPATIENT
Start: 2025-05-20

## 2025-05-20 RX ORDER — FLUTICASONE FUROATE AND VILANTEROL 100; 25 UG/1; UG/1
1 POWDER RESPIRATORY (INHALATION) DAILY
Qty: 180 BLISTER | Refills: 3 | Status: SHIPPED | OUTPATIENT
Start: 2025-05-20 | End: 2026-05-15

## 2025-05-20 RX ORDER — ALBUTEROL SULFATE 90 UG/1
1 INHALANT RESPIRATORY (INHALATION) EVERY 4 HOURS PRN
Qty: 6.7 G | Refills: 1 | Status: SHIPPED | OUTPATIENT
Start: 2025-05-20

## 2025-05-20 RX ORDER — MONTELUKAST SODIUM 10 MG/1
10 TABLET ORAL
Qty: 90 TABLET | Refills: 0 | Status: SHIPPED | OUTPATIENT
Start: 2025-05-20

## 2025-05-20 NOTE — ASSESSMENT & PLAN NOTE
-Pt needs refill of albuterol and singulair  -PT would like to switch back to Breo. She notes she does not have full relief with trelegy inhaler     Orders:    albuterol (PROVENTIL HFA,VENTOLIN HFA) 90 mcg/act inhaler; Inhale 1 puff every 4 (four) hours as needed for wheezing    montelukast (SINGULAIR) 10 mg tablet; Take 1 tablet (10 mg total) by mouth daily at bedtime    Fluticasone Furoate-Vilanterol 100-25 mcg/actuation inhaler; Inhale 1 puff daily Rinse mouth after use.

## 2025-05-20 NOTE — PROGRESS NOTES
Name: Allyssa Stanton      : 1950      MRN: [id number]  Encounter Provider: Lalita Washington PA-C  Encounter Date: 2025   Encounter department: [facility] [address]  :  Assessment & Plan  Upper respiratory tract infection, unspecified type  -cough, congestion, swollen tonsils x 1 week  -She notes she has had some allergy symptoms for about 2 months with the spring season. She notes it has worsened in the past one week. She is constantly coughing at night.   -Denies fever, chills, n/v, sob, hx of smoking. Notes there is a slight wheeze after she coughs.   -She has taken cough drops, and herbal teas. It is no longer heling as symptoms have been worsening.   -On exam, lungs CTA b/l, erythematous throat with PND noted  -Given duration of symptoms, will treat with amoxicillin 500 mg q8hrs x 7 days and recommend continuing herbal teas, rest, increase hydration, and monitoring symptoms at home     Orders:    amoxicillin (AMOXIL) 500 mg capsule; Take 1 capsule (500 mg total) by mouth every 8 (eight) hours for 7 days    Chronic obstructive pulmonary disease, unspecified COPD type (HCC)  -Pt needs refill of albuterol and singulair  -PT would like to switch back to Breo. She notes she does not have full relief with trelegy inhaler     Orders:    albuterol (PROVENTIL HFA,VENTOLIN HFA) 90 mcg/act inhaler; Inhale 1 puff every 4 (four) hours as needed for wheezing    montelukast (SINGULAIR) 10 mg tablet; Take 1 tablet (10 mg total) by mouth daily at bedtime    Fluticasone Furoate-Vilanterol 100-25 mcg/actuation inhaler; Inhale 1 puff daily Rinse mouth after use.    Type 2 diabetes mellitus with other specified complication, unspecified whether long term insulin use ([facility])    Lab Results   Component Value Date    HGBA1C 5.9 04/15/2025     -Eye exam done with Dr Vicente - [facility] 2025  -Pt on metformin 500 mg daily, lisinopril 5 mg, Lipitor 10 mg        PND (post-nasal  "drip)    Orders:    fluticasone (FLONASE) 50 mcg/act nasal spray; 1 spray into each nostril in the morning.    Seasonal allergic rhinitis due to pollen  -pt needs refill of singulair 10 mg   Orders:    montelukast (SINGULAIR) 10 mg tablet; Take 1 tablet (10 mg total) by mouth daily at bedtime           History of Present Illness     SAMSON Spivey presents to the office for evaluation of cough, congestion, swollen tonsils x 2 months. She has tried herbal teas at home. She notes it has worsened in the past one week. She is constantly coughing at night.     Denies fever, chills, n/v, sob. Notes there is a slight wheeze after she coughs.     Denies smoking hx.     She has taken cough drops, and herbal teas. It is no longer heling as symptoms have been worsening.     Review of Systems   Constitutional:  Negative for activity change, appetite change, fatigue and fever.   HENT:  Positive for postnasal drip, rhinorrhea and sore throat. Negative for congestion, ear pain, sinus pressure and sinus pain.    Eyes:  Negative for pain.   Respiratory:  Positive for cough and wheezing. Negative for shortness of breath.    Cardiovascular:  Negative for chest pain and leg swelling.   Gastrointestinal:  Negative for abdominal distention, abdominal pain, constipation, diarrhea, nausea and vomiting.   Genitourinary:  Negative for dysuria, frequency and urgency.   Musculoskeletal:  Negative for gait problem.   Skin:  Negative for rash.   Neurological:  Negative for dizziness, light-headedness and headaches.       Objective   /78 (Patient Position: Sitting, Cuff Size: Standard)   Pulse 71   Temp 97.6 °F (36.4 °C)   Ht 5' 4\" (1.626 m)   Wt 52.2 kg (115 lb)   SpO2 98%   BMI 19.74 kg/m²      Physical Exam  Vitals reviewed.   Constitutional:       General: She is not in acute distress.     Appearance: Normal appearance.   HENT:      Head: Normocephalic and atraumatic.      Right Ear: Tympanic membrane, ear canal and external ear " normal.      Left Ear: Tympanic membrane, ear canal and external ear normal.      Nose: Congestion present.      Right Sinus: No maxillary sinus tenderness or frontal sinus tenderness.      Left Sinus: No maxillary sinus tenderness or frontal sinus tenderness.      Mouth/Throat:      Mouth: Mucous membranes are moist.      Pharynx: Posterior oropharyngeal erythema and postnasal drip present.     Eyes:      Extraocular Movements: Extraocular movements intact.      Conjunctiva/sclera: Conjunctivae normal.       Cardiovascular:      Rate and Rhythm: Normal rate and regular rhythm.      Heart sounds: Normal heart sounds.   Pulmonary:      Effort: Pulmonary effort is normal.      Breath sounds: Normal breath sounds. No wheezing, rhonchi or rales.   Abdominal:      General: Bowel sounds are normal. There is no distension.      Palpations: Abdomen is soft.      Tenderness: There is no abdominal tenderness. There is no right CVA tenderness or left CVA tenderness.     Musculoskeletal:      Cervical back: Neck supple.      Right lower leg: No edema.      Left lower leg: No edema.   Lymphadenopathy:      Cervical: No cervical adenopathy.     Skin:     General: Skin is warm.      Capillary Refill: Capillary refill takes less than 2 seconds.      Findings: No rash.     Neurological:      Mental Status: She is alert. Mental status is at baseline.           Lalita Washington PA-C  [facility]  5/20/2025 10:36 AM

## 2025-05-20 NOTE — ASSESSMENT & PLAN NOTE
Lab Results   Component Value Date    HGBA1C 5.9 04/15/2025     -Eye exam done with Dr Vicente - [facility] 4/2025  -Pt on metformin 500 mg daily, lisinopril 5 mg, Lipitor 10 mg

## 2025-05-21 ENCOUNTER — TELEPHONE (OUTPATIENT)
Dept: ADMINISTRATIVE | Facility: OTHER | Age: 75
End: 2025-05-21

## 2025-05-21 NOTE — LETTER
Diabetic Eye Exam Form    Date Requested: 25  Patient: Allyssa Stanton  Patient : 1950   Referring Provider: Laverne Ortega DO      DIABETIC Eye Exam Date _______________________________      Type of Exam MUST be documented for Diabetic Eye Exams. Please CHECK ONE.     Retinal Exam       Dilated Retinal Exam       OCT       Optomap-Iris Exam      Fundus Photography       Left Eye - Please check Retinopathy or No Retinopathy        Exam did show retinopathy    Exam did not show retinopathy       Right Eye - Please check Retinopathy or No Retinopathy       Exam did show retinopathy    Exam did not show retinopathy       Comments __________________________________________________________    Practice Providing Exam ______________________________________________    Exam Performed By (print name) _______________________________________      Provider Signature ___________________________________________________      These reports are needed for  compliance.    Please fax this completed form and a copy of the Diabetic Eye Exam report to the [facility] Based Department as soon as possible via Fax [phone], attention Jeremie: Phone [phone]. Our office is located at [address] [place] [address].     We thank you for your assistance in treating our mutual patient.

## 2025-05-23 ENCOUNTER — TELEPHONE (OUTPATIENT)
Age: 75
End: 2025-05-23

## 2025-05-23 NOTE — TELEPHONE ENCOUNTER
Pt called asking what medication did DONOVAN Celis want her to take together with her Amoxicillin.  Advised pt that based on provider documentation there was nothing that provider wanted to take in conjunction with Amoxicillin prescription but to continue with Flonase, Breo and Singulair.  Pt verbalized understanding.  No further questions or concerns at this time.

## 2025-05-28 ENCOUNTER — APPOINTMENT (OUTPATIENT)
Dept: LAB | Facility: HOSPITAL | Age: 75
End: 2025-05-28
Payer: COMMERCIAL

## 2025-05-28 DIAGNOSIS — D47.2 MGUS (MONOCLONAL GAMMOPATHY OF UNKNOWN SIGNIFICANCE): ICD-10-CM

## 2025-05-28 LAB
ALBUMIN SERPL BCG-MCNC: 4.1 G/DL (ref 3.5–5)
ALP SERPL-CCNC: 99 U/L (ref 34–104)
ALT SERPL W P-5'-P-CCNC: 26 U/L (ref 7–52)
ANION GAP SERPL CALCULATED.3IONS-SCNC: 6 MMOL/L (ref 4–13)
AST SERPL W P-5'-P-CCNC: 24 U/L (ref 13–39)
BASOPHILS # BLD AUTO: 0.04 THOUSANDS/ÂΜL (ref 0–0.1)
BASOPHILS NFR BLD AUTO: 1 % (ref 0–1)
BILIRUB SERPL-MCNC: 0.8 MG/DL (ref 0.2–1)
BUN SERPL-MCNC: 20 MG/DL (ref 5–25)
CALCIUM SERPL-MCNC: 9.4 MG/DL (ref 8.4–10.2)
CHLORIDE SERPL-SCNC: 102 MMOL/L (ref 96–108)
CO2 SERPL-SCNC: 31 MMOL/L (ref 21–32)
CREAT SERPL-MCNC: 0.87 MG/DL (ref 0.6–1.3)
EOSINOPHIL # BLD AUTO: 0.04 THOUSAND/ÂΜL (ref 0–0.61)
EOSINOPHIL NFR BLD AUTO: 1 % (ref 0–6)
ERYTHROCYTE [DISTWIDTH] IN BLOOD BY AUTOMATED COUNT: 13.7 % (ref 11.6–15.1)
GFR SERPL CREATININE-BSD FRML MDRD: 66 ML/MIN/1.73SQ M
GLUCOSE P FAST SERPL-MCNC: 85 MG/DL (ref 65–99)
HCT VFR BLD AUTO: 41.9 % (ref 34.8–46.1)
HGB BLD-MCNC: 12.9 G/DL (ref 11.5–15.4)
IGA SERPL-MCNC: 150 MG/DL (ref 66–433)
IGG SERPL-MCNC: 1709 MG/DL (ref 635–1741)
IGM SERPL-MCNC: 151 MG/DL (ref 45–281)
IMM GRANULOCYTES # BLD AUTO: 0.01 THOUSAND/UL (ref 0–0.2)
IMM GRANULOCYTES NFR BLD AUTO: 0 % (ref 0–2)
LYMPHOCYTES # BLD AUTO: 1.76 THOUSANDS/ÂΜL (ref 0.6–4.47)
LYMPHOCYTES NFR BLD AUTO: 36 % (ref 14–44)
MCH RBC QN AUTO: 27.7 PG (ref 26.8–34.3)
MCHC RBC AUTO-ENTMCNC: 30.8 G/DL (ref 31.4–37.4)
MCV RBC AUTO: 90 FL (ref 82–98)
MONOCYTES # BLD AUTO: 0.38 THOUSAND/ÂΜL (ref 0.17–1.22)
MONOCYTES NFR BLD AUTO: 8 % (ref 4–12)
NEUTROPHILS # BLD AUTO: 2.68 THOUSANDS/ÂΜL (ref 1.85–7.62)
NEUTS SEG NFR BLD AUTO: 54 % (ref 43–75)
NRBC BLD AUTO-RTO: 0 /100 WBCS
PLATELET # BLD AUTO: 272 THOUSANDS/UL (ref 149–390)
PMV BLD AUTO: 12.3 FL (ref 8.9–12.7)
POTASSIUM SERPL-SCNC: 4.1 MMOL/L (ref 3.5–5.3)
PROT SERPL-MCNC: 7.9 G/DL (ref 6.4–8.4)
RBC # BLD AUTO: 4.65 MILLION/UL (ref 3.81–5.12)
SODIUM SERPL-SCNC: 139 MMOL/L (ref 135–147)
WBC # BLD AUTO: 4.91 THOUSAND/UL (ref 4.31–10.16)

## 2025-05-28 PROCEDURE — 86334 IMMUNOFIX E-PHORESIS SERUM: CPT

## 2025-05-28 PROCEDURE — 82784 ASSAY IGA/IGD/IGG/IGM EACH: CPT

## 2025-05-28 PROCEDURE — 80053 COMPREHEN METABOLIC PANEL: CPT

## 2025-05-28 PROCEDURE — 83521 IG LIGHT CHAINS FREE EACH: CPT

## 2025-05-28 PROCEDURE — 36415 COLL VENOUS BLD VENIPUNCTURE: CPT

## 2025-05-28 PROCEDURE — 84165 PROTEIN E-PHORESIS SERUM: CPT

## 2025-05-28 PROCEDURE — 85025 COMPLETE CBC W/AUTO DIFF WBC: CPT

## 2025-05-29 LAB
KAPPA LC FREE SER-MCNC: 26.4 MG/L (ref 3.3–19.4)
KAPPA LC FREE/LAMBDA FREE SER: 0.77 {RATIO} (ref 0.26–1.65)
LAMBDA LC FREE SERPL-MCNC: 34.1 MG/L (ref 5.7–26.3)

## 2025-05-30 NOTE — TELEPHONE ENCOUNTER
----- Message from Radha MCKEON sent at 5/20/2025 12:54 PM EDT -----  Regarding: care gap request dm eye exam  05/20/25 12:54 PMHello, our patient attached above has had Diabetic Eye Exam completed/performed. Please assist in updating the patient chart by making an External outreach to [facility] facility located in [place]. The date of service is 04/2025.Thank you,Radha HARDEN [address] [place] [address]  
As a follow-up, a second attempt has been made for outreach via telephone call to facility. Please see Contacts section for details.    Thank you  Jeremie Leonardo MA   
Upon review of the In Basket request and the patient's chart, initial outreach has been made via fax to facility. Please see Contacts section for details.     Thank you  Jeremie Leonardo MA   
Upon review of the In Basket request we were able to locate, review, and update the patient chart as requested for Diabetic Eye Exam.    Any additional questions or concerns should be emailed to the Practice Liaisons via the appropriate education email address, please do not reply via In Basket.    Thank you  Jeremie Leonardo MA   [facility]          
To get better and follow your care plan as instructed.

## 2025-06-02 LAB
ALBUMIN SERPL ELPH-MCNC: 3.97 G/DL (ref 3.2–5.1)
ALBUMIN SERPL ELPH-MCNC: 50.3 % (ref 48–70)
ALPHA1 GLOB SERPL ELPH-MCNC: 0.31 G/DL (ref 0.15–0.47)
ALPHA1 GLOB SERPL ELPH-MCNC: 3.9 % (ref 1.8–7)
ALPHA2 GLOB SERPL ELPH-MCNC: 0.81 G/DL (ref 0.42–1.04)
ALPHA2 GLOB SERPL ELPH-MCNC: 10.3 % (ref 5.9–14.9)
BETA GLOB ABNORMAL SERPL ELPH-MCNC: 0.54 G/DL (ref 0.31–0.57)
BETA1 GLOB SERPL ELPH-MCNC: 6.8 % (ref 4.7–7.7)
BETA2 GLOB SERPL ELPH-MCNC: 4.1 % (ref 3.1–7.9)
BETA2+GAMMA GLOB SERPL ELPH-MCNC: 0.32 G/DL (ref 0.2–0.58)
GAMMA GLOB ABNORMAL SERPL ELPH-MCNC: 1.94 G/DL (ref 0.4–1.66)
GAMMA GLOB SERPL ELPH-MCNC: 24.6 % (ref 6.9–22.3)
IGG/ALB SER: 1.01 {RATIO} (ref 1.1–1.8)
M PROTEIN 1 SERPL ELPH-MCNC: 0.97 G/DL
PROT SERPL-MCNC: 7.9 G/DL (ref 6.4–8.4)

## 2025-06-02 PROCEDURE — 86334 IMMUNOFIX E-PHORESIS SERUM: CPT | Performed by: STUDENT IN AN ORGANIZED HEALTH CARE EDUCATION/TRAINING PROGRAM

## 2025-06-02 PROCEDURE — 84165 PROTEIN E-PHORESIS SERUM: CPT | Performed by: STUDENT IN AN ORGANIZED HEALTH CARE EDUCATION/TRAINING PROGRAM

## 2025-06-04 ENCOUNTER — OFFICE VISIT (OUTPATIENT)
Dept: HEMATOLOGY ONCOLOGY | Facility: CLINIC | Age: 75
End: 2025-06-04
Payer: COMMERCIAL

## 2025-06-04 VITALS
WEIGHT: 116 LBS | HEIGHT: 64 IN | BODY MASS INDEX: 19.81 KG/M2 | RESPIRATION RATE: 18 BRPM | HEART RATE: 92 BPM | DIASTOLIC BLOOD PRESSURE: 68 MMHG | SYSTOLIC BLOOD PRESSURE: 116 MMHG | OXYGEN SATURATION: 97 % | TEMPERATURE: 98.8 F

## 2025-06-04 DIAGNOSIS — D47.2 SMOLDERING MULTIPLE MYELOMA (SMM): Primary | ICD-10-CM

## 2025-06-04 DIAGNOSIS — R30.0 DYSURIA: ICD-10-CM

## 2025-06-04 PROCEDURE — 99215 OFFICE O/P EST HI 40 MIN: CPT | Performed by: INTERNAL MEDICINE

## 2025-06-04 NOTE — PROGRESS NOTES
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  Most recent serum protein electrophoresis shows IgG lambda M protein at 0.97 g/dL light chain ratio was normal with both mildly elevated kappa and lambda light chains.  Immunoglobulin levels are within normal limits.  CBC shows no cytopenias.  CMP shows no renal insufficiency or hypercalcemia.  Patient has some vague pains.  Will send for CT myeloma scan.  And a urine electrophoresis.  If urine after/shows any M protein we will then do 24-hour urine studies.  Otherwise seems to be doing well and we will see her back in 6 months if the studies are unremarkable.  Orders:    Protein electrophoresis, urine; Future    CBC and differential; Future    Comprehensive metabolic panel; Future    Protein electrophoresis, serum; Future    Immunoglobulin free LT chains blood; Future    IgG, IgA, IgM; Future    CT low dose whole body myeloma scan; Future    Dysuria  With some mild pelvic pain.  Her pelvic pain may be related to her GI issues and I discussed with her about getting some scan or discussing it with her GI which would be the preferable route.  Patient would call GI if pain gets worse or is persistent.  Will do a urine analysis with reflex to culture if any UTI detected and treat accordingly.  Orders:    Urinalysis with microscopic; Future      Assessment & Plan        Return in about 6 months (around 2025).    History of Present Illness   Chief Complaint   Patient presents with    Follow-up     History of Present Illness  Patient is a transfer of care.  She is following up her because of her history of smoldering multiple myeloma.  Her last bone marrow biopsy was in 2021 which  "showed 15% lambda restricted plasma cells.  She also has history of Crohn's disease of the small intestine with multiple complications.  She does have a history of iron deficiency.  She takes oral iron supplementation and also B12 supplementation.  She does have a history of colectomy with a J-pouch and often has problems related to that.  She recently had a EGD and pouchoscopy in 5/20/2025.  Which showed pouchitis  She reports some pain around the right pelvis that has been going on since early May.  Also reports some dysuria.  Does have some constipation.  Which is a chronic issue.  Pertinent Medical History     06/04/25: As above     Review of Systems   Constitutional:  Negative for chills and fever.   HENT:  Negative for ear pain and sore throat.    Eyes:  Negative for pain and visual disturbance.   Respiratory:  Negative for cough and shortness of breath.    Cardiovascular:  Negative for chest pain and palpitations.   Gastrointestinal:  Positive for abdominal pain. Negative for vomiting.   Genitourinary:  Positive for dysuria. Negative for hematuria.   Musculoskeletal:  Negative for arthralgias and back pain.   Skin:  Negative for color change and rash.   Neurological:  Negative for seizures and syncope.   All other systems reviewed and are negative.    Medical History Reviewed by provider this encounter:     .      Objective   /68 (BP Location: Left arm, Patient Position: Sitting, Cuff Size: Adult)   Pulse 92   Temp 98.8 °F (37.1 °C) (Temporal)   Resp 18   Ht 5' 4\" (1.626 m)   Wt 52.6 kg (116 lb)   SpO2 97%   BMI 19.91 kg/m²     Physical Exam  Constitutional:       Appearance: Normal appearance. She is normal weight.   HENT:      Head: Normocephalic.      Nose: Nose normal.      Mouth/Throat:      Mouth: Mucous membranes are moist.     Eyes:      Extraocular Movements: Extraocular movements intact.      Pupils: Pupils are equal, round, and reactive to light.       Cardiovascular:      Rate and " Rhythm: Normal rate.      Pulses: Normal pulses.   Pulmonary:      Effort: Pulmonary effort is normal.      Breath sounds: Normal breath sounds.   Abdominal:      General: Bowel sounds are normal.      Palpations: Abdomen is soft.      Tenderness: There is no abdominal tenderness.     Musculoskeletal:         General: Normal range of motion.      Cervical back: Normal range of motion.   Lymphadenopathy:      Cervical: No cervical adenopathy.     Skin:     General: Skin is warm.     Neurological:      General: No focal deficit present.      Mental Status: She is alert and oriented to person, place, and time. Mental status is at baseline.       Physical Exam      Results    Labs: I have reviewed the following labs:  Results for orders placed or performed in visit on 05/28/25   IgG, IgA, IgM   Result Value Ref Range     66 - 433 mg/dL    IGG 1,709 635 - 1,741 mg/dL     45 - 281 mg/dL   Immunoglobulin free LT chains blood   Result Value Ref Range    Ig Kappa Free Light Chain 26.4 (H) 3.3 - 19.4 mg/L    Ig Lambda Free Light Chain 34.1 (H) 5.7 - 26.3 mg/L    Kappa/Lambda FluidC Ratio 0.77 0.26 - 1.65   Protein electrophoresis, serum   Result Value Ref Range    A/G Ratio 1.01 (L) 1.10 - 1.80    Albumin % 50.3 48.0 - 70.0 %    Albumin 3.97 3.20 - 5.10 g/dl    Alpha-1 Globulin % 3.9 1.8 - 7.0 %    Alpha-1 Globulin 0.31 0.15 - 0.47 g/dL    Alpha-2 Globulin % 10.3 5.9 - 14.9 %    Alpha-2 Globulin 0.81 0.42 - 1.04 g/dL    Beta-1 Globulin % 6.8 4.7 - 7.7 %    Beta-1 Globulin 0.54 0.31 - 0.57 g/dL    Beta-2 Globulin % 4.1 3.1 - 7.9 %    Beta-2 Globulin 0.32 0.20 - 0.58 g/dL    Gamma Globulin % 24.6 (H) 6.9 - 22.3 %    Gamma Globulin 1.94 (H) 0.40 - 1.66 g/dL    M-Rd 0.97 g/dL    Total Protein 7.9 6.4 - 8.4 g/dL   Comprehensive metabolic panel   Result Value Ref Range    Sodium 139 135 - 147 mmol/L    Potassium 4.1 3.5 - 5.3 mmol/L    Chloride 102 96 - 108 mmol/L    CO2 31 21 - 32 mmol/L    ANION GAP 6 4 - 13  mmol/L    BUN 20 5 - 25 mg/dL    Creatinine 0.87 0.60 - 1.30 mg/dL    Glucose, Fasting 85 65 - 99 mg/dL    Calcium 9.4 8.4 - 10.2 mg/dL    AST 24 13 - 39 U/L    ALT 26 7 - 52 U/L    Alkaline Phosphatase 99 34 - 104 U/L    Total Protein 7.9 6.4 - 8.4 g/dL    Albumin 4.1 3.5 - 5.0 g/dL    Total Bilirubin 0.80 0.20 - 1.00 mg/dL    eGFR 66 ml/min/1.73sq m   CBC and differential   Result Value Ref Range    WBC 4.91 4.31 - 10.16 Thousand/uL    RBC 4.65 3.81 - 5.12 Million/uL    Hemoglobin 12.9 11.5 - 15.4 g/dL    Hematocrit 41.9 34.8 - 46.1 %    MCV 90 82 - 98 fL    MCH 27.7 26.8 - 34.3 pg    MCHC 30.8 (L) 31.4 - 37.4 g/dL    RDW 13.7 11.6 - 15.1 %    MPV 12.3 8.9 - 12.7 fL    Platelets 272 149 - 390 Thousands/uL    nRBC 0 /100 WBCs    Segmented % 54 43 - 75 %    Immature Grans % 0 0 - 2 %    Lymphocytes % 36 14 - 44 %    Monocytes % 8 4 - 12 %    Eosinophils Relative 1 0 - 6 %    Basophils Relative 1 0 - 1 %    Absolute Neutrophils 2.68 1.85 - 7.62 Thousands/µL    Absolute Immature Grans 0.01 0.00 - 0.20 Thousand/uL    Absolute Lymphocytes 1.76 0.60 - 4.47 Thousands/µL    Absolute Monocytes 0.38 0.17 - 1.22 Thousand/µL    Eosinophils Absolute 0.04 0.00 - 0.61 Thousand/µL    Basophils Absolute 0.04 0.00 - 0.10 Thousands/µL   Path Interpretation, Serum Protein Electrophoresis   Result Value Ref Range    Case Report       Electrophoresis Case                              Case: [id number]                                   Authorizing Provider:  García Randall MD    Collected:           05/28/2025 0904              Ordering Location:     [facility]      Received:            05/28/2025 4112                                     Laboratory Services [facility]                                                                       Pathologist:           Asher Baires,                                                                             MD                                                                           Specimen:    Hand, Left                                                                                 SPEP Interpretation       The SPEP shows a persistent, monoclonal peak in the gamma region.   Previous immunofixation on 05/21/2024 identified the monoclonal peak as IgG-lambda.   Repeat immunofixation to be performed.       SPEP Immunofixation Interpretation       Serum immunofixation shows a monoclonal gammopathy identified as IgG-lambda; the history of bone marrow involvement ([id number]) by a plasma cell neoplasm (>10% involvement) is diagnostic of at least smoldering myeloma. Correlation with other clinical and radiologic findings to rule out overt multiple myeloma, is recommended.     Correlation with UPEP and urine free light chains (kappa, lambda) is recommended, to evaluate for monoclonal immunoglobulin excreted in the urine and for Bence-Dash proteinuria, if not recently ordered.       IRWIN Baires MD  Interpretation performed at [facility], [address] [place] [address].       Clinical Information 74 year old female with smoldering myeloma.       Lab Results   Component Value Date/Time    WBC 4.91 05/28/2025 09:04 AM    RBC 4.65 05/28/2025 09:04 AM    Hemoglobin 12.9 05/28/2025 09:04 AM    Hematocrit 41.9 05/28/2025 09:04 AM    MCV 90 05/28/2025 09:04 AM    MCH 27.7 05/28/2025 09:04 AM    RDW 13.7 05/28/2025 09:04 AM    Platelets 272 05/28/2025 09:04 AM    Segmented % 54 05/28/2025 09:04 AM    Lymphocytes % 36 05/28/2025 09:04 AM    Monocytes % 8 05/28/2025 09:04 AM    Eosinophils Relative 1 05/28/2025 09:04 AM    Basophils Relative 1 05/28/2025 09:04 AM    Immature Grans % 0 05/28/2025 09:04 AM    Absolute Neutrophils 2.68 05/28/2025 09:04 AM      Lab Results   Component Value Date/Time    Sodium 139 05/28/2025 09:04 AM    Potassium 4.1 05/28/2025 09:04 AM    Chloride 102 05/28/2025 09:04 AM    CO2 31  "05/28/2025 09:04 AM    ANION GAP 6 05/28/2025 09:04 AM    BUN 20 05/28/2025 09:04 AM    Creatinine 0.87 05/28/2025 09:04 AM    Glucose 102 03/06/2025 09:16 AM    Glucose, Fasting 85 05/28/2025 09:04 AM    Calcium 9.4 05/28/2025 09:04 AM    AST 24 05/28/2025 09:04 AM    ALT 26 05/28/2025 09:04 AM    Alkaline Phosphatase 99 05/28/2025 09:04 AM    Total Protein 7.9 05/28/2025 09:04 AM    Total Protein 7.9 05/28/2025 09:04 AM    Albumin 4.1 05/28/2025 09:04 AM    Total Bilirubin 0.80 05/28/2025 09:04 AM    eGFR 66 05/28/2025 09:04 AM      Lab Results   Component Value Date/Time    SPEP Interpretation See Comment 11/18/2024 10:22 AM    Beta-2 Globulin % 4.1 05/28/2025 09:04 AM    Ig [place] Free Light Chain 26.4 (H) 05/28/2025 09:04 AM    Ig Lambda Free Light Chain 34.1 (H) 05/28/2025 09:04 AM    Gamma Globulin % 24.6 (H) 05/28/2025 09:04 AM    IGG 1,709 05/28/2025 09:04 AM     05/28/2025 09:04 AM     05/28/2025 09:04 AM      No results found for: \"HAPTOGLOBIN\", \"LDH\", \"HEMOLYSIS\", \"RETIC\", \"DIRECTCOOMBS\", \"BILIDIR\", \"LYMPHOMAN\", \"FLOWCYTO\"   Lab Results   Component Value Date/Time    Iron 83 03/06/2025 09:16 AM    Iron Saturation 17 03/06/2025 09:16 AM    Ferritin 26 03/06/2025 09:16 AM    Vitamin B-12 922 (H) 03/06/2025 09:16 AM    CRP 3.7 (H) 03/06/2025 09:16 AM        Radiology Results Review: I have reviewed radiology reports from relevant scans including: CT myeloma scan and MRI abdomen/MRCP.    Administrative Statements   I have spent a total time of 40 minutes in caring for this patient on the day of the visit/encounter including Diagnostic results, Prognosis, Risks and benefits of tx options, Instructions for management, Patient and family education, Importance of tx compliance, Risk factor reductions, Impressions, Counseling / Coordination of care, Documenting in the medical record, Reviewing/placing orders in the medical record (including tests, medications, and/or procedures), and Obtaining or " reviewing history  .

## 2025-06-08 ENCOUNTER — NURSE TRIAGE (OUTPATIENT)
Dept: OTHER | Facility: OTHER | Age: 75
End: 2025-06-08

## 2025-06-08 NOTE — TELEPHONE ENCOUNTER
"REASON FOR CONVERSATION: Cough    SYMPTOMS: cough with white foamy mucous, severe pain in the left side while coughing and with palpation, feeling a little dizzy    OTHER HEALTH INFORMATION: None    PROTOCOL DISPOSITION: Go to ED Now (or PCP Triage)    CARE ADVICE PROVIDED: Per on-call provider, as long as patient is not short of breath, she can follow up in the office tomorrow. Patient unavailable for appointment tomorrow; requested Tuesday appointment. Scheduled at 8:30am; advised patient to go to the Emergency Department if she develops any chest pain or shortness of breath in the meantime.    PRACTICE FOLLOW-UP: Patient scheduled for 8:30am on Tuesday    Reason for Disposition   Patient sounds very sick or weak to the triager    Answer Assessment - Initial Assessment Questions  1. ONSET: \"When did the cough begin?\"         Friday    2. SEVERITY: \"How bad is the cough today?\"     Coughing a lot, feeling dizzy, pain in the left side with coughing and also with   palpation    3. SPUTUM: \"Describe the color of your sputum\" (e.g., none, dry cough; clear, white, yellow, green)        Thick white, foamy mucous    4. HEMOPTYSIS: \"Are you coughing up any blood?\" If Yes, ask: \"How much?\" (e.g., flecks, streaks, tablespoons, etc.)        Denies    5. DIFFICULTY BREATHING: \"Are you having difficulty breathing?\" If Yes, ask: \"How bad is it?\" (e.g., mild, moderate, severe)         Breathing fine    6. FEVER: \"Do you have a fever?\" If Yes, ask: \"What is your temperature, how was it measured, and when did it start?\"        Denies    7. CARDIAC HISTORY: \"Do you have any history of heart disease?\" (e.g., heart attack, congestive heart failure)     Denies        8. LUNG HISTORY: \"Do you have any history of lung disease?\"  (e.g., pulmonary embolus, asthma, emphysema)        Denies    9. PE RISK FACTORS: \"Do you have a history of blood clots?\" (or: recent major surgery, recent prolonged travel, bedridden)    Denies    10. OTHER " "SYMPTOMS: \"Do you have any other symptoms?\" (e.g., runny nose, wheezing, chest pain)          Patient has cancer; and saw Oncology; severe left rib pain; pain in bladder with urination    11. TRAVEL: \"Have you traveled out of the country in the last month?\" (e.g., travel history, exposures)          Denies    Protocols used: Cough - Acute Productive-Adult-AH    "

## 2025-06-24 ENCOUNTER — APPOINTMENT (OUTPATIENT)
Dept: LAB | Facility: HOSPITAL | Age: 75
End: 2025-06-24
Attending: INTERNAL MEDICINE
Payer: COMMERCIAL

## 2025-06-24 ENCOUNTER — HOSPITAL ENCOUNTER (OUTPATIENT)
Dept: CT IMAGING | Facility: HOSPITAL | Age: 75
Discharge: HOME | End: 2025-06-24
Attending: INTERNAL MEDICINE
Payer: COMMERCIAL

## 2025-06-24 DIAGNOSIS — R30.0 DYSURIA: ICD-10-CM

## 2025-06-24 DIAGNOSIS — D47.2 SMOLDERING MULTIPLE MYELOMA (SMM): ICD-10-CM

## 2025-06-24 LAB
BACTERIA UR QL AUTO: ABNORMAL /HPF
BILIRUB UR QL STRIP: NEGATIVE
CLARITY UR: CLEAR
COLOR UR: ABNORMAL
GLUCOSE UR STRIP-MCNC: NEGATIVE MG/DL
HGB UR QL STRIP.AUTO: NEGATIVE
KETONES UR STRIP-MCNC: NEGATIVE MG/DL
LEUKOCYTE ESTERASE UR QL STRIP: ABNORMAL
NITRITE UR QL STRIP: NEGATIVE
NON-SQ EPI CELLS URNS QL MICRO: ABNORMAL /HPF
PH UR STRIP.AUTO: 6 [PH]
PROT UR STRIP-MCNC: NEGATIVE MG/DL
RBC #/AREA URNS AUTO: ABNORMAL /HPF
SP GR UR STRIP.AUTO: 1.03 (ref 1–1.03)
UROBILINOGEN UR STRIP-ACNC: 2 MG/DL
WBC #/AREA URNS AUTO: ABNORMAL /HPF

## 2025-06-24 PROCEDURE — 76497 UNLISTED CT PROCEDURE: CPT

## 2025-06-24 PROCEDURE — 81001 URINALYSIS AUTO W/SCOPE: CPT

## 2025-07-20 DIAGNOSIS — R09.82 PND (POST-NASAL DRIP): ICD-10-CM

## 2025-07-21 RX ORDER — FLUTICASONE PROPIONATE 50 MCG
1 SPRAY, SUSPENSION (ML) NASAL DAILY
Qty: 48 ML | Refills: 1 | Status: SHIPPED | OUTPATIENT
Start: 2025-07-21

## 2025-07-24 DIAGNOSIS — K29.01 ACUTE SUPERFICIAL GASTRITIS WITH HEMORRHAGE: ICD-10-CM

## 2025-07-24 DIAGNOSIS — R10.84 GENERALIZED ABDOMINAL PAIN: ICD-10-CM

## 2025-07-24 RX ORDER — BACLOFEN 10 MG/1
10 TABLET ORAL 2 TIMES DAILY
Qty: 60 TABLET | Refills: 3 | Status: SHIPPED | OUTPATIENT
Start: 2025-07-24

## 2025-08-18 DIAGNOSIS — J44.9 CHRONIC OBSTRUCTIVE PULMONARY DISEASE, UNSPECIFIED COPD TYPE (HCC): ICD-10-CM

## 2025-08-18 DIAGNOSIS — J30.1 SEASONAL ALLERGIC RHINITIS DUE TO POLLEN: ICD-10-CM

## 2025-08-19 RX ORDER — MONTELUKAST SODIUM 10 MG/1
10 TABLET ORAL
Qty: 90 TABLET | Refills: 1 | Status: SHIPPED | OUTPATIENT
Start: 2025-08-19

## 2025-08-21 ENCOUNTER — OFFICE VISIT (OUTPATIENT)
Dept: GASTROENTEROLOGY | Facility: CLINIC | Age: 75
End: 2025-08-21

## 2025-08-21 VITALS
DIASTOLIC BLOOD PRESSURE: 78 MMHG | BODY MASS INDEX: 19.81 KG/M2 | HEIGHT: 64 IN | SYSTOLIC BLOOD PRESSURE: 132 MMHG | HEART RATE: 71 BPM | OXYGEN SATURATION: 98 % | TEMPERATURE: 97.6 F | WEIGHT: 116 LBS

## 2025-08-21 DIAGNOSIS — K50.919 CROHN'S DISEASE WITH COMPLICATION, UNSPECIFIED GASTROINTESTINAL TRACT LOCATION (HCC): Primary | ICD-10-CM

## 2025-08-21 RX ORDER — BIMATOPROST 0.1 MG/ML
SOLUTION/ DROPS OPHTHALMIC
COMMUNITY
Start: 2025-07-11